# Patient Record
Sex: MALE | Race: WHITE | NOT HISPANIC OR LATINO | Employment: OTHER | URBAN - METROPOLITAN AREA
[De-identification: names, ages, dates, MRNs, and addresses within clinical notes are randomized per-mention and may not be internally consistent; named-entity substitution may affect disease eponyms.]

---

## 2023-12-23 ENCOUNTER — APPOINTMENT (OUTPATIENT)
Dept: GENERAL RADIOLOGY | Facility: HOSPITAL | Age: 83
End: 2023-12-23
Payer: MEDICARE

## 2023-12-23 ENCOUNTER — HOSPITAL ENCOUNTER (EMERGENCY)
Facility: HOSPITAL | Age: 83
Discharge: HOME | End: 2023-12-23
Attending: EMERGENCY MEDICINE
Payer: MEDICARE

## 2023-12-23 ENCOUNTER — APPOINTMENT (OUTPATIENT)
Dept: CT IMAGING | Facility: HOSPITAL | Age: 83
End: 2023-12-23
Payer: MEDICARE

## 2023-12-23 VITALS
TEMPERATURE: 97.6 F | BODY MASS INDEX: 28.78 KG/M2 | SYSTOLIC BLOOD PRESSURE: 157 MMHG | WEIGHT: 201.06 LBS | HEIGHT: 70 IN | DIASTOLIC BLOOD PRESSURE: 94 MMHG | RESPIRATION RATE: 15 BRPM | OXYGEN SATURATION: 96 % | HEART RATE: 105 BPM

## 2023-12-23 DIAGNOSIS — I49.8 VENTRICULAR BIGEMINY: ICD-10-CM

## 2023-12-23 DIAGNOSIS — S50.01XA CONTUSION OF RIGHT ELBOW, INITIAL ENCOUNTER: ICD-10-CM

## 2023-12-23 DIAGNOSIS — R55 SYNCOPE, UNSPECIFIED SYNCOPE TYPE: Primary | ICD-10-CM

## 2023-12-23 LAB
ALBUMIN SERPL-MCNC: 4.4 G/DL (ref 3.5–5.2)
ALBUMIN/GLOB SERPL: 1.7 G/DL
ALP SERPL-CCNC: 60 U/L (ref 39–117)
ALT SERPL W P-5'-P-CCNC: 8 U/L (ref 1–41)
ANION GAP SERPL CALCULATED.3IONS-SCNC: 12.3 MMOL/L (ref 5–15)
APAP SERPL-MCNC: <5 MCG/ML (ref 0–30)
AST SERPL-CCNC: 18 U/L (ref 1–40)
BASOPHILS # BLD AUTO: 0.06 10*3/MM3 (ref 0–0.2)
BASOPHILS NFR BLD AUTO: 0.8 % (ref 0–1.5)
BILIRUB SERPL-MCNC: 0.3 MG/DL (ref 0–1.2)
BUN SERPL-MCNC: 11 MG/DL (ref 8–23)
BUN/CREAT SERPL: 11.8 (ref 7–25)
CALCIUM SPEC-SCNC: 9.2 MG/DL (ref 8.6–10.5)
CHLORIDE SERPL-SCNC: 101 MMOL/L (ref 98–107)
CO2 SERPL-SCNC: 22.7 MMOL/L (ref 22–29)
CREAT SERPL-MCNC: 0.93 MG/DL (ref 0.76–1.27)
DEPRECATED RDW RBC AUTO: 55.2 FL (ref 37–54)
EGFRCR SERPLBLD CKD-EPI 2021: 81 ML/MIN/1.73
EOSINOPHIL # BLD AUTO: 0.17 10*3/MM3 (ref 0–0.4)
EOSINOPHIL NFR BLD AUTO: 2.3 % (ref 0.3–6.2)
ERYTHROCYTE [DISTWIDTH] IN BLOOD BY AUTOMATED COUNT: 16.4 % (ref 12.3–15.4)
ETHANOL BLD-MCNC: <10 MG/DL (ref 0–10)
ETHANOL UR QL: <0.01 %
GLOBULIN UR ELPH-MCNC: 2.6 GM/DL
GLUCOSE SERPL-MCNC: 95 MG/DL (ref 65–99)
HCT VFR BLD AUTO: 39.1 % (ref 37.5–51)
HGB BLD-MCNC: 12.9 G/DL (ref 13–17.7)
HOLD SPECIMEN: NORMAL
HOLD SPECIMEN: NORMAL
IMM GRANULOCYTES # BLD AUTO: 0.03 10*3/MM3 (ref 0–0.05)
IMM GRANULOCYTES NFR BLD AUTO: 0.4 % (ref 0–0.5)
LYMPHOCYTES # BLD AUTO: 2.43 10*3/MM3 (ref 0.7–3.1)
LYMPHOCYTES NFR BLD AUTO: 32.7 % (ref 19.6–45.3)
MAGNESIUM SERPL-MCNC: 2 MG/DL (ref 1.6–2.4)
MCH RBC QN AUTO: 30.4 PG (ref 26.6–33)
MCHC RBC AUTO-ENTMCNC: 33 G/DL (ref 31.5–35.7)
MCV RBC AUTO: 92 FL (ref 79–97)
MONOCYTES # BLD AUTO: 0.98 10*3/MM3 (ref 0.1–0.9)
MONOCYTES NFR BLD AUTO: 13.2 % (ref 5–12)
NEUTROPHILS NFR BLD AUTO: 3.76 10*3/MM3 (ref 1.7–7)
NEUTROPHILS NFR BLD AUTO: 50.6 % (ref 42.7–76)
NRBC BLD AUTO-RTO: 0 /100 WBC (ref 0–0.2)
PLATELET # BLD AUTO: 308 10*3/MM3 (ref 140–450)
PMV BLD AUTO: 10.2 FL (ref 6–12)
POTASSIUM SERPL-SCNC: 4.4 MMOL/L (ref 3.5–5.2)
PROT SERPL-MCNC: 7 G/DL (ref 6–8.5)
QT INTERVAL: 402 MS
QTC INTERVAL: 510 MS
RBC # BLD AUTO: 4.25 10*6/MM3 (ref 4.14–5.8)
SALICYLATES SERPL-MCNC: 0.4 MG/DL
SODIUM SERPL-SCNC: 136 MMOL/L (ref 136–145)
TROPONIN T SERPL HS-MCNC: 22 NG/L
TROPONIN T SERPL HS-MCNC: 23 NG/L
WBC NRBC COR # BLD AUTO: 7.43 10*3/MM3 (ref 3.4–10.8)
WHOLE BLOOD HOLD COAG: NORMAL
WHOLE BLOOD HOLD SPECIMEN: NORMAL

## 2023-12-23 PROCEDURE — 93005 ELECTROCARDIOGRAM TRACING: CPT | Performed by: EMERGENCY MEDICINE

## 2023-12-23 PROCEDURE — 84484 ASSAY OF TROPONIN QUANT: CPT | Performed by: EMERGENCY MEDICINE

## 2023-12-23 PROCEDURE — 36415 COLL VENOUS BLD VENIPUNCTURE: CPT

## 2023-12-23 PROCEDURE — 93005 ELECTROCARDIOGRAM TRACING: CPT

## 2023-12-23 PROCEDURE — 72125 CT NECK SPINE W/O DYE: CPT

## 2023-12-23 PROCEDURE — 73080 X-RAY EXAM OF ELBOW: CPT

## 2023-12-23 PROCEDURE — 80143 DRUG ASSAY ACETAMINOPHEN: CPT

## 2023-12-23 PROCEDURE — 73030 X-RAY EXAM OF SHOULDER: CPT

## 2023-12-23 PROCEDURE — 80053 COMPREHEN METABOLIC PANEL: CPT

## 2023-12-23 PROCEDURE — 70450 CT HEAD/BRAIN W/O DYE: CPT

## 2023-12-23 PROCEDURE — 99284 EMERGENCY DEPT VISIT MOD MDM: CPT

## 2023-12-23 PROCEDURE — 84484 ASSAY OF TROPONIN QUANT: CPT

## 2023-12-23 PROCEDURE — 82077 ASSAY SPEC XCP UR&BREATH IA: CPT

## 2023-12-23 PROCEDURE — 85025 COMPLETE CBC W/AUTO DIFF WBC: CPT

## 2023-12-23 PROCEDURE — 83735 ASSAY OF MAGNESIUM: CPT

## 2023-12-23 PROCEDURE — 80179 DRUG ASSAY SALICYLATE: CPT

## 2023-12-23 RX ORDER — ATORVASTATIN CALCIUM 20 MG/1
1 TABLET, FILM COATED ORAL DAILY
COMMUNITY
Start: 2023-12-06

## 2023-12-23 RX ORDER — DONEPEZIL HYDROCHLORIDE 5 MG/1
1 TABLET, FILM COATED ORAL DAILY
COMMUNITY
Start: 2023-12-06

## 2023-12-23 RX ORDER — SODIUM CHLORIDE 0.9 % (FLUSH) 0.9 %
10 SYRINGE (ML) INJECTION AS NEEDED
Status: DISCONTINUED | OUTPATIENT
Start: 2023-12-23 | End: 2023-12-23 | Stop reason: HOSPADM

## 2023-12-23 NOTE — DISCHARGE INSTRUCTIONS
Monitor blood pressure at home.  Keep a daily recording and bring that with you to your primary care follow-up.  Continue your current home medications as prescribed.  Drink plenty of fluids.  I placed a referral to cardiology for outpatient follow-up of todays episodes.  You should receive a phone call within the next week with a follow-up appointment scheduled.  Return to the ER for any further episodes of passing out, chest pain, shortness of breath, or any other concerns issues that may arise.

## 2023-12-23 NOTE — ED PROVIDER NOTES
Time: 7:16 AM EST  Date of encounter:  12/23/2023  Independent Historian/Clinical History and Information was obtained by:   Patient and daughter  Chief Complaint: Syncopal episode    History is limited by: Dementia    History of Present Illness:  Patient is a 83 y.o. year old male who presents to the emergency department for evaluation of a syncopal episode.  Patient reports that he got up this morning around 345 a.m. per his normal.  Patient is he wakes up to check on and take care of his wife.  Pay states that he was walking around the edge of her bed while once his vision went black and the passed out.  Patient states that he awoke prior to him hitting the door and the ground.  Patient states that he literally was out for a split-second.  Patient recalls falling against the door and in fact he states that he broke to hinges.  Patient struck it with his right upper extremity and shoulder.  Patient complains of pain involving his right elbow and right shoulder but states he has range of motion.  Patient states he had a hard time getting up and the staff at the assisted living facility recommend him coming to the ER to be evaluated.  Patient denies a headache.  Patient denies any chest pain.  Patient denies any shortness of breath.    HPI    Patient Care Team  Primary Care Provider: Provider, No Known    Past Medical History:     No Known Allergies  Past Medical History:   Diagnosis Date    Dementia     early    Hyperlipidemia      History reviewed. No pertinent surgical history.  History reviewed. No pertinent family history.    Home Medications:  Prior to Admission medications    Medication Sig Start Date End Date Taking? Authorizing Provider   atorvastatin (LIPITOR) 20 MG tablet Take 1 tablet by mouth Daily. 12/6/23  Yes Cliff Bailey MD   donepezil (ARICEPT) 5 MG tablet Take 1 tablet by mouth Daily. 12/6/23  Yes Cliff Bailey MD        Social History:   Social History     Tobacco Use    Smoking  "status: Never    Smokeless tobacco: Never   Vaping Use    Vaping Use: Never used   Substance Use Topics    Alcohol use: Yes     Comment: patient and family states occasional rum drinker. place of living and EMS states patient drinks daily.    Drug use: Never         Review of Systems:  Review of Systems   Constitutional:  Negative for chills and fever.   HENT:  Negative for congestion, ear pain and sore throat.    Eyes:  Negative for pain.   Respiratory:  Negative for cough, chest tightness and shortness of breath.    Cardiovascular:  Negative for chest pain.   Gastrointestinal:  Negative for abdominal pain, diarrhea, nausea and vomiting.   Genitourinary:  Negative for flank pain and hematuria.   Musculoskeletal:  Positive for arthralgias (Right elbow and right shoulder). Negative for joint swelling.   Skin:  Negative for pallor.   Neurological:  Positive for syncope. Negative for seizures and headaches.   All other systems reviewed and are negative.       Physical Exam:  /80   Pulse 104   Temp 97.6 °F (36.4 °C) (Oral)   Resp 15   Ht 177.8 cm (70\")   Wt 91.2 kg (201 lb 1 oz)   SpO2 98%   BMI 28.85 kg/m²     Physical Exam    Vital signs were reviewed under triage note.  General appearance - Patient appears well-developed and well-nourished.  Patient is in no acute distress.  Head - Normocephalic, atraumatic.  Pupils - Equal, round, reactive to light.  Extraocular muscles are intact.  Conjunctiva is clear.  Nasal - Normal inspection.  No evidence of trauma or epistaxis.  Tympanic membranes - Gray, intact without erythema or retractions.  Oral mucosa - Pink and moist without lesions or erythema.  Uvula is midline.  Chest wall - Atraumatic.  Chest wall is nontender.  There are no vesicular rashes noted.  Neck - Supple.  Trachea was midline.  There is no palpable lymphadenopathy or thyromegaly.  There are no meningeal signs  Lungs - Clear to auscultation and percussion bilaterally.  Heart - Regular rate and " rhythm with frequent ectopy.    Abdomen - Soft.  Bowel sounds are present.  There is no palpable tenderness.  There is no rebound, guarding, or rigidity.  There are no palpable masses.  There are no pulsatile masses.  Back - Spine is straight and midline.  There is no CVA tenderness.  Extremities - Intact x4 with full range of motion.  There is an abrasion and contusion involving the right posterior elbow.  There is no palpable edema.  Pulses are intact x4 and equal.  Neurologic - Patient is awake, alert, and oriented x3.  Cranial nerves II through XII are grossly intact.  Motor and sensory functions grossly intact.  Cerebellar function was normal.  Integument - There are no rashes.  There are no petechia or purpura lesions noted.  There are no vesicular lesions noted.           Procedures:  Procedures      Medical Decision Making:      Comorbidities that affect care:    Dementia, hyperlipidemia    External Notes reviewed:    EMS Run sheet: EMS run sheet was reviewed by me.  Vital signs are stable.  No treatment was administered in route.      The following orders were placed and all results were independently analyzed by me:  Orders Placed This Encounter   Procedures    CT Head Without Contrast    CT Cervical Spine Without Contrast    XR Shoulder 2+ View Right    XR Elbow 3+ View Right    Ethanol    Acetaminophen Level    Salicylate Level    Urine Drug Screen - Urine, Clean Catch    [place] Draw    Comprehensive Metabolic Panel    Magnesium    Single High Sensitivity Troponin T    CBC Auto Differential    Single High Sensitivity Troponin T    Ambulatory Referral to Cardiology    NPO Diet NPO Type: Strict NPO    Undress & Gown    Continuous Pulse Oximetry    Vital Signs    Orthostatic Blood Pressure    Oxygen Therapy- Nasal Cannula; Titrate 1-6 LPM Per SpO2; 90 - 95%    POC Glucose Once    ECG 12 Lead ED Triage Standing Order; Syncope    Insert Peripheral IV    CBC & Differential    Green Top (Gel)    Lavender Top     Gold Top - SST    Light Blue Top       Medications Given in the Emergency Department:  Medications   sodium chloride 0.9 % flush 10 mL (has no administration in time range)        ED Course:    ED Course as of 12/23/23 0852   Sat Dec 23, 2023   0605 EKG performed at 446 was interpreted by me to show any normal sinus rhythm with a ventricular rate of 96 bpm.  Patient has frequent PVCs at times appears almost to be bigeminy.  The CT interval is 154 ms.  P waves are normal.  QRS interval was normal.  Axis was at 13 degrees.  There are some nonspecific ST-T wave change identified.  QT corrected is prolonged at 510 ms. [TB]      ED Course User Index  [TB] Blayne Stacy DO     The patient was seen and evaluated in the ED by me.  The above history and physical examination was performed as documented.  Diagnostic data was obtained.  Results reviewed.  Patient's ED workup was for the most part unremarkable.  Patient was noted to have frequent PVCs and even at time it appeared to be more of a bigeminy rhythm.  I am not convinced that this is the cause of the syncope however I will recommend outpatient cardiac follow-up for possible cardiac monitoring to make sure that there is no concerns the patient had an actual run of V. tach.  I did discuss this with the patient and the daughter.  Daughter is agreeable to outpatient follow-up.    Labs:    Lab Results (last 24 hours)       Procedure Component Value Units Date/Time    Ethanol [[id number]] Collected: 12/23/23 0454    Specimen: Blood Updated: 12/23/23 0532     Ethanol <10 mg/dL      Ethanol % <0.010 %     Narrative:      Ethanol (Plasma)  <10 Essentially Negative    Toxic Concentrations           mg/dL    Flushing, slowing of reflexes    Impaired visual activity         Depression of CNS              >100  Possible Coma                  >300       Acetaminophen Level [[id number]]  (Normal) Collected: 12/23/23 0454    Specimen: Blood Updated: 12/23/23 0532      Acetaminophen <5.0 mcg/mL     Salicylate Level [[id number]]  (Normal) Collected: 12/23/23 0454    Specimen: Blood Updated: 12/23/23 0532     Salicylate 0.4 mg/dL     CBC & Differential [[id number]]  (Abnormal) Collected: 12/23/23 0454    Specimen: Blood Updated: 12/23/23 0508    Narrative:      The following orders were created for panel order CBC & Differential.  Procedure                               Abnormality         Status                     ---------                               -----------         ------                     CBC Auto Differential[[id number]]        Abnormal            Final result                 Please view results for these tests on the individual orders.    Comprehensive Metabolic Panel [[id number]] Collected: 12/23/23 0454    Specimen: Blood Updated: 12/23/23 0532     Glucose 95 mg/dL      BUN 11 mg/dL      Creatinine 0.93 mg/dL      Sodium 136 mmol/L      Potassium 4.4 mmol/L      Chloride 101 mmol/L      CO2 22.7 mmol/L      Calcium 9.2 mg/dL      Total Protein 7.0 g/dL      Albumin 4.4 g/dL      ALT (SGPT) 8 U/L      AST (SGOT) 18 U/L      Alkaline Phosphatase 60 U/L      Total Bilirubin 0.3 mg/dL      Globulin 2.6 gm/dL      A/G Ratio 1.7 g/dL      BUN/Creatinine Ratio 11.8     Anion Gap 12.3 mmol/L      eGFR 81.0 mL/min/1.73     Narrative:      GFR Normal >60  Chronic Kidney Disease <60  Kidney Failure <15    The GFR formula is only valid for adults with stable renal function between ages 18 and 70.    Magnesium [[id number]]  (Normal) Collected: 12/23/23 0454    Specimen: Blood Updated: 12/23/23 0532     Magnesium 2.0 mg/dL     Single High Sensitivity Troponin T [[id number]]  (Abnormal) Collected: 12/23/23 0454    Specimen: Blood Updated: 12/23/23 0532     HS Troponin T 22 ng/L     Narrative:      High Sensitive Troponin T Reference Range:  <14.0 ng/L- Negative Female for AMI  <22.0 ng/L- Negative Male for AMI  >=14 - Abnormal Female indicating possible myocardial injury.  >=22 -  Abnormal Male indicating possible myocardial injury.   Clinicians would have to utilize clinical acumen, EKG, Troponin, and serial changes to determine if it is an Acute Myocardial Infarction or myocardial injury due to an underlying chronic condition.         CBC Auto Differential [[id number]]  (Abnormal) Collected: 12/23/23 0454    Specimen: Blood Updated: 12/23/23 0508     WBC 7.43 10*3/mm3      RBC 4.25 10*6/mm3      Hemoglobin 12.9 g/dL      Hematocrit 39.1 %      MCV 92.0 fL      MCH 30.4 pg      MCHC 33.0 g/dL      RDW 16.4 %      RDW-SD 55.2 fl      MPV 10.2 fL      Platelets 308 10*3/mm3      Neutrophil % 50.6 %      Lymphocyte % 32.7 %      Monocyte % 13.2 %      Eosinophil % 2.3 %      Basophil % 0.8 %      Immature Grans % 0.4 %      Neutrophils, Absolute 3.76 10*3/mm3      Lymphocytes, Absolute 2.43 10*3/mm3      Monocytes, Absolute 0.98 10*3/mm3      Eosinophils, Absolute 0.17 10*3/mm3      Basophils, Absolute 0.06 10*3/mm3      Immature Grans, Absolute 0.03 10*3/mm3      nRBC 0.0 /100 WBC     Single High Sensitivity Troponin T [[id number]]  (Abnormal) Collected: 12/23/23 0719    Specimen: Blood Updated: 12/23/23 0743     HS Troponin T 23 ng/L     Narrative:      High Sensitive Troponin T Reference Range:  <14.0 ng/L- Negative Female for AMI  <22.0 ng/L- Negative Male for AMI  >=14 - Abnormal Female indicating possible myocardial injury.  >=22 - Abnormal Male indicating possible myocardial injury.   Clinicians would have to utilize clinical acumen, EKG, Troponin, and serial changes to determine if it is an Acute Myocardial Infarction or myocardial injury due to an underlying chronic condition.                  Imaging:    CT Cervical Spine Without Contrast    Result Date: 12/23/2023  PROCEDURE: CT CERVICAL SPINE WO CONTRAST  COMPARISON: None.  INDICATIONS: Fall; possible head/neck trauma.  PROTOCOL:   Standard CT imaging protocol performed.    RADIATION:   Total DLP: 1,510.5 mGy*cm.   MA and/or KV  were/was adjusted to minimize radiation dose.    TECHNIQUE:  After obtaining the patient's consent, multi-planar CT images (422 CT images) were created without contrast material.  Despite best efforts, poor image quality limits interpretation of the study, especially due to patient motion artifact.     EXAM FINDINGS: A routine nonenhanced cervical spine CT was performed. Sagittal and coronal two-dimensional reformations are provided for review. No acute cervical spine fracture or acute malalignment is identified. Small nonspecific bilateral cervical lymph nodes are seen.  Moderate-to-severe degenerative changes are seen multiple levels.  Streak artifact from dental amalgam obscures detail.  Arterial calcifications are seen.  Please see the head CT exam report from the same day for further detail regarding additional findings, which may also be partially included in the field of view on this exam.  There may be mild fibrotic and emphysematous changes in the partially imaged lung apices.  No pneumothorax is seen in the partially imaged upper thorax.  If symptoms or clinical concerns persist, consider imaging follow-up.         No acute cervical spine fracture is seen.  No acute subluxation abnormality is suggested.  The study is somewhat motion-limited.    Please note that portions of this note were completed with a voice recognition program.  KYARA SHAIKH JR, MD       Electronically Signed and Approved By: KYARA SHAIKH JR, MD on 12/23/2023 at 6:02              CT Head Without Contrast    Result Date: 12/23/2023  PROCEDURE: CT HEAD WO CONTRAST  COMPARISON: 12/23/2023, 5:08.  INDICATIONS: Fall; possible head/neck trauma.  PROTOCOL:   Standard CT imaging protocol performed.    RADIATION:   Total DLP: 1,510.5 mGy*cm.   MA and/or KV were/was adjusted to minimize radiation dose.    TECHNIQUE: After obtaining the patient's consent, 131 CT images were obtained without non-ionic intravenous contrast material.   DISCUSSION:  A routine nonenhanced head CT was performed. No acute brain abnormality is identified. No acute intracranial hemorrhage. No acute infarction. No acute skull fracture. No midline shift or acute intracranial mass effect is seen.  Moderate-to-severe chronic small vessel ischemia/infarction is suspected. There are arterial calcifications. The extra-axial spaces and the ventricular system are prominent, suggesting global atrophy.  Mild age-indeterminate mucosal thickening involves the imaged paranasal sinuses.  No air-fluid interfaces are seen within the imaged paranasal sinuses.  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  There may be a few scattered opacities in the left mastoid air cell complex.  No acute fracture is seen in this region.  These findings are age-indeterminate but probably chronic in nature (as with chronic congestive and/or inflammatory change).  Degenerative changes involve the partially imaged cervical spine.  Grossly, no acute orbital findings are seen.        No acute brain abnormality is seen.  No acute intracranial hemorrhage.  No acute skull fracture.  No definite acute infarct.  Please see above comments for further detail.    Please note that portions of this note were completed with a voice recognition program.  KYARA SHAIKH JR, MD       Electronically Signed and Approved By: KYARA SHAIKH JR, MD on 12/23/2023 at 5:57             XR Elbow 3+ View Right    Result Date: 12/23/2023  PROCEDURE: XR ELBOW 3+ VW RIGHT  COMPARISON: None.  INDICATIONS: FALL; RIGHT ELBOW PAIN.  FINDINGS: Three (3) views were obtained.  No acute fracture or acute malalignment is identified.  No right elbow joint effusion is suggested.  Soft tissue swelling is seen posteriorly and is  nonspecific.  It may represent an acute contusion.  Right-sided olecranon bursitis may give a similar radiographic appearance.  No subcutaneous emphysema.  No retained radiopaque foreign body.  Mild degenerative and enthesopathic changes involve the right elbow.  If symptoms or clinical concerns persist, consider imaging follow-up.       No acute fracture or acute malalignment is identified.     Please note that portions of this note were completed with a voice recognition program.  KYARA SHAIKH JR, MD       Electronically Signed and Approved By: KYARA SHAIKH JR, MD on 12/23/2023 at 5:14              XR Shoulder 2+ View Right    Result Date: 12/23/2023  PROCEDURE: XR SHOULDER 2+ VW RIGHT  (FOUR VIEWS)  COMPARISON: None.  INDICATIONS: FALL. RIGHT SHOULDER PAIN.  FINDINGS: Four (4) views were obtained.  No acute fracture or acute malalignment is identified.  Severe degenerative changes involve the right shoulder.  Enthesopathic changes are also suspected.  There is generalized osteopenia.  Arterial calcifications are seen.  No right-sided pneumothorax is appreciated.  External artifacts obscure detail.  There are degenerative changes of the partially imaged spine.  If symptoms or clinical concerns persist, consider imaging follow-up.       No acute fracture or acute malalignment is identified.     Please note that portions of this note were completed with a voice recognition program.  KYARA SHAIKH JR, MD       Electronically Signed and Approved By: KYARA SHAIKH JR, MD on 12/23/2023 at 5:11                 Differential Diagnosis and Discussion:    Syncope: Differential diagnosis includes but is not limited to TIA, hyperventilation, aortic stenosis, pulmonary emboli, myocardial disease, bradycardia arrhythmia, heart block, tachyarrhythmia, vasovagal, orthostatic hypotension, ruptured AAA, aortic dissection, subarachnoid hemorrhage, seizure, hypoglycemia.    All labs were reviewed and interpreted by me.  All  X-rays impressions were independently interpreted by me.  EKG was interpreted by me.  CT scan radiology impression was interpreted by me.    MDM     Amount and/or Complexity of Data Reviewed  Clinical lab tests: reviewed  Tests in the radiology section of CPT®: reviewed  Tests in the medicine section of CPT®: reviewed             Patient Care Considerations:    SEPSIS was considered but is NOT present in the emergency department as SIRS criteria is not present.      Consultants/Shared Management Plan:    None    Social Determinants of Health:    Patient is a nursing home/assisted living resident and has reliable access to care.      Disposition and Care Coordination:    Discharged: I considered escalation of care by admitting this patient for observation, however the patient has improved and is suitable and  stable for discharge.    I have explained the patient´s condition, diagnoses and treatment plan based on the information available to me at this time. I have answered questions and addressed any concerns. The patient has a good  understanding of the patient´s diagnosis, condition, and treatment plan as can be expected at this point. The vital signs have been stable. The patient´s condition is stable and appropriate for discharge from the emergency department.      The patient will pursue further outpatient evaluation with the primary care physician or other designated or consulting physician as outlined in the discharge instructions. They are agreeable to this plan of care and follow-up instructions have been explained in detail. The patient has received these instructions in written format and have expressed an understanding of the discharge instructions. The patient is aware that any significant change in condition or worsening of symptoms should prompt an immediate return to this or the closest emergency department or call to 911.    Final diagnoses:   Syncope, unspecified syncope type   Ventricular bigeminy    Contusion of right elbow, initial encounter        ED Disposition       ED Disposition   Discharge    Condition   Stable    Comment   --               This medical record created using voice recognition software.             Blayne Stacy DO  12/23/23 1937

## 2023-12-23 NOTE — ED NOTES
Patient to Ed from [facility]. Patient is not the memory care patient his wife is. Patient has hx of alcoholism. Patient fell and hit his right arm.

## 2023-12-28 ENCOUNTER — TRANSCRIBE ORDERS (OUTPATIENT)
Dept: ADMINISTRATIVE | Facility: HOSPITAL | Age: 83
End: 2023-12-28
Payer: MEDICARE

## 2023-12-28 DIAGNOSIS — I49.8 NODAL RHYTHM DISORDER: Primary | ICD-10-CM

## 2023-12-28 DIAGNOSIS — R55 SYNCOPE AND COLLAPSE: ICD-10-CM

## 2023-12-31 LAB
QT INTERVAL: 402 MS
QTC INTERVAL: 510 MS

## 2024-01-31 ENCOUNTER — OFFICE VISIT (OUTPATIENT)
Dept: CARDIOLOGY | Facility: CLINIC | Age: 84
End: 2024-01-31
Payer: MEDICARE

## 2024-01-31 VITALS
BODY MASS INDEX: 28.27 KG/M2 | WEIGHT: 197 LBS | SYSTOLIC BLOOD PRESSURE: 146 MMHG | HEART RATE: 78 BPM | DIASTOLIC BLOOD PRESSURE: 98 MMHG

## 2024-01-31 DIAGNOSIS — R55 SYNCOPE AND COLLAPSE: Primary | ICD-10-CM

## 2024-01-31 DIAGNOSIS — I49.8 VENTRICULAR BIGEMINY: ICD-10-CM

## 2024-01-31 PROCEDURE — 99203 OFFICE O/P NEW LOW 30 MIN: CPT | Performed by: INTERNAL MEDICINE

## 2024-01-31 PROCEDURE — 1159F MED LIST DOCD IN RCRD: CPT | Performed by: INTERNAL MEDICINE

## 2024-01-31 PROCEDURE — 1160F RVW MEDS BY RX/DR IN RCRD: CPT | Performed by: INTERNAL MEDICINE

## 2024-01-31 NOTE — PROGRESS NOTES
Chief Complaint  Loss of Consciousness and Ventricular bigeminy    Subjective            Arnaldo Benoit presents to [facility] CARDIOLOGY  History of Present Illness    83-year-old white male.  He has no previous cardiac problems.  He went to the emergency room recently with a syncopal episode.  It was the middle of the night around 3:45 AM the patient got up because he provides assistance to his wife who has dementia.  After being up for a bit walking across the room he felt like he was going to blackout and then apparently fell into a door and had syncope but was only transiently unconscious and then woke up with no specific symptoms afterward.  He was evaluated in the emergency room.  Laboratory studies were unremarkable.  His EKG showed sinus rhythm with ventricular bigeminy.  He has had no further syncopal episodes since then or previously.  He is not on any rate slowing medications.  He has an echocardiogram scheduled in early March.  He otherwise has been in his usual state of health.  No new medications.    PMH  Past Medical History:   Diagnosis Date    Dementia     early    Hyperlipidemia          SURGICALHX  History reviewed. No pertinent surgical history.     SOC  Social History     Socioeconomic History    Marital status:    Tobacco Use    Smoking status: Never    Smokeless tobacco: Never   Vaping Use    Vaping Use: Never used   Substance and Sexual Activity    Alcohol use: Yes     Comment: patient and family states occasional rum drinker. place of living and EMS states patient drinks daily.    Drug use: Never    Sexual activity: Defer         FAMHX  History reviewed. No pertinent family history.       ALLERGY  No Known Allergies     MEDSCURRENT    Current Outpatient Medications:     atorvastatin (LIPITOR) 20 MG tablet, Take 1 tablet by mouth Daily., Disp: , Rfl:     donepezil (ARICEPT) 5 MG tablet, Take 1 tablet by mouth Daily., Disp: , Rfl:       Review of Systems    Constitutional: Negative.   HENT: Negative.     Eyes: Negative.    Cardiovascular:  Positive for syncope. Negative for chest pain, dyspnea on exertion and irregular heartbeat.   Respiratory: Negative.  Negative for shortness of breath.    Endocrine: Negative.    Hematologic/Lymphatic: Negative.    Skin: Negative.    Musculoskeletal: Negative.    Gastrointestinal: Negative.    Genitourinary: Negative.    Psychiatric/Behavioral:  Positive for memory loss.         Objective     /98   Pulse 78   Wt 89.4 kg (197 lb)   BMI 28.27 kg/m²       General Appearance:   well developed  well nourished  HENT:   oropharynx moist  lips not cyanotic  Neck:  thyroid not enlarged  supple  Respiratory:  no respiratory distress  normal breath sounds  no rales  Cardiovascular:  no jugular venous distention  regular rhythm, bigeminy  apical impulse normal  S1 normal, S2 normal  no S3, no S4   no murmur  no rub, no thrill  carotid pulses normal; no bruit  pedal pulses normal  lower extremity edema: none    Musculoskeletal:  no clubbing of fingers.   normocephalic, head atraumatic  Skin:   warm, dry  Psychiatric:  judgement and insight appropriate  normal mood and affect      Result Review :     The following data was reviewed by: Dane Larios MD on 01/31/2024:    CMP          12/23/2023    04:54   CMP   Glucose 95    BUN 11    Creatinine 0.93    EGFR 81.0    Sodium 136    Potassium 4.4    Chloride 101    Calcium 9.2    Total Protein 7.0    Albumin 4.4    Globulin 2.6    Total Bilirubin 0.3    Alkaline Phosphatase 60    AST (SGOT) 18    ALT (SGPT) 8    Albumin/Globulin Ratio 1.7    BUN/Creatinine Ratio 11.8    Anion Gap 12.3      CBC          12/23/2023    04:54   CBC   WBC 7.43    RBC 4.25    Hemoglobin 12.9    Hematocrit 39.1    MCV 92.0    MCH 30.4    MCHC 33.0    RDW 16.4    Platelets 308            Data reviewed : Emergency room records reviewed, EKG personally reviewed by me from January that showed sinus  rhythm, PVCs and ventricular bigeminy.      Procedures                Assessment and Plan        ASSESSMENT:  Encounter Diagnoses   Name Primary?    Syncope and collapse Yes    Ventricular bigeminy          PLAN:    1.  Syncope and collapse-by history and circumstance it sounds like orthostasis.  His EKG showed ventricular bigeminy which is of unclear significance in this case.  The patient has no other cardiovascular symptoms reported at this time and has had no other previous episodes of syncope.  Will review his echocardiogram.  At this time I recommended he continue to keep himself well-hydrated.  There is no indication currently to treat the PVCs.  If he has any further episodes of presyncope or syncope an implanted loop recorder would be reasonable for arrhythmia detection.  2.  We will discuss the diagnostic results when available  3.  He is agreeable to this plan          Patient was given instructions and counseling regarding his condition or for health maintenance advice. Please see specific information pulled into the AVS if appropriate.             PARVIZ Larios MD  1/31/2024    12:36 EST

## 2024-02-28 DIAGNOSIS — R55 SYNCOPE AND COLLAPSE: Primary | ICD-10-CM

## 2024-07-16 ENCOUNTER — HOSPITAL ENCOUNTER (EMERGENCY)
Facility: HOSPITAL | Age: 84
Discharge: HOME | End: 2024-07-16
Attending: EMERGENCY MEDICINE
Payer: MEDICARE

## 2024-07-16 VITALS
DIASTOLIC BLOOD PRESSURE: 84 MMHG | SYSTOLIC BLOOD PRESSURE: 159 MMHG | OXYGEN SATURATION: 100 % | HEIGHT: 70 IN | BODY MASS INDEX: 28 KG/M2 | HEART RATE: 98 BPM | TEMPERATURE: 98.4 F | WEIGHT: 195.55 LBS | RESPIRATION RATE: 22 BRPM

## 2024-07-16 DIAGNOSIS — L98.9 SKIN LESION OF SCALP: Primary | ICD-10-CM

## 2024-07-16 PROCEDURE — 99282 EMERGENCY DEPT VISIT SF MDM: CPT

## 2024-07-16 NOTE — DISCHARGE INSTRUCTIONS
I have sent an urgent referral for you to follow-up with dermatology.  I provided you their office number and location as well so please call their office as soon as possible and inform the you are seen in the emergency department with concern for skin cancer on your head to schedule a follow-up visit.

## 2024-07-16 NOTE — ED PROVIDER NOTES
Time: 4:21 PM EDT  Date of encounter:  7/16/2024  Independent Historian/Clinical History and Information was obtained by:   Patient and Family    History is limited by: N/A    Chief Complaint: Skin lesion      History of Present Illness:  Patient is a 84 y.o. year old male who presents to the emergency department for evaluation of skin lesion.  Patient states he developed areas on his head about 1 month ago however the areas on the frontal portion of his head have become painful.  He is denying any drainage from them and has not been evaluated for this.  Patient's daughter is in room and does state that she believes the patient has had skin cancer removed from his head before but does not sure as he has not seen dermatology in many years.  There are no other complaints.    HPI    Patient Care Team  Primary Care Provider: Celina Salgado APRN    Past Medical History:     No Known Allergies  Past Medical History:   Diagnosis Date    Dementia     early    Hyperlipidemia      History reviewed. No pertinent surgical history.  History reviewed. No pertinent family history.    Home Medications:  Prior to Admission medications    Medication Sig Start Date End Date Taking? Authorizing Provider   atorvastatin (LIPITOR) 20 MG tablet Take 1 tablet by mouth Daily. 12/6/23   Cliff Bailey MD   donepezil (ARICEPT) 5 MG tablet Take 1 tablet by mouth Daily. 12/6/23   ProviderCliff MD        Social History:   Social History     Tobacco Use    Smoking status: Never    Smokeless tobacco: Never   Vaping Use    Vaping status: Never Used   Substance Use Topics    Alcohol use: Yes     Comment: patient and family states occasional rum drinker. place of living and EMS states patient drinks daily.    Drug use: Never         Review of Systems:  Review of Systems   Constitutional:  Negative for fever.   Skin:  Positive for rash.   All other systems reviewed and are negative.       Physical Exam:  /84 (BP Location: Right  "arm, Patient Position: Sitting)   Pulse 98   Temp 98.4 °F (36.9 °C) (Oral)   Resp 22   Ht 177.8 cm (70\")   Wt 88.7 kg (195 lb 8.8 oz)   SpO2 100%   BMI 28.06 kg/m²     Physical Exam  Vitals and nursing note reviewed.   Constitutional:       Appearance: Normal appearance. He is normal weight.   HENT:      Head: Normocephalic and atraumatic.      Nose: Nose normal.   Eyes:      Conjunctiva/sclera: Conjunctivae normal.      Pupils: Pupils are equal, round, and reactive to light.   Cardiovascular:      Rate and Rhythm: Normal rate and regular rhythm.      Heart sounds: Normal heart sounds.   Pulmonary:      Effort: Pulmonary effort is normal.      Breath sounds: Normal breath sounds.   Abdominal:      General: Abdomen is flat. There is no distension.   Musculoskeletal:         General: Normal range of motion.      Cervical back: Normal range of motion and neck supple.   Skin:     General: Skin is warm and dry.   Neurological:      General: No focal deficit present.      Mental Status: He is alert and oriented to person, place, and time.   Psychiatric:         Mood and Affect: Mood normal.         Behavior: Behavior normal.         Thought Content: Thought content normal.         Judgment: Judgment normal.                          Procedures:  Procedures      Medical Decision Making:    Comorbidities that affect care:    Dementia, hyperlipidemia    External Notes reviewed:    Previous Clinic Note: Cardiology office visit from 1-      The following orders were placed and all results were independently analyzed by me:  Orders Placed This Encounter   Procedures    Ambulatory Referral to Dermatology       Medications Given in the Emergency Department:  Medications - No data to display     ED Course:         Labs:    Lab Results (last 24 hours)       ** No results found for the last 24 hours. **             Imaging:    No Radiology Exams Resulted Within Past 24 Hours      Differential Diagnosis and " Discussion:    Rash: Differential diagnosis includes but is not limited to sepsis, cellulitis, [place] Spotted Fever, meningitis, meningococcemia, Varicella, Strep infection, dermatitis, allergic reaction, Lyme disease, and toxic shock syndrome.      MDM  Number of Diagnoses or Management Options  Diagnosis management comments: Patient presented to the emergency department today for skin lesions on his head.  There are 5 lesions noted concerning for skin cancer, there is no sign of infection.  Will send urgent referral to dermatology.    Risk of Complications, Morbidity, and/or Mortality  Presenting problems: moderate  Diagnostic procedures: low  Management options: low    Patient Progress  Patient progress: stable       Patient Care Considerations:    ANTIBIOTICS: I considered prescribing antibiotics as an outpatient however no bacterial focus of infection was found.      Consultants/Shared Management Plan:    None    Social Determinants of Health:    Patient has presented with family members who are responsible, reliable and will ensure follow up care.      Disposition and Care Coordination:    Discharged: The patient is suitable and stable for discharge with no need for consideration of admission.    I have explained the patient´s condition, diagnoses and treatment plan based on the information available to me at this time. I have answered questions and addressed any concerns. The patient has a good  understanding of the patient´s diagnosis, condition, and treatment plan as can be expected at this point. The vital signs have been stable. The patient´s condition is stable and appropriate for discharge from the emergency department.      The patient will pursue further outpatient evaluation with the primary care physician or other designated or consulting physician as outlined in the discharge instructions. They are agreeable to this plan of care and follow-up instructions have been explained in detail. The  patient has received these instructions in written format and has expressed an understanding of the discharge instructions. The patient is aware that any significant change in condition or worsening of symptoms should prompt an immediate return to this or the closest emergency department or call to 911.  I have explained discharge medications and the need for follow up with the patient/caretakers. This was also printed in the discharge instructions. Patient was discharged with the following medications and follow up:      Medication List      No changes were made to your prescriptions during this visit.      [facility] [address] [address]  [phone]  Schedule an appointment as soon as possible for a visit          Final diagnoses:   Skin lesion of scalp        ED Disposition       ED Disposition   Discharge    Condition   Stable    Comment   --               This medical record created using voice recognition software.             Jose Reid PA-C  07/16/24 [id number]

## 2024-08-07 ENCOUNTER — APPOINTMENT (OUTPATIENT)
Dept: GENERAL RADIOLOGY | Facility: HOSPITAL | Age: 84
End: 2024-08-07
Payer: MEDICARE

## 2024-08-07 ENCOUNTER — APPOINTMENT (OUTPATIENT)
Dept: CT IMAGING | Facility: HOSPITAL | Age: 84
End: 2024-08-07
Payer: MEDICARE

## 2024-08-07 ENCOUNTER — HOSPITAL ENCOUNTER (EMERGENCY)
Facility: HOSPITAL | Age: 84
Discharge: HOME | End: 2024-08-07
Attending: EMERGENCY MEDICINE
Payer: MEDICARE

## 2024-08-07 VITALS
DIASTOLIC BLOOD PRESSURE: 75 MMHG | RESPIRATION RATE: 16 BRPM | HEART RATE: 86 BPM | WEIGHT: 202.6 LBS | SYSTOLIC BLOOD PRESSURE: 131 MMHG | BODY MASS INDEX: 29.01 KG/M2 | TEMPERATURE: 97.9 F | OXYGEN SATURATION: 100 % | HEIGHT: 70 IN

## 2024-08-07 DIAGNOSIS — R42 LIGHTHEADEDNESS: Primary | ICD-10-CM

## 2024-08-07 LAB
ALBUMIN SERPL-MCNC: 4.3 G/DL (ref 3.5–5.2)
ALBUMIN/GLOB SERPL: 1.4 G/DL
ALP SERPL-CCNC: 64 U/L (ref 39–117)
ALT SERPL W P-5'-P-CCNC: 8 U/L (ref 1–41)
ANION GAP SERPL CALCULATED.3IONS-SCNC: 9.5 MMOL/L (ref 5–15)
AST SERPL-CCNC: 18 U/L (ref 1–40)
BASOPHILS # BLD AUTO: 0.04 10*3/MM3 (ref 0–0.2)
BASOPHILS NFR BLD AUTO: 0.4 % (ref 0–1.5)
BILIRUB SERPL-MCNC: 0.6 MG/DL (ref 0–1.2)
BILIRUB UR QL STRIP: NEGATIVE
BUN SERPL-MCNC: 10 MG/DL (ref 8–23)
BUN/CREAT SERPL: 10.9 (ref 7–25)
CALCIUM SPEC-SCNC: 9.3 MG/DL (ref 8.6–10.5)
CHLORIDE SERPL-SCNC: 95 MMOL/L (ref 98–107)
CLARITY UR: CLEAR
CO2 SERPL-SCNC: 25.5 MMOL/L (ref 22–29)
COLOR UR: YELLOW
CREAT SERPL-MCNC: 0.92 MG/DL (ref 0.76–1.27)
DEPRECATED RDW RBC AUTO: 54.3 FL (ref 37–54)
EGFRCR SERPLBLD CKD-EPI 2021: 81.5 ML/MIN/1.73
EOSINOPHIL # BLD AUTO: 0.06 10*3/MM3 (ref 0–0.4)
EOSINOPHIL NFR BLD AUTO: 0.6 % (ref 0.3–6.2)
ERYTHROCYTE [DISTWIDTH] IN BLOOD BY AUTOMATED COUNT: 16.6 % (ref 12.3–15.4)
FLUAV SUBTYP SPEC NAA+PROBE: NOT DETECTED
FLUBV RNA ISLT QL NAA+PROBE: NOT DETECTED
GLOBULIN UR ELPH-MCNC: 3.1 GM/DL
GLUCOSE SERPL-MCNC: 100 MG/DL (ref 65–99)
GLUCOSE UR STRIP-MCNC: NEGATIVE MG/DL
HCT VFR BLD AUTO: 39.1 % (ref 37.5–51)
HGB BLD-MCNC: 12.6 G/DL (ref 13–17.7)
HGB UR QL STRIP.AUTO: NEGATIVE
HOLD SPECIMEN: NORMAL
HOLD SPECIMEN: NORMAL
IMM GRANULOCYTES # BLD AUTO: 0.04 10*3/MM3 (ref 0–0.05)
IMM GRANULOCYTES NFR BLD AUTO: 0.4 % (ref 0–0.5)
KETONES UR QL STRIP: NEGATIVE
LEUKOCYTE ESTERASE UR QL STRIP.AUTO: NEGATIVE
LYMPHOCYTES # BLD AUTO: 1.53 10*3/MM3 (ref 0.7–3.1)
LYMPHOCYTES NFR BLD AUTO: 16.2 % (ref 19.6–45.3)
MAGNESIUM SERPL-MCNC: 2 MG/DL (ref 1.6–2.4)
MCH RBC QN AUTO: 29 PG (ref 26.6–33)
MCHC RBC AUTO-ENTMCNC: 32.2 G/DL (ref 31.5–35.7)
MCV RBC AUTO: 89.9 FL (ref 79–97)
MONOCYTES # BLD AUTO: 1.12 10*3/MM3 (ref 0.1–0.9)
MONOCYTES NFR BLD AUTO: 11.9 % (ref 5–12)
NEUTROPHILS NFR BLD AUTO: 6.63 10*3/MM3 (ref 1.7–7)
NEUTROPHILS NFR BLD AUTO: 70.5 % (ref 42.7–76)
NITRITE UR QL STRIP: NEGATIVE
NRBC BLD AUTO-RTO: 0 /100 WBC (ref 0–0.2)
PH UR STRIP.AUTO: 7 [PH] (ref 5–8)
PLATELET # BLD AUTO: 266 10*3/MM3 (ref 140–450)
PMV BLD AUTO: 9.4 FL (ref 6–12)
POTASSIUM SERPL-SCNC: 5 MMOL/L (ref 3.5–5.2)
PROT SERPL-MCNC: 7.4 G/DL (ref 6–8.5)
PROT UR QL STRIP: NEGATIVE
QT INTERVAL: 420 MS
QTC INTERVAL: 490 MS
RBC # BLD AUTO: 4.35 10*6/MM3 (ref 4.14–5.8)
RSV RNA NPH QL NAA+NON-PROBE: NOT DETECTED
SARS-COV-2 RNA RESP QL NAA+PROBE: NOT DETECTED
SODIUM SERPL-SCNC: 130 MMOL/L (ref 136–145)
SP GR UR STRIP: 1.01 (ref 1–1.03)
TROPONIN T SERPL HS-MCNC: 28 NG/L
UROBILINOGEN UR QL STRIP: NORMAL
WBC NRBC COR # BLD AUTO: 9.42 10*3/MM3 (ref 3.4–10.8)
WHOLE BLOOD HOLD COAG: NORMAL
WHOLE BLOOD HOLD SPECIMEN: NORMAL

## 2024-08-07 PROCEDURE — 99284 EMERGENCY DEPT VISIT MOD MDM: CPT

## 2024-08-07 PROCEDURE — 70450 CT HEAD/BRAIN W/O DYE: CPT

## 2024-08-07 PROCEDURE — 83735 ASSAY OF MAGNESIUM: CPT

## 2024-08-07 PROCEDURE — 93005 ELECTROCARDIOGRAM TRACING: CPT

## 2024-08-07 PROCEDURE — 87637 SARSCOV2&INF A&B&RSV AMP PRB: CPT | Performed by: EMERGENCY MEDICINE

## 2024-08-07 PROCEDURE — 81003 URINALYSIS AUTO W/O SCOPE: CPT | Performed by: EMERGENCY MEDICINE

## 2024-08-07 PROCEDURE — 71045 X-RAY EXAM CHEST 1 VIEW: CPT

## 2024-08-07 PROCEDURE — 93005 ELECTROCARDIOGRAM TRACING: CPT | Performed by: EMERGENCY MEDICINE

## 2024-08-07 PROCEDURE — 93010 ELECTROCARDIOGRAM REPORT: CPT | Performed by: SPECIALIST

## 2024-08-07 PROCEDURE — 84484 ASSAY OF TROPONIN QUANT: CPT

## 2024-08-07 PROCEDURE — 85025 COMPLETE CBC W/AUTO DIFF WBC: CPT

## 2024-08-07 PROCEDURE — 80053 COMPREHEN METABOLIC PANEL: CPT

## 2024-08-07 RX ORDER — SODIUM CHLORIDE 0.9 % (FLUSH) 0.9 %
10 SYRINGE (ML) INJECTION AS NEEDED
Status: DISCONTINUED | OUTPATIENT
Start: 2024-08-07 | End: 2024-08-07 | Stop reason: HOSPADM

## 2024-08-07 NOTE — ED PROVIDER NOTES
Time: 1:06 PM EDT  Date of encounter:  8/7/2024  Independent Historian/Clinical History and Information was obtained by:   Patient and Family    History is limited by: N/A    Chief Complaint: Lightheadedness      History of Present Illness:  Patient is a 84 y.o. year old male who presents to the emergency department for evaluation of an episode of lightheadedness today.  Patient reports he just did not feel right and had reports he feels better at this time.    HPI    Patient Care Team  Primary Care Provider: Celina Salgado APRN    Past Medical History:     No Known Allergies  Past Medical History:   Diagnosis Date    Dementia     early    Hyperlipidemia      History reviewed. No pertinent surgical history.  History reviewed. No pertinent family history.    Home Medications:  Prior to Admission medications    Medication Sig Start Date End Date Taking? Authorizing Provider   atorvastatin (LIPITOR) 20 MG tablet Take 1 tablet by mouth Daily. 12/6/23   Cliff Bailey MD   donepezil (ARICEPT) 5 MG tablet Take 1 tablet by mouth Daily. 12/6/23   ProviderCliff MD        Social History:   Social History     Tobacco Use    Smoking status: Never    Smokeless tobacco: Never   Vaping Use    Vaping status: Never Used   Substance Use Topics    Alcohol use: Yes     Comment: patient and family states occasional rum drinker. place of living and EMS states patient drinks daily.    Drug use: Never         Review of Systems:  Review of Systems   Constitutional:  Negative for chills and fever.   HENT:  Negative for congestion, rhinorrhea and sore throat.    Eyes:  Negative for pain and visual disturbance.   Respiratory:  Negative for apnea, cough, chest tightness and shortness of breath.    Cardiovascular:  Negative for chest pain and palpitations.   Gastrointestinal:  Negative for abdominal pain, diarrhea, nausea and vomiting.   Genitourinary:  Negative for difficulty urinating and dysuria.   Musculoskeletal:  Negative  "for joint swelling and myalgias.   Skin:  Negative for color change.   Neurological:  Negative for seizures and headaches.   Psychiatric/Behavioral: Negative.     All other systems reviewed and are negative.       Physical Exam:  /75   Pulse 86   Temp 97.9 °F (36.6 °C) (Oral)   Resp 16   Ht 177.8 cm (70\")   Wt 91.9 kg (202 lb 9.6 oz)   SpO2 100%   BMI 29.07 kg/m²     Physical Exam  Vitals and nursing note reviewed.   Constitutional:       General: He is not in acute distress.     Appearance: Normal appearance. He is not toxic-appearing.   HENT:      Head: Normocephalic and atraumatic.      Jaw: There is normal jaw occlusion.   Eyes:      General: Lids are normal.      Extraocular Movements: Extraocular movements intact.      Conjunctiva/sclera: Conjunctivae normal.      Pupils: Pupils are equal, round, and reactive to light.   Cardiovascular:      Rate and Rhythm: Normal rate and regular rhythm.      Pulses: Normal pulses.      Heart sounds: Normal heart sounds.   Pulmonary:      Effort: Pulmonary effort is normal. No respiratory distress.      Breath sounds: Normal breath sounds. No wheezing or rhonchi.   Abdominal:      General: Abdomen is flat.      Palpations: Abdomen is soft.      Tenderness: There is no abdominal tenderness. There is no guarding or rebound.   Musculoskeletal:         General: Normal range of motion.      Cervical back: Normal range of motion and neck supple.      Right lower leg: No edema.      Left lower leg: No edema.   Skin:     General: Skin is warm and dry.   Neurological:      Mental Status: He is alert and oriented to person, place, and time. Mental status is at baseline.   Psychiatric:         Mood and Affect: Mood normal.                  Procedures:  Procedures      Medical Decision Making:      Comorbidities that affect care:    Dementia, hyperlipidemia    External Notes reviewed:    None      The following orders were placed and all results were independently analyzed " by me:  Orders Placed This Encounter   Procedures    COVID PRE-OP / PRE-PROCEDURE SCREENING ORDER (NO ISOLATION) - Swab, Nasopharynx    COVID-19, FLU A/B, RSV PCR 1 HR TAT - Swab, Nasopharynx    XR Chest 1 View    CT Head Without Contrast    [place] Draw    Comprehensive Metabolic Panel    Single High Sensitivity Troponin T    Magnesium    Urinalysis With Microscopic If Indicated (No Culture) - Urine, Clean Catch    CBC Auto Differential    NPO Diet NPO Type: Strict NPO    Undress & Gown    Continuous Pulse Oximetry    Vital Signs    Orthostatic Blood Pressure    Oxygen Therapy- Nasal Cannula; Titrate 1-6 LPM Per SpO2; 90 - 95%    POC Glucose Once    ECG 12 Lead ED Triage Standing Order; Weak / Dizzy / AMS    Insert Peripheral IV    Fall Precautions    CBC & Differential    Green Top (Gel)    Lavender Top    Gold Top - SST    Light Blue Top       Medications Given in the Emergency Department:  Medications   sodium chloride 0.9 % flush 10 mL (has no administration in time range)        ED Course:         Labs:    Lab Results (last 24 hours)       Procedure Component Value Units Date/Time    CBC & Differential [[id number]]  (Abnormal) Collected: 08/07/24 1041    Specimen: Blood Updated: 08/07/24 1050    Narrative:      The following orders were created for panel order CBC & Differential.  Procedure                               Abnormality         Status                     ---------                               -----------         ------                     CBC Auto Differential[[id number]]        Abnormal            Final result                 Please view results for these tests on the individual orders.    Comprehensive Metabolic Panel [[id number]]  (Abnormal) Collected: 08/07/24 1041    Specimen: Blood Updated: 08/07/24 1111     Glucose 100 mg/dL      BUN 10 mg/dL      Creatinine 0.92 mg/dL      Sodium 130 mmol/L      Potassium 5.0 mmol/L      Chloride 95 mmol/L      CO2 25.5 mmol/L      Calcium 9.3 mg/dL       Total Protein 7.4 g/dL      Albumin 4.3 g/dL      ALT (SGPT) 8 U/L      AST (SGOT) 18 U/L      Alkaline Phosphatase 64 U/L      Total Bilirubin 0.6 mg/dL      Globulin 3.1 gm/dL      A/G Ratio 1.4 g/dL      BUN/Creatinine Ratio 10.9     Anion Gap 9.5 mmol/L      eGFR 81.5 mL/min/1.73     Narrative:      GFR Normal >60  Chronic Kidney Disease <60  Kidney Failure <15    The GFR formula is only valid for adults with stable renal function between ages 18 and 70.    Single High Sensitivity Troponin T [[id number]]  (Abnormal) Collected: 08/07/24 1041    Specimen: Blood Updated: 08/07/24 1111     HS Troponin T 28 ng/L     Narrative:      High Sensitive Troponin T Reference Range:  <14.0 ng/L- Negative Female for AMI  <22.0 ng/L- Negative Male for AMI  >=14 - Abnormal Female indicating possible myocardial injury.  >=22 - Abnormal Male indicating possible myocardial injury.   Clinicians would have to utilize clinical acumen, EKG, Troponin, and serial changes to determine if it is an Acute Myocardial Infarction or myocardial injury due to an underlying chronic condition.         Magnesium [[id number]]  (Normal) Collected: 08/07/24 1041    Specimen: Blood Updated: 08/07/24 1111     Magnesium 2.0 mg/dL     CBC Auto Differential [[id number]]  (Abnormal) Collected: 08/07/24 1041    Specimen: Blood Updated: 08/07/24 1050     WBC 9.42 10*3/mm3      RBC 4.35 10*6/mm3      Hemoglobin 12.6 g/dL      Hematocrit 39.1 %      MCV 89.9 fL      MCH 29.0 pg      MCHC 32.2 g/dL      RDW 16.6 %      RDW-SD 54.3 fl      MPV 9.4 fL      Platelets 266 10*3/mm3      Neutrophil % 70.5 %      Lymphocyte % 16.2 %      Monocyte % 11.9 %      Eosinophil % 0.6 %      Basophil % 0.4 %      Immature Grans % 0.4 %      Neutrophils, Absolute 6.63 10*3/mm3      Lymphocytes, Absolute 1.53 10*3/mm3      Monocytes, Absolute 1.12 10*3/mm3      Eosinophils, Absolute 0.06 10*3/mm3      Basophils, Absolute 0.04 10*3/mm3      Immature Grans, Absolute 0.04 10*3/mm3       nRBC 0.0 /100 WBC     COVID PRE-OP / PRE-PROCEDURE SCREENING ORDER (NO ISOLATION) - Swab, Nasopharynx [[id number]]  (Normal) Collected: 08/07/24 1043    Specimen: Swab from Nasopharynx Updated: 08/07/24 1131    Narrative:      The following orders were created for panel order COVID PRE-OP / PRE-PROCEDURE SCREENING ORDER (NO ISOLATION) - Swab, Nasopharynx.  Procedure                               Abnormality         Status                     ---------                               -----------         ------                     COVID-19, FLU A/B, RSV P...[[id number]]  Normal              Final result                 Please view results for these tests on the individual orders.    COVID-19, FLU A/B, RSV PCR 1 HR TAT - Swab, Nasopharynx [[id number]]  (Normal) Collected: 08/07/24 1043    Specimen: Swab from Nasopharynx Updated: 08/07/24 1131     COVID19 Not Detected     Influenza A PCR Not Detected     Influenza B PCR Not Detected     RSV, PCR Not Detected    Narrative:      Fact sheet for providers: https://www.fda.gov/media/381235/download    Fact sheet for patients: https://www.fda.gov/media/413408/download    Test performed by PCR.    Urinalysis With Microscopic If Indicated (No Culture) - Urine, Clean Catch [[id number]]  (Normal) Collected: 08/07/24 1157    Specimen: Urine, Clean Catch Updated: 08/07/24 1209     Color, UA Yellow     Appearance, UA Clear     pH, UA 7.0     Specific Gravity, UA 1.010     Glucose, UA Negative     Ketones, UA Negative     Bilirubin, UA Negative     Blood, UA Negative     Protein, UA Negative     Leuk Esterase, UA Negative     Nitrite, UA Negative     Urobilinogen, UA 0.2 E.U./dL    Narrative:      Urine microscopic not indicated.             Imaging:    CT Head Without Contrast    Result Date: 8/7/2024  CT HEAD WO CONTRAST Date of Exam: 8/7/2024 12:26 PM EDT Indication: confusion. Comparison: CT head without contrast 12/23/2023 Technique: Axial CT images were obtained of the head without  contrast administration.  Reconstructed coronal and sagittal images were also obtained. Automated exposure control and iterative construction methods were used. Findings: No intracranial hemorrhage. No mass or midline shift. Diffuse volume loss. Symmetric enlargement sulci and ventricles. Moderate amount of low-density periventricular subcortical white matter. No focal hypodensities to suggest acute or subacute infarct. Globes and extraocular muscles are normal in appearance. Mastoid air cells are well aerated. 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 This is unchanged.     Impression: No acute intracranial abnormality on head CT. MRI is more sensitive to evaluate for acute or subacute infarct. Diffuse volume loss with moderate note of low-density in the periventricular and subcortical white matter. This is most commonly seen with chronic small vessel ischemic change. Electronically Signed: Gabriela Dykes MD  8/7/2024 12:43 PM EDT  Workstation ID: [id number]    XR Chest 1 View    Result Date: 8/7/2024  XR CHEST 1 VW Date of Exam: 8/7/2024 10:58 AM EDT Indication: Weak/Dizzy/AMS triage protocol Comparison: None available. Findings: Scattered lung parenchymal scarring and calcifications are seen bilaterally. Trace bilateral pleural effusions or pleural scarring noted. No pneumothorax is seen. No acute infiltrate is definitely identified. Cardiac silhouette is unremarkable. No acute osseous lesion is seen. There are senescent changes of the aorta and skeletal structures.     Impression: 1.Scattered bilateral lung parenchymal scarring and calcifications. Trace bilateral pleural effusions versus pleural scarring. No acute infiltrate is definitely identified. Electronically Signed: Familia Hastings MD  8/7/2024 11:22 AM EDT  Workstation ID: [id number]       Differential Diagnosis and Discussion:    Altered Mental Status: Based on the  patient's signs and symptoms, differential diagnosis includes but is not limited to meningitis, stroke, sepsis, subarachnoid hemorrhage, intracranial bleeding, encephalitis, and metabolic encephalopathy.    All labs were reviewed and interpreted by me.  CT scan radiology impression was interpreted by me.    MDM  Number of Diagnoses or Management Options  Lightheadedness  Diagnosis management comments: In summary this is an 84-year-old male patient with a history of dementia who presents to the emerged department for evaluation of lightheadedness and confusion earlier today.  He does report feeling much better at this time.  CBC independently reviewed and interpreted by me and shows no critical abnormalities.  CMP independently reviewed and interpreted by me and shows no critical abnormalities.  Urinalysis independently reviewed interpreted by me is unremarkable for acute pathology.  CT brain performed and is unremarkable for acute pathology with stable chronic findings only.  Very strict return to ER and follow-up instructions have been provided to the patient.                   Patient Care Considerations:    MRI: I considered ordering an MRI however no focal neurologic deficit, CT scan brain unremarkable      Consultants/Shared Management Plan:    None    Social Determinants of Health:    Patient is a nursing home/assisted living resident and has reliable access to care.      Disposition and Care Coordination:    Discharged: The patient is suitable and stable for discharge with no need for consideration of admission.    I have explained the patient´s condition, diagnoses and treatment plan based on the information available to me at this time. I have answered questions and addressed any concerns. The patient has a good  understanding of the patient´s diagnosis, condition, and treatment plan as can be expected at this point. The vital signs have been stable. The patient´s condition is stable and appropriate for  discharge from the emergency department.      The patient will pursue further outpatient evaluation with the primary care physician or other designated or consulting physician as outlined in the discharge instructions. They are agreeable to this plan of care and follow-up instructions have been explained in detail. The patient has received these instructions in written format and has expressed an understanding of the discharge instructions. The patient is aware that any significant change in condition or worsening of symptoms should prompt an immediate return to this or the closest emergency department or call to 911.  I have explained discharge medications and the need for follow up with the patient/caretakers. This was also printed in the discharge instructions. Patient was discharged with the following medications and follow up:      Medication List      No changes were made to your prescriptions during this visit.      Celina Salgado, APRN  [address] [address]  [phone]    In 1 week         Final diagnoses:   Lightheadedness        ED Disposition       ED Disposition   Discharge    Condition   Stable    Comment   --               This medical record created using voice recognition software.             Alexander Westbrook MD  08/07/24 0740

## 2024-08-21 ENCOUNTER — TELEPHONE (OUTPATIENT)
Dept: INTERNAL MEDICINE | Age: 84
End: 2024-08-21

## 2024-08-21 ENCOUNTER — OFFICE VISIT (OUTPATIENT)
Dept: INTERNAL MEDICINE | Age: 84
End: 2024-08-21
Payer: MEDICARE

## 2024-08-21 VITALS
RESPIRATION RATE: 16 BRPM | OXYGEN SATURATION: 100 % | BODY MASS INDEX: 30.33 KG/M2 | DIASTOLIC BLOOD PRESSURE: 85 MMHG | SYSTOLIC BLOOD PRESSURE: 120 MMHG | HEIGHT: 69 IN | HEART RATE: 89 BPM | TEMPERATURE: 97.8 F | WEIGHT: 204.8 LBS

## 2024-08-21 DIAGNOSIS — E78.5 HYPERLIPIDEMIA, UNSPECIFIED HYPERLIPIDEMIA TYPE: Chronic | ICD-10-CM

## 2024-08-21 DIAGNOSIS — R42 LIGHT-HEADEDNESS: Primary | ICD-10-CM

## 2024-08-21 DIAGNOSIS — R41.3 POOR SHORT TERM MEMORY: Chronic | ICD-10-CM

## 2024-08-21 DIAGNOSIS — E55.9 VITAMIN D DEFICIENCY: ICD-10-CM

## 2024-08-21 DIAGNOSIS — Z13.29 SCREENING FOR THYROID DISORDER: ICD-10-CM

## 2024-08-21 DIAGNOSIS — R73.01 IFG (IMPAIRED FASTING GLUCOSE): ICD-10-CM

## 2024-08-21 DIAGNOSIS — D64.9 ANEMIA, UNSPECIFIED TYPE: ICD-10-CM

## 2024-08-21 PROBLEM — C44.40 SKIN CANCER OF SCALP: Status: ACTIVE | Noted: 2024-08-21

## 2024-08-21 PROCEDURE — 1160F RVW MEDS BY RX/DR IN RCRD: CPT | Performed by: NURSE PRACTITIONER

## 2024-08-21 PROCEDURE — 1159F MED LIST DOCD IN RCRD: CPT | Performed by: NURSE PRACTITIONER

## 2024-08-21 PROCEDURE — 99204 OFFICE O/P NEW MOD 45 MIN: CPT | Performed by: NURSE PRACTITIONER

## 2024-08-21 RX ORDER — DONEPEZIL HYDROCHLORIDE 10 MG/1
10 TABLET, FILM COATED ORAL NIGHTLY
Qty: 90 TABLET | Refills: 1 | Status: SHIPPED | OUTPATIENT
Start: 2024-08-21

## 2024-08-21 NOTE — PROGRESS NOTES
"Chief Complaint  Establish Care (Short term memory loss)  Subjective      History of Present Illness  Arnaldo Benoit is a 84 y.o. male who presents with his daughter to [facility] INTERNAL MEDICINE:    1.  To establish primary care. He has been seen by the medical provider at [facility].     2.  Follow-up of ED visit for light-headiness on 8/7/24.CT brain performed and is unremarkable for acute pathology with stable chronic findings only.     3.  Patient has dementia and hyperlipidemia and takes medication for these.     Patient Care Team:  Celina Salgado APRN as PCP - General (Nurse Practitioner)  Robe Lay MD as Consulting Physician (Dermatology)  PARVIZ Larios MD as Consulting Physician (Cardiology)    Past Medical History:   Diagnosis Date    Dementia     early    Hyperlipidemia         History reviewed. No pertinent surgical history.     No Known Allergies       Current Outpatient Medications:     atorvastatin (LIPITOR) 20 MG tablet, Take 1 tablet by mouth Daily., Disp: , Rfl:     donepezil (Aricept) 10 MG tablet, Take 1 tablet by mouth Every Night., Disp: 90 tablet, Rfl: 1    Objective   /85 (BP Location: Left arm, Patient Position: Sitting, Cuff Size: Large Adult)   Pulse 89   Temp 97.8 °F (36.6 °C) (Temporal)   Resp 16   Ht 175.3 cm (69\")   Wt 92.9 kg (204 lb 12.8 oz)   SpO2 100%   BMI 30.24 kg/m²    Estimated body mass index is 30.24 kg/m² as calculated from the following:    Height as of this encounter: 175.3 cm (69\").    Weight as of this encounter: 92.9 kg (204 lb 12.8 oz).   Physical Exam  Vitals reviewed.   Constitutional:       General: He is not in acute distress.  HENT:      Head: Normocephalic and atraumatic.   Neck:      Comments: No thyroid enlargement  Cardiovascular:      Rate and Rhythm: Normal rate and regular rhythm.   Pulmonary:      Effort: Pulmonary effort is normal.      Breath sounds: Normal breath sounds. No wheezing, " rhonchi or rales.   Musculoskeletal:      Right lower leg: No edema.      Left lower leg: No edema.   Lymphadenopathy:      Cervical: No cervical adenopathy.   Skin:     General: Skin is warm and dry.      Comments: Bandage in place to scalp post skin lesion removal.    Neurological:      General: No focal deficit present.      Mental Status: He is alert.   Psychiatric:         Thought Content: Thought content normal.        Result Review :  The following data was reviewed by: RADHA Grant on 08/21/2024:  CMP          12/23/2023    04:54 8/7/2024    10:41   CMP   Glucose 95  100    BUN 11  10    Creatinine 0.93  0.92    EGFR 81.0  81.5    Sodium 136  130    Potassium 4.4  5.0    Chloride 101  95    Calcium 9.2  9.3    Total Protein 7.0  7.4    Albumin 4.4  4.3    Globulin 2.6  3.1    Total Bilirubin 0.3  0.6    Alkaline Phosphatase 60  64    AST (SGOT) 18  18    ALT (SGPT) 8  8    Albumin/Globulin Ratio 1.7  1.4    BUN/Creatinine Ratio 11.8  10.9    Anion Gap 12.3  9.5      CBC w/diff          12/23/2023    04:54 8/7/2024    10:41   CBC w/Diff   WBC 7.43  9.42    RBC 4.25  4.35    Hemoglobin 12.9  12.6    Hematocrit 39.1  39.1    MCV 92.0  89.9    MCH 30.4  29.0    MCHC 33.0  32.2    RDW 16.4  16.6    Platelets 308  266    Neutrophil Rel % 50.6  70.5    Immature Granulocyte Rel % 0.4  0.4    Lymphocyte Rel % 32.7  16.2    Monocyte Rel % 13.2  11.9    Eosinophil Rel % 2.3  0.6    Basophil Rel % 0.8  0.4      UA          8/7/2024    11:57   Urinalysis   Specific [place], UA 1.010    Ketones, UA Negative    Blood, UA Negative    Leukocytes, UA Negative    Nitrite, UA Negative      Data reviewed : Office Visit with PARVIZ Larios MD (01/31/2024)  ED with Alexander Westbrook MD (08/07/2024)            Assessment and Plan   Diagnoses and all orders for this visit:    1. Light-headedness (Primary)  Comments:  Resolved.    2. Hyperlipidemia, unspecified hyperlipidemia type  Comments:  Stable on atorvastatin 20 mg  daily.  Orders:  -     Comprehensive Metabolic Panel; Future  -     Lipid Panel; Future    3. Poor short term memory  Comments:  Increase to Aricept 10 mg qhs.    4. Vitamin D deficiency  -     Vitamin D,25-Hydroxy; Future    5. Anemia, unspecified type  -     CBC & Differential; Future  -     Iron; Future  -     Vitamin B12; Future  -     Folate; Future    6. Screening for thyroid disorder  -     TSH Rfx On Abnormal To Free T4; Future    7. IFG (impaired fasting glucose)  -     Hemoglobin A1c; Future    Other orders  -     donepezil (Aricept) 10 MG tablet; Take 1 tablet by mouth Every Night.  Dispense: 90 tablet; Refill: 1      Patient was given instructions and counseling regarding his condition. Please see specific information pulled into the AVS if appropriate.     Follow Up   Return in about 6 months (around 2/21/2025) for Medicare Wellness.    Dictated Utilizing Dragon Dictation.  Please note that portions of this note were completed with a voice recognition program.  Part of this note may be an electronic transcription/translation of spoken language to printed text using the Dragon Dictation System.    RADHA Grant

## 2024-08-21 NOTE — TELEPHONE ENCOUNTER
Provider: [facility]    Caller: MANAN HAYDEN    Relationship to Patient: [facility]    Pharmacy:     Phone Number: [phone]     Reason for Call: CALLER WOULD LIKE TO KNOW IF PCP CAN SIGN ORDERS NOW THAT PATIENT IS A PATIENT OF [facility].    HUB UNABLE TO WARM TRANSFER    When was the patient last seen: 8/21/24

## 2024-08-22 NOTE — TELEPHONE ENCOUNTER
Hub staff attempted to follow warm transfer process and was unsuccessful     Caller: MANAN HAYDEN WITH [facility]    Relationship to patient: Home Health    Best call back number:     [phone]       Patient is needing: MANAN STATES THAT SHE NEEDS TO KNOW IF THE PATIENT SHOULD BE PUT ON ANTIBIOTICS BECAUSE THE PATIENT KEEPS TOUCHING HIS SURGICAL WOUND AND [facility] HAS NOT BEEN CHANGING HIS DRESSING.     MANAN WOULD LIKE A CALLBACK TO DISCUSS GETTING ORDERS FOR THE PATIENT.

## 2024-08-23 NOTE — TELEPHONE ENCOUNTER
Van from home health stated that the patient keeps removing his bandage from his head and they are afraid of infection. They contacted dermatology and they stated that the patient could get the antibiotic from PCP and said theres nothing they could do. I informed van that we would not send in the antibiotic, but we will sign home health orders. She will play it by ear and let me know about the wound if it gets infected. Informed her if it gets infected to take the patient to urgent care or ER if needed.

## 2024-09-09 ENCOUNTER — PATIENT ROUNDING (BHMG ONLY) (OUTPATIENT)
Dept: INTERNAL MEDICINE | Age: 84
End: 2024-09-09
Payer: COMMERCIAL

## 2024-09-16 ENCOUNTER — TELEPHONE (OUTPATIENT)
Dept: INTERNAL MEDICINE | Age: 84
End: 2024-09-16
Payer: COMMERCIAL

## 2024-10-17 ENCOUNTER — APPOINTMENT (OUTPATIENT)
Dept: CT IMAGING | Facility: HOSPITAL | Age: 84
End: 2024-10-17
Payer: MEDICARE

## 2024-10-17 ENCOUNTER — APPOINTMENT (OUTPATIENT)
Dept: GENERAL RADIOLOGY | Facility: HOSPITAL | Age: 84
End: 2024-10-17
Payer: MEDICARE

## 2024-10-17 ENCOUNTER — HOSPITAL ENCOUNTER (EMERGENCY)
Facility: HOSPITAL | Age: 84
Discharge: NURSING, REHAB OR LONG-TERM CARE | End: 2024-10-17
Attending: EMERGENCY MEDICINE | Admitting: EMERGENCY MEDICINE
Payer: MEDICARE

## 2024-10-17 VITALS
HEIGHT: 69 IN | BODY MASS INDEX: 30.07 KG/M2 | SYSTOLIC BLOOD PRESSURE: 147 MMHG | WEIGHT: 203.04 LBS | OXYGEN SATURATION: 96 % | TEMPERATURE: 97.7 F | RESPIRATION RATE: 18 BRPM | HEART RATE: 79 BPM | DIASTOLIC BLOOD PRESSURE: 89 MMHG

## 2024-10-17 DIAGNOSIS — L03.113 RIGHT ARM CELLULITIS: ICD-10-CM

## 2024-10-17 DIAGNOSIS — S22.31XA CLOSED FRACTURE OF ONE RIB OF RIGHT SIDE, INITIAL ENCOUNTER: Primary | ICD-10-CM

## 2024-10-17 LAB
ALBUMIN SERPL-MCNC: 4.1 G/DL (ref 3.5–5.2)
ALBUMIN/GLOB SERPL: 1.4 G/DL
ALP SERPL-CCNC: 86 U/L (ref 39–117)
ALT SERPL W P-5'-P-CCNC: 9 U/L (ref 1–41)
ANION GAP SERPL CALCULATED.3IONS-SCNC: 10.5 MMOL/L (ref 5–15)
AST SERPL-CCNC: 18 U/L (ref 1–40)
BASOPHILS # BLD AUTO: 0.05 10*3/MM3 (ref 0–0.2)
BASOPHILS NFR BLD AUTO: 0.5 % (ref 0–1.5)
BILIRUB SERPL-MCNC: 1.1 MG/DL (ref 0–1.2)
BUN SERPL-MCNC: 9 MG/DL (ref 8–23)
BUN/CREAT SERPL: 13.2 (ref 7–25)
CALCIUM SPEC-SCNC: 9 MG/DL (ref 8.6–10.5)
CHLORIDE SERPL-SCNC: 94 MMOL/L (ref 98–107)
CO2 SERPL-SCNC: 24.5 MMOL/L (ref 22–29)
CREAT SERPL-MCNC: 0.68 MG/DL (ref 0.76–1.27)
DEPRECATED RDW RBC AUTO: 50.4 FL (ref 37–54)
EGFRCR SERPLBLD CKD-EPI 2021: 91.1 ML/MIN/1.73
EOSINOPHIL # BLD AUTO: 0.17 10*3/MM3 (ref 0–0.4)
EOSINOPHIL NFR BLD AUTO: 1.5 % (ref 0.3–6.2)
ERYTHROCYTE [DISTWIDTH] IN BLOOD BY AUTOMATED COUNT: 15.8 % (ref 12.3–15.4)
GLOBULIN UR ELPH-MCNC: 3 GM/DL
GLUCOSE SERPL-MCNC: 112 MG/DL (ref 65–99)
HCT VFR BLD AUTO: 39.1 % (ref 37.5–51)
HGB BLD-MCNC: 12.7 G/DL (ref 13–17.7)
HOLD SPECIMEN: NORMAL
HOLD SPECIMEN: NORMAL
IMM GRANULOCYTES # BLD AUTO: 0.04 10*3/MM3 (ref 0–0.05)
IMM GRANULOCYTES NFR BLD AUTO: 0.4 % (ref 0–0.5)
LIPASE SERPL-CCNC: 23 U/L (ref 13–60)
LYMPHOCYTES # BLD AUTO: 1.35 10*3/MM3 (ref 0.7–3.1)
LYMPHOCYTES NFR BLD AUTO: 12.2 % (ref 19.6–45.3)
MAGNESIUM SERPL-MCNC: 2 MG/DL (ref 1.6–2.4)
MCH RBC QN AUTO: 28.6 PG (ref 26.6–33)
MCHC RBC AUTO-ENTMCNC: 32.5 G/DL (ref 31.5–35.7)
MCV RBC AUTO: 88.1 FL (ref 79–97)
MONOCYTES # BLD AUTO: 1.09 10*3/MM3 (ref 0.1–0.9)
MONOCYTES NFR BLD AUTO: 9.9 % (ref 5–12)
NEUTROPHILS NFR BLD AUTO: 75.5 % (ref 42.7–76)
NEUTROPHILS NFR BLD AUTO: 8.34 10*3/MM3 (ref 1.7–7)
NRBC BLD AUTO-RTO: 0 /100 WBC (ref 0–0.2)
NT-PROBNP SERPL-MCNC: 1545 PG/ML (ref 0–1800)
PLATELET # BLD AUTO: 308 10*3/MM3 (ref 140–450)
PMV BLD AUTO: 10 FL (ref 6–12)
POTASSIUM SERPL-SCNC: 4.3 MMOL/L (ref 3.5–5.2)
PROT SERPL-MCNC: 7.1 G/DL (ref 6–8.5)
QT INTERVAL: 428 MS
QT INTERVAL: 464 MS
QTC INTERVAL: 502 MS
QTC INTERVAL: 511 MS
RBC # BLD AUTO: 4.44 10*6/MM3 (ref 4.14–5.8)
SODIUM SERPL-SCNC: 129 MMOL/L (ref 136–145)
TROPONIN T SERPL HS-MCNC: 21 NG/L
WBC NRBC COR # BLD AUTO: 11.04 10*3/MM3 (ref 3.4–10.8)
WHOLE BLOOD HOLD COAG: NORMAL
WHOLE BLOOD HOLD SPECIMEN: NORMAL

## 2024-10-17 PROCEDURE — 83690 ASSAY OF LIPASE: CPT | Performed by: EMERGENCY MEDICINE

## 2024-10-17 PROCEDURE — 93010 ELECTROCARDIOGRAM REPORT: CPT | Performed by: INTERNAL MEDICINE

## 2024-10-17 PROCEDURE — 83735 ASSAY OF MAGNESIUM: CPT | Performed by: EMERGENCY MEDICINE

## 2024-10-17 PROCEDURE — 99284 EMERGENCY DEPT VISIT MOD MDM: CPT

## 2024-10-17 PROCEDURE — 85025 COMPLETE CBC W/AUTO DIFF WBC: CPT | Performed by: EMERGENCY MEDICINE

## 2024-10-17 PROCEDURE — 80053 COMPREHEN METABOLIC PANEL: CPT | Performed by: EMERGENCY MEDICINE

## 2024-10-17 PROCEDURE — 84484 ASSAY OF TROPONIN QUANT: CPT | Performed by: EMERGENCY MEDICINE

## 2024-10-17 PROCEDURE — 71045 X-RAY EXAM CHEST 1 VIEW: CPT

## 2024-10-17 PROCEDURE — 93005 ELECTROCARDIOGRAM TRACING: CPT | Performed by: EMERGENCY MEDICINE

## 2024-10-17 PROCEDURE — 71250 CT THORAX DX C-: CPT

## 2024-10-17 PROCEDURE — 83880 ASSAY OF NATRIURETIC PEPTIDE: CPT | Performed by: EMERGENCY MEDICINE

## 2024-10-17 RX ORDER — SODIUM CHLORIDE 0.9 % (FLUSH) 0.9 %
10 SYRINGE (ML) INJECTION AS NEEDED
Status: DISCONTINUED | OUTPATIENT
Start: 2024-10-17 | End: 2024-10-17 | Stop reason: HOSPADM

## 2024-10-17 RX ORDER — MUPIROCIN 20 MG/G
1 OINTMENT TOPICAL ONCE
Status: COMPLETED | OUTPATIENT
Start: 2024-10-17 | End: 2024-10-17

## 2024-10-17 RX ORDER — ASPIRIN 81 MG/1
324 TABLET, CHEWABLE ORAL ONCE
Status: DISCONTINUED | OUTPATIENT
Start: 2024-10-17 | End: 2024-10-17

## 2024-10-17 RX ORDER — TRAMADOL HYDROCHLORIDE 50 MG/1
50 TABLET ORAL EVERY 12 HOURS PRN
Qty: 8 TABLET | Refills: 0 | Status: SHIPPED | OUTPATIENT
Start: 2024-10-17

## 2024-10-17 RX ORDER — TRAMADOL HYDROCHLORIDE 50 MG/1
50 TABLET ORAL ONCE
Status: COMPLETED | OUTPATIENT
Start: 2024-10-17 | End: 2024-10-17

## 2024-10-17 RX ORDER — CEPHALEXIN 500 MG/1
500 CAPSULE ORAL 3 TIMES DAILY
Qty: 21 CAPSULE | Refills: 0 | Status: SHIPPED | OUTPATIENT
Start: 2024-10-17 | End: 2024-10-17

## 2024-10-17 RX ORDER — CEPHALEXIN 500 MG/1
500 CAPSULE ORAL 3 TIMES DAILY
Qty: 21 CAPSULE | Refills: 0 | Status: SHIPPED | OUTPATIENT
Start: 2024-10-17 | End: 2024-10-24

## 2024-10-17 RX ADMIN — MUPIROCIN 1 APPLICATION: 20 OINTMENT TOPICAL at 12:27

## 2024-10-17 RX ADMIN — TRAMADOL HYDROCHLORIDE 50 MG: 50 TABLET ORAL at 14:11

## 2024-10-17 RX ADMIN — CEPHALEXIN 500 MG: 250 CAPSULE ORAL at 12:26

## 2024-10-17 NOTE — DISCHARGE INSTRUCTIONS
You have 1 and possibly multiple ribs that are fractured to the right chest.  None of them are displaced and there is no need for any intervention aside from pain control.  As we discussed, your back shows multiple areas of what are called vertebral compression fractures.  All of these look to be old, however wanted your upper back could be newer than the others.  Since you have no new back pain as a complaint today, there will be no intervention for this either.  Return to the emergency department for fever, cough, difficulty breathing.

## 2024-10-17 NOTE — ED PROVIDER NOTES
Time: 10:51 AM EDT  Date of encounter:  10/17/2024  Independent Historian/Clinical History and Information was obtained by:   Patient    History is limited by: N/A    Chief Complaint: Fall, right arm wound and right chest pain      History of Present Illness:  Patient is a 84 y.o. year old male who presents to the emergency department for evaluation of fall 4 days ago.  Patient was in a nursing facility and states he was trying to help someone get up when he himself fell to the floor, striking his right arm on an object on the way down.  He denies hitting his head.  No LOC.  No headache.  Patient denies neck pain.  His only complaint is persistent right chest pain that is worsening today.  This pain is worse to touch of his chest or if he breathes deep or clears his throat.  He denies any recent cough, fevers chills, body aches.  He states specifically does not feel like he is fighting a respiratory infection.  He was sent today by staff because the right arm wound from his fall seems to be getting some redness/infection.  Afebrile.      Patient Care Team  Primary Care Provider: Celina Salgado APRN    Past Medical History:     No Known Allergies  Past Medical History:   Diagnosis Date    Dementia     early    Hyperlipidemia      History reviewed. No pertinent surgical history.  History reviewed. No pertinent family history.    Home Medications:  Prior to Admission medications    Medication Sig Start Date End Date Taking? Authorizing Provider   atorvastatin (LIPITOR) 20 MG tablet Take 1 tablet by mouth Daily. 12/6/23   Provider, MD Cliff   donepezil (Aricept) 10 MG tablet Take 1 tablet by mouth Every Night. 8/21/24   Celina Salgado APRN        Social History:   Social History     Tobacco Use    Smoking status: Former     Types: Cigarettes     Start date: 1975    Smokeless tobacco: Never   Vaping Use    Vaping status: Never Used   Substance Use Topics    Alcohol use: Yes     Comment: patient and family  "states occasional rum drinker. place of living and EMS states patient drinks daily.    Drug use: Never         Review of Systems:  Review of Systems   Constitutional:  Negative for chills and fever.   HENT:  Negative for congestion, rhinorrhea and sore throat.    Eyes:  Negative for photophobia.   Respiratory:  Negative for apnea, cough, chest tightness and shortness of breath.    Cardiovascular:  Positive for chest pain. Negative for palpitations.   Gastrointestinal:  Negative for abdominal pain, diarrhea, nausea and vomiting.   Endocrine: Negative.    Genitourinary:  Negative for difficulty urinating and dysuria.   Musculoskeletal:  Negative for back pain, joint swelling and myalgias.   Skin:  Positive for wound. Negative for color change.   Allergic/Immunologic: Negative.    Neurological:  Negative for seizures and headaches.   Psychiatric/Behavioral: Negative.     All other systems reviewed and are negative.       Physical Exam:  /98 (BP Location: Left arm, Patient Position: Sitting)   Pulse 72   Temp 97.7 °F (36.5 °C) (Oral)   Resp 22   Ht 175.3 cm (69\")   Wt 92.1 kg (203 lb 0.7 oz)   SpO2 97%   BMI 29.98 kg/m²     Physical Exam  Vitals and nursing note reviewed.   Constitutional:       General: He is awake.      Appearance: Normal appearance.   HENT:      Head: Normocephalic and atraumatic.      Nose: Nose normal.      Mouth/Throat:      Mouth: Mucous membranes are moist.   Eyes:      Extraocular Movements: Extraocular movements intact.      Pupils: Pupils are equal, round, and reactive to light.   Cardiovascular:      Rate and Rhythm: Normal rate and regular rhythm.      Heart sounds: Normal heart sounds.   Pulmonary:      Effort: Pulmonary effort is normal. No respiratory distress.      Breath sounds: Normal breath sounds. No wheezing, rhonchi or rales.   Abdominal:      General: Bowel sounds are normal.      Palpations: Abdomen is soft.      Tenderness: There is no abdominal tenderness. There " is no guarding or rebound.      Comments: No rigidity   Musculoskeletal:         General: No tenderness. Normal range of motion.      Cervical back: Normal range of motion and neck supple.   Skin:     General: Skin is warm and dry.      Coloration: Skin is not jaundiced.      Comments: Right upper extremity anterior lateral mid arm moderate size avulsion injury/skin tear.  There is moderate ecchymoses surrounding it with some erythema as well.  No drainage.  No abscess.   Neurological:      General: No focal deficit present.      Mental Status: He is alert. Mental status is at baseline.   Psychiatric:         Mood and Affect: Mood normal.                  Procedures:  Procedures      Medical Decision Making:      Comorbidities that affect care:    Dementia, hyperlipidemia    External Notes reviewed:    Previous Clinic Note: Internal medicine office visit 8/21/2024.  Description: Lightheadedness, hyperlipidemia      The following orders were placed and all results were independently analyzed by me:  Orders Placed This Encounter   Procedures    XR Chest 1 View    CT Chest Without Contrast Diagnostic    [place] Draw    High Sensitivity Troponin T    Comprehensive Metabolic Panel    Lipase    BNP    Magnesium    CBC Auto Differential    NPO Diet NPO Type: Strict NPO    Undress & Gown    Continuous Pulse Oximetry    Please apply right arm nonadherent dressing after Bactroban application  Nursing Communication    Oxygen Therapy- Nasal Cannula; Titrate 1-6 LPM Per SpO2; 90 - 95%    ECG 12 Lead ED Triage Standing Order; Chest Pain    ECG 12 Lead ED Triage Standing Order; Chest Pain    Insert Peripheral IV    CBC & Differential    Green Top (Gel)    Lavender Top    Gold Top - SST    Light Blue Top       Medications Given in the Emergency Department:  Medications   sodium chloride 0.9 % flush 10 mL (has no administration in time range)   traMADol (ULTRAM) tablet 50 mg (has no administration in time range)   cephalexin  (KEFLEX) capsule 500 mg (500 mg Oral Given 10/17/24 1226)   mupirocin (BACTROBAN) 2 % ointment 1 Application (1 Application Topical Given 10/17/24 1227)        ED Course:    ED Course as of 10/17/24 1312   Thu Oct 17, 2024   1043 I have personally interpreted the EKG today and it shows no evidence of any acute ischemia or heart arrhythmia. [RP]   1308 Patient denies any new back pain when I am discussing with him and his family member at bedside the CT results. [RP]      ED Course User Index  [RP] Alejandro Arriaza MD       Labs:    Lab Results (last 24 hours)       Procedure Component Value Units Date/Time    High Sensitivity Troponin T [[id number]]  (Normal) Collected: 10/17/24 0954    Specimen: Blood from Arm, Left Updated: 10/17/24 1037     HS Troponin T 21 ng/L     Narrative:      High Sensitive Troponin T Reference Range:  <14.0 ng/L- Negative Female for AMI  <22.0 ng/L- Negative Male for AMI  >=14 - Abnormal Female indicating possible myocardial injury.  >=22 - Abnormal Male indicating possible myocardial injury.   Clinicians would have to utilize clinical acumen, EKG, Troponin, and serial changes to determine if it is an Acute Myocardial Infarction or myocardial injury due to an underlying chronic condition.         CBC & Differential [[id number]]  (Abnormal) Collected: 10/17/24 0954    Specimen: Blood from Arm, Left Updated: 10/17/24 1009    Narrative:      The following orders were created for panel order CBC & Differential.  Procedure                               Abnormality         Status                     ---------                               -----------         ------                     CBC Auto Differential[[id number]]        Abnormal            Final result                 Please view results for these tests on the individual orders.    Comprehensive Metabolic Panel [[id number]]  (Abnormal) Collected: 10/17/24 0954    Specimen: Blood from Arm, Left Updated: 10/17/24 1037     Glucose 112 mg/dL       BUN 9 mg/dL      Creatinine 0.68 mg/dL      Sodium 129 mmol/L      Potassium 4.3 mmol/L      Chloride 94 mmol/L      CO2 24.5 mmol/L      Calcium 9.0 mg/dL      Total Protein 7.1 g/dL      Albumin 4.1 g/dL      ALT (SGPT) 9 U/L      AST (SGOT) 18 U/L      Alkaline Phosphatase 86 U/L      Total Bilirubin 1.1 mg/dL      Globulin 3.0 gm/dL      A/G Ratio 1.4 g/dL      BUN/Creatinine Ratio 13.2     Anion Gap 10.5 mmol/L      eGFR 91.1 mL/min/1.73     Narrative:      GFR Normal >60  Chronic Kidney Disease <60  Kidney Failure <15    The GFR formula is only valid for adults with stable renal function between ages 18 and 70.    Lipase [[id number]]  (Normal) Collected: 10/17/24 0954    Specimen: Blood from Arm, Left Updated: 10/17/24 1037     Lipase 23 U/L     BNP [[id number]]  (Normal) Collected: 10/17/24 0954    Specimen: Blood from Arm, Left Updated: 10/17/24 1032     proBNP 1,545.0 pg/mL     Narrative:      This assay is used as an aid in the diagnosis of individuals suspected of having heart failure. It can be used as an aid in the diagnosis of acute decompensated heart failure (ADHF) in patients presenting with signs and symptoms of ADHF to the emergency department (ED). In addition, NT-proBNP of <300 pg/mL indicates ADHF is not likely.    Age Range Result Interpretation  NT-proBNP Concentration (pg/mL:      <50             Positive            >450                   Gray                 300-450                    Negative             <300    50-75           Positive            >900                  Gray                300-900                  Negative            <300      >75             Positive            >1800                  Gray                300-1800                  Negative            <300    Magnesium [[id number]]  (Normal) Collected: 10/17/24 0954    Specimen: Blood from Arm, Left Updated: 10/17/24 1037     Magnesium 2.0 mg/dL     CBC Auto Differential [[id number]]  (Abnormal) Collected: 10/17/24 0954     Specimen: Blood from Arm, Left Updated: 10/17/24 1009     WBC 11.04 10*3/mm3      RBC 4.44 10*6/mm3      Hemoglobin 12.7 g/dL      Hematocrit 39.1 %      MCV 88.1 fL      MCH 28.6 pg      MCHC 32.5 g/dL      RDW 15.8 %      RDW-SD 50.4 fl      MPV 10.0 fL      Platelets 308 10*3/mm3      Neutrophil % 75.5 %      Lymphocyte % 12.2 %      Monocyte % 9.9 %      Eosinophil % 1.5 %      Basophil % 0.5 %      Immature Grans % 0.4 %      Neutrophils, Absolute 8.34 10*3/mm3      Lymphocytes, Absolute 1.35 10*3/mm3      Monocytes, Absolute 1.09 10*3/mm3      Eosinophils, Absolute 0.17 10*3/mm3      Basophils, Absolute 0.05 10*3/mm3      Immature Grans, Absolute 0.04 10*3/mm3      nRBC 0.0 /100 WBC              Imaging:    CT Chest Without Contrast Diagnostic    Result Date: 10/17/2024  CT CHEST WO CONTRAST DIAGNOSTIC Date of Exam: 10/17/2024 12:06 PM EDT Indication: Right anterior rib pain status post fall 4 days ago. Comparison: None available. Technique: Axial CT images were obtained of the chest without contrast administration.  Reconstructed coronal and sagittal images were also obtained. Automated exposure control and iterative construction methods were used. Findings: Samia/mediastinum: No abnormal fluid collection. Coronary artery calcification. No pericardial effusion. Lungs/pleura: No pneumothorax. Bilateral calcified pleural plaques. Moderate right and small left pleural effusions with secondary atelectasis in the lower lobes. Mild subpleural reticular interstitial change Upper Abdomen: Unremarkable Bones/soft tissues: Acute nondisplaced fracture of the anterior right sixth rib, and possibly of the anterior right fifth and seventh ribs. Subacute appearing T3 wedge compression fracture. Likely old T11, T12, and L2 endplate compression fractures.     1. Uncomplicated nondisplaced right sixth and possibly fifth and seventh ribs 2. Subacute appearing T3 wedge compression fracture, and likely chronic T11, T12, and L2  endplate compressions 3. Right larger than left pleural effusions 4. Bilateral calcified pleural plaques, correlate with any history of asbestos exposure 5. Mild interstitial lung disease 6. Coronary artery atherosclerosis Electronically Signed: Andrews Durant  10/17/2024 12:31 PM EDT  Workstation ID: [id number]    XR Chest 1 View    Result Date: 10/17/2024  XR CHEST 1 VW Date of Exam: 10/17/2024 9:54 AM EDT Indication: Chest Pain Triage Protocol Comparison: 8/7/2024 Findings: There are scattered areas of parenchymal scarring and calcifications bilaterally. There is increasing opacification of the right lung base. There is chronic blunting of the right costophrenic angle. The heart and pulmonary vessels appear within normal limits.     Impression: Some increased opacification of the right lung base may relate to infiltrate and/or effusion. Otherwise chronic findings. Electronically Signed: Skylar Bains MD  10/17/2024 10:11 AM EDT  Workstation ID: [id number]       Differential Diagnosis and Discussion:    Trauma:  Differential diagnosis considered but not limited to were subarachnoid hemorrhage, intracranial bleeding, pneumothorax, cardiac contusion, lung contusion, intra-abdominal bleeding, and compartment syndrome of any extremity or other significant traumatic pathology    All labs were reviewed and interpreted by me.  All X-rays impressions were independently interpreted by me.  EKG was interpreted by me.  CT scan radiology impression was interpreted by me.    MDM                     Patient Care Considerations:    ANTIBIOTICS: I considered prescribing antibiotics as an outpatient however no bacterial focus of infection was found.      Consultants/Shared Management Plan:    None    Social Determinants of Health:    Patient is a nursing home/assisted living resident and has reliable access to care.      Disposition and Care Coordination:    Discharged: I considered escalation of care by admitting this patient to the  hospital, however patient is well-appearing and hemodynamically stable.    I have explained the patient´s condition, diagnoses and treatment plan based on the information available to me at this time. I have answered questions and addressed any concerns. The patient has a good  understanding of the patient´s diagnosis, condition, and treatment plan as can be expected at this point. The vital signs have been stable. The patient´s condition is stable and appropriate for discharge from the emergency department.      The patient will pursue further outpatient evaluation with the primary care physician or other designated or consulting physician as outlined in the discharge instructions. They are agreeable to this plan of care and follow-up instructions have been explained in detail. The patient has received these instructions in written format and has expressed an understanding of the discharge instructions. The patient is aware that any significant change in condition or worsening of symptoms should prompt an immediate return to this or the closest emergency department or call to 911.    Final diagnoses:   Closed fracture of one rib of right side, initial encounter   Right arm cellulitis        ED Disposition       ED Disposition   Discharge    Condition   Stable    Comment   --               This medical record created using voice recognition software.             Alejandro Arriaza MD  10/17/24 [id number]

## 2024-11-08 RX ORDER — ATORVASTATIN CALCIUM 20 MG/1
20 TABLET, FILM COATED ORAL DAILY
Qty: 90 TABLET | Refills: 3 | Status: SHIPPED | OUTPATIENT
Start: 2024-11-08

## 2024-11-08 RX ORDER — DONEPEZIL HYDROCHLORIDE 10 MG/1
10 TABLET, FILM COATED ORAL NIGHTLY
Qty: 90 TABLET | Refills: 1 | Status: SHIPPED | OUTPATIENT
Start: 2024-11-08

## 2024-11-08 NOTE — TELEPHONE ENCOUNTER
Caller: AJITH    Relationship: [facility]    Best call back number: [phone]   FAX: [phone]     Requested Prescriptions:   Requested Prescriptions     Pending Prescriptions Disp Refills    atorvastatin (LIPITOR) 20 MG tablet 90 tablet      Sig: Take 1 tablet by mouth Daily.    donepezil (Aricept) 10 MG tablet 90 tablet 1     Sig: Take 1 tablet by mouth Every Night.        Pharmacy where request should be sent: [facility] [place] [id number] [place] [phone] [facility]     Last office visit with prescribing clinician: 8/21/2024   Last telemedicine visit with prescribing clinician: Visit date not found   Next office visit with prescribing clinician: 2/21/2025     Does the patient have less than a 3 day supply:  [x] Yes  [] No    Would you like a call back once the refill request has been completed: [x] Yes [] No    If the office needs to give you a call back, can they leave a voicemail: [x] Yes [] No    Tanner Jiang Rep   11/08/24 13:11 EST

## 2024-11-15 ENCOUNTER — HOSPITAL ENCOUNTER (EMERGENCY)
Facility: HOSPITAL | Age: 84
Discharge: HOME | DRG: 184 | End: 2024-11-15
Attending: EMERGENCY MEDICINE
Payer: MEDICARE

## 2024-11-15 ENCOUNTER — APPOINTMENT (OUTPATIENT)
Dept: GENERAL RADIOLOGY | Facility: HOSPITAL | Age: 84
DRG: 184 | End: 2024-11-15
Payer: MEDICARE

## 2024-11-15 ENCOUNTER — APPOINTMENT (OUTPATIENT)
Dept: CT IMAGING | Facility: HOSPITAL | Age: 84
DRG: 184 | End: 2024-11-15
Payer: MEDICARE

## 2024-11-15 VITALS
HEART RATE: 71 BPM | RESPIRATION RATE: 18 BRPM | WEIGHT: 210.1 LBS | HEIGHT: 70 IN | BODY MASS INDEX: 30.08 KG/M2 | TEMPERATURE: 97.7 F | SYSTOLIC BLOOD PRESSURE: 121 MMHG | DIASTOLIC BLOOD PRESSURE: 108 MMHG | OXYGEN SATURATION: 95 %

## 2024-11-15 DIAGNOSIS — S22.42XA CLOSED FRACTURE OF MULTIPLE RIBS OF LEFT SIDE, INITIAL ENCOUNTER: Primary | ICD-10-CM
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
## 2024-11-15 DIAGNOSIS — E87.1 HYPONATREMIA: ICD-10-CM
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
## 2024-11-15 LAB
ALBUMIN SERPL-MCNC: 4.2 G/DL (ref 3.5–5.2)
ALBUMIN/GLOB SERPL: 1.4 G/DL
ALP SERPL-CCNC: 81 U/L (ref 39–117)
ALT SERPL W P-5'-P-CCNC: 11 U/L (ref 1–41)
ANION GAP SERPL CALCULATED.3IONS-SCNC: 8.8 MMOL/L (ref 5–15)
AST SERPL-CCNC: 20 U/L (ref 1–40)
BASOPHILS # BLD AUTO: 0.05 10*3/MM3 (ref 0–0.2)
BASOPHILS NFR BLD AUTO: 0.6 % (ref 0–1.5)
BILIRUB SERPL-MCNC: 0.5 MG/DL (ref 0–1.2)
BUN SERPL-MCNC: 13 MG/DL (ref 8–23)
BUN/CREAT SERPL: 15.1 (ref 7–25)
CALCIUM SPEC-SCNC: 9 MG/DL (ref 8.6–10.5)
CHLORIDE SERPL-SCNC: 95 MMOL/L (ref 98–107)
CO2 SERPL-SCNC: 24.2 MMOL/L (ref 22–29)
CREAT SERPL-MCNC: 0.86 MG/DL (ref 0.76–1.27)
DEPRECATED RDW RBC AUTO: 51.6 FL (ref 37–54)
EGFRCR SERPLBLD CKD-EPI 2021: 84.9 ML/MIN/1.73
EOSINOPHIL # BLD AUTO: 0.12 10*3/MM3 (ref 0–0.4)
EOSINOPHIL NFR BLD AUTO: 1.3 % (ref 0.3–6.2)
ERYTHROCYTE [DISTWIDTH] IN BLOOD BY AUTOMATED COUNT: 16 % (ref 12.3–15.4)
GLOBULIN UR ELPH-MCNC: 3 GM/DL
GLUCOSE SERPL-MCNC: 99 MG/DL (ref 65–99)
HCT VFR BLD AUTO: 37.2 % (ref 37.5–51)
HGB BLD-MCNC: 12.2 G/DL (ref 13–17.7)
IMM GRANULOCYTES # BLD AUTO: 0.05 10*3/MM3 (ref 0–0.05)
IMM GRANULOCYTES NFR BLD AUTO: 0.6 % (ref 0–0.5)
LYMPHOCYTES # BLD AUTO: 1.22 10*3/MM3 (ref 0.7–3.1)
LYMPHOCYTES NFR BLD AUTO: 13.6 % (ref 19.6–45.3)
MCH RBC QN AUTO: 28.8 PG (ref 26.6–33)
MCHC RBC AUTO-ENTMCNC: 32.8 G/DL (ref 31.5–35.7)
MCV RBC AUTO: 87.9 FL (ref 79–97)
MONOCYTES # BLD AUTO: 0.97 10*3/MM3 (ref 0.1–0.9)
MONOCYTES NFR BLD AUTO: 10.8 % (ref 5–12)
NEUTROPHILS NFR BLD AUTO: 6.58 10*3/MM3 (ref 1.7–7)
NEUTROPHILS NFR BLD AUTO: 73.1 % (ref 42.7–76)
NRBC BLD AUTO-RTO: 0 /100 WBC (ref 0–0.2)
PLATELET # BLD AUTO: 279 10*3/MM3 (ref 140–450)
PMV BLD AUTO: 9.5 FL (ref 6–12)
POTASSIUM SERPL-SCNC: 4.4 MMOL/L (ref 3.5–5.2)
PROT SERPL-MCNC: 7.2 G/DL (ref 6–8.5)
RBC # BLD AUTO: 4.23 10*6/MM3 (ref 4.14–5.8)
SODIUM SERPL-SCNC: 128 MMOL/L (ref 136–145)
WBC NRBC COR # BLD AUTO: 8.99 10*3/MM3 (ref 3.4–10.8)

## 2024-11-15 PROCEDURE — 85025 COMPLETE CBC W/AUTO DIFF WBC: CPT

## 2024-11-15 PROCEDURE — 80053 COMPREHEN METABOLIC PANEL: CPT

## 2024-11-15 PROCEDURE — 25810000003 SODIUM CHLORIDE 0.9 % SOLUTION

## 2024-11-15 PROCEDURE — 25010000002 HYDROMORPHONE 1 MG/ML SOLUTION: Performed by: EMERGENCY MEDICINE

## 2024-11-15 PROCEDURE — 96374 THER/PROPH/DIAG INJ IV PUSH: CPT

## 2024-11-15 PROCEDURE — 99285 EMERGENCY DEPT VISIT HI MDM: CPT

## 2024-11-15 PROCEDURE — 36415 COLL VENOUS BLD VENIPUNCTURE: CPT

## 2024-11-15 PROCEDURE — 74177 CT ABD & PELVIS W/CONTRAST: CPT

## 2024-11-15 PROCEDURE — 25510000001 IOPAMIDOL PER 1 ML: Performed by: EMERGENCY MEDICINE

## 2024-11-15 PROCEDURE — 71250 CT THORAX DX C-: CPT

## 2024-11-15 PROCEDURE — 96376 TX/PRO/DX INJ SAME DRUG ADON: CPT

## 2024-11-15 RX ORDER — IOPAMIDOL 755 MG/ML
100 INJECTION, SOLUTION INTRAVASCULAR
Status: COMPLETED | OUTPATIENT
Start: 2024-11-15 | End: 2024-11-15

## 2024-11-15 RX ORDER — LIDOCAINE 50 MG/G
1 PATCH TOPICAL EVERY 24 HOURS
Qty: 10 EACH | Refills: 0 | Status: ON HOLD | OUTPATIENT
Start: 2024-11-15 | End: 2024-11-18

## 2024-11-15 RX ORDER — OXYCODONE AND ACETAMINOPHEN 5; 325 MG/1; MG/1
1 TABLET ORAL EVERY 6 HOURS PRN
Qty: 12 TABLET | Refills: 0 | Status: SHIPPED | OUTPATIENT
Start: 2024-11-15 | End: 2024-11-25 | Stop reason: HOSPADM

## 2024-11-15 RX ORDER — LIDOCAINE 4 G/G
1 PATCH TOPICAL ONCE
Status: DISCONTINUED | OUTPATIENT
Start: 2024-11-15 | End: 2024-11-16 | Stop reason: HOSPADM

## 2024-11-15 RX ORDER — HYDROCODONE BITARTRATE AND ACETAMINOPHEN 5; 325 MG/1; MG/1
1 TABLET ORAL EVERY 6 HOURS PRN
Status: DISCONTINUED | OUTPATIENT
Start: 2024-11-15 | End: 2024-11-16 | Stop reason: HOSPADM

## 2024-11-15 RX ADMIN — IOPAMIDOL 94 ML: 755 INJECTION, SOLUTION INTRAVENOUS at 15:55

## 2024-11-15 RX ADMIN — SODIUM CHLORIDE 500 ML: 9 INJECTION, SOLUTION INTRAVENOUS at 16:52

## 2024-11-15 RX ADMIN — HYDROMORPHONE HYDROCHLORIDE 1 MG: 1 INJECTION, SOLUTION INTRAMUSCULAR; INTRAVENOUS; SUBCUTANEOUS at 19:41

## 2024-11-15 RX ADMIN — HYDROCODONE BITARTRATE AND ACETAMINOPHEN 1 TABLET: 5; 325 TABLET ORAL at 14:27

## 2024-11-15 RX ADMIN — HYDROMORPHONE HYDROCHLORIDE 1 MG: 1 INJECTION, SOLUTION INTRAMUSCULAR; INTRAVENOUS; SUBCUTANEOUS at 16:50

## 2024-11-15 RX ADMIN — LIDOCAINE 1 PATCH: 4 PATCH TOPICAL at 16:54

## 2024-11-15 NOTE — ED PROVIDER NOTES
Time: 1:59 PM EST  Date of encounter:  11/15/2024  Independent Historian/Clinical History and Information was obtained by:   Patient    History is limited by: N/A    Chief Complaint   Patient presents with    Fall         History of Present Illness:  Patient is a 84 y.o. year old male who presents to the emergency department for evaluation of fall.  Patient states that he lost his footing and fell.  He is coming from [facility], assisted living.  He hit the left anterior side of his chest and abdomen on a pole.  He denies hitting his head. Family Member states he fell onto the right side and has right sided fractures from a fall last week.  He denies hip pain.    Patient Care Team  Primary Care Provider: Celina Salgado APRN    Past Medical History:     No Known Allergies  Past Medical History:   Diagnosis Date    Dementia     early    Hyperlipidemia      History reviewed. No pertinent surgical history.  History reviewed. No pertinent family history.    Home Medications:  Prior to Admission medications    Medication Sig Start Date End Date Taking? Authorizing Provider   atorvastatin (LIPITOR) 20 MG tablet Take 1 tablet by mouth Daily. 11/8/24   Celina Salgado APRN   donepezil (Aricept) 10 MG tablet Take 1 tablet by mouth Every Night. 11/8/24   Celina Salgado APRN   traMADol (ULTRAM) 50 MG tablet Take 1 tablet by mouth Every 12 (Twelve) Hours As Needed for Severe Pain. 10/17/24   Alejandro Arriaza MD        Social History:   Social History     Tobacco Use    Smoking status: Former     Types: Cigarettes     Start date: 1975    Smokeless tobacco: Never   Vaping Use    Vaping status: Never Used   Substance Use Topics    Alcohol use: Yes     Comment: patient and family states occasional rum drinker. place of living and EMS states patient drinks daily.    Drug use: Never         Review of Systems:  Review of Systems   Constitutional: Negative.    HENT: Negative.     Eyes: Negative.    Respiratory: Negative.    "  Cardiovascular: Negative.  Positive for chest pain.   Gastrointestinal: Negative.  Positive for abdominal pain.   Endocrine: Negative.    Genitourinary: Negative.    Musculoskeletal: Negative.  Positive for back pain.   Skin: Negative.    Allergic/Immunologic: Negative.    Neurological: Negative.    Hematological: Negative.    Psychiatric/Behavioral: Negative.          Physical Exam:  /84 (BP Location: Right arm)   Pulse 90   Temp 97.7 °F (36.5 °C) (Oral)   Resp 18   Ht 177.8 cm (70\")   Wt 95.3 kg (210 lb 1.6 oz)   SpO2 99%   BMI 30.15 kg/m²         Physical Exam  Vitals and nursing note reviewed.   Constitutional:       Appearance: Normal appearance.   HENT:      Head: Normocephalic and atraumatic.      Nose: Nose normal.      Mouth/Throat:      Mouth: Mucous membranes are moist.   Eyes:      Extraocular Movements: Extraocular movements intact.      Conjunctiva/sclera: Conjunctivae normal.      Pupils: Pupils are equal, round, and reactive to light.   Cardiovascular:      Rate and Rhythm: Normal rate and regular rhythm.      Heart sounds: Normal heart sounds.   Pulmonary:      Effort: Pulmonary effort is normal.      Breath sounds: Normal breath sounds.   Chest:      Chest wall: Tenderness present.       Abdominal:      General: Abdomen is flat. Bowel sounds are normal.      Palpations: Abdomen is soft.      Tenderness: There is abdominal tenderness in the left upper quadrant.       Musculoskeletal:         General: Normal range of motion.        Arms:       Cervical back: Normal range of motion and neck supple.   Skin:     General: Skin is warm and dry.      Findings: No bruising.   Neurological:      General: No focal deficit present.      Mental Status: He is alert and oriented to person, place, and time.   Psychiatric:         Mood and Affect: Mood normal.         Behavior: Behavior normal.                  Procedures:  Procedures      Medical Decision Making:      Comorbidities that affect " care:    Dementia    External Notes reviewed:    Previous ED Note: [facility] ED visit 10/17/2024 and Previous Radiological Studies: CT chest from 10/17/2024 showing nondisplaced right sixth and possibly fifth and seventh rib fractures      The following orders were placed and all results were independently analyzed by me:  Orders Placed This Encounter   Procedures    [facility] 02.  Shoulder Immobilizer/Sling    CT Abdomen Pelvis With Contrast    CT Chest Without Contrast Diagnostic    Comprehensive Metabolic Panel    CBC Auto Differential    Shoulder sling to left arm  Misc Nursing Order (Specify)    Incentive Spirometry    CBC & Differential       Medications Given in the Emergency Department:  Medications   HYDROcodone-acetaminophen (NORCO) 5-325 MG per tablet 1 tablet (1 tablet Oral Given 11/15/24 1427)   sodium chloride 0.9 % bolus 500 mL (has no administration in time range)   Lidocaine 4 % 1 patch (has no administration in time range)   HYDROmorphone (DILAUDID) injection 1 mg (has no administration in time range)   iopamidol (ISOVUE-370) 76 % injection 100 mL (94 mL Intravenous Given 11/15/24 1555)        ED Course:    The patient was initially evaluated in the triage area where orders were placed. The patient was later dispositioned by Baljinder Orta PA-C.      The patient was advised to stay for completion of workup which includes but is not limited to communication of labs and radiological results, reassessment and plan. The patient was advised that leaving prior to disposition by a provider could result in critical findings that are not communicated to the patient.     ED Course as of 11/15/24 1648   Fri Nov 15, 2024   1626  There is an acute fracture along the lateral eighth, ninth and 10th left ribs. [AJ]   1635 Patient denies blood in his stools.  States only time he gets blood in his stools is when he gets constipated and has to strain. [AJ]      ED Course User Index  [AJ] Baljinder Orta  MARIA ELENA KLEIN       Labs:    Lab Results (last 24 hours)       Procedure Component Value Units Date/Time    Comprehensive Metabolic Panel [[id number]]  (Abnormal) Collected: 11/15/24 1437    Specimen: Blood Updated: 11/15/24 1505     Glucose 99 mg/dL      BUN 13 mg/dL      Creatinine 0.86 mg/dL      Sodium 128 mmol/L      Potassium 4.4 mmol/L      Chloride 95 mmol/L      CO2 24.2 mmol/L      Calcium 9.0 mg/dL      Total Protein 7.2 g/dL      Albumin 4.2 g/dL      ALT (SGPT) 11 U/L      AST (SGOT) 20 U/L      Alkaline Phosphatase 81 U/L      Total Bilirubin 0.5 mg/dL      Globulin 3.0 gm/dL      A/G Ratio 1.4 g/dL      BUN/Creatinine Ratio 15.1     Anion Gap 8.8 mmol/L      eGFR 84.9 mL/min/1.73     Narrative:      GFR Normal >60  Chronic Kidney Disease <60  Kidney Failure <15    The GFR formula is only valid for adults with stable renal function between ages 18 and 70.    CBC & Differential [[id number]]  (Abnormal) Collected: 11/15/24 1437    Specimen: Blood Updated: 11/15/24 1447    Narrative:      The following orders were created for panel order CBC & Differential.  Procedure                               Abnormality         Status                     ---------                               -----------         ------                     CBC Auto Differential[[id number]]        Abnormal            Final result                 Please view results for these tests on the individual orders.    CBC Auto Differential [[id number]]  (Abnormal) Collected: 11/15/24 1437    Specimen: Blood Updated: 11/15/24 1447     WBC 8.99 10*3/mm3      RBC 4.23 10*6/mm3      Hemoglobin 12.2 g/dL      Hematocrit 37.2 %      MCV 87.9 fL      MCH 28.8 pg      MCHC 32.8 g/dL      RDW 16.0 %      RDW-SD 51.6 fl      MPV 9.5 fL      Platelets 279 10*3/mm3      Neutrophil % 73.1 %      Lymphocyte % 13.6 %      Monocyte % 10.8 %      Eosinophil % 1.3 %      Basophil % 0.6 %      Immature Grans % 0.6 %      Neutrophils, Absolute 6.58 10*3/mm3       Lymphocytes, Absolute 1.22 10*3/mm3      Monocytes, Absolute 0.97 10*3/mm3      Eosinophils, Absolute 0.12 10*3/mm3      Basophils, Absolute 0.05 10*3/mm3      Immature Grans, Absolute 0.05 10*3/mm3      nRBC 0.0 /100 WBC              Imaging:    CT Abdomen Pelvis With Contrast    Result Date: 11/15/2024  CT ABDOMEN PELVIS W CONTRAST, CT CHEST WO CONTRAST DIAGNOSTIC Date of Exam: 11/15/2024 3:42 PM EST Indication: left sided abd pain/fall. Comparison: CT chest 10/17/2024 Technique: Axial CT images were obtained of the abdomen and pelvis after the uneventful intravenous administration of iodinated contrast. Reconstructed coronal and sagittal images were also obtained. Automated exposure control and iterative construction methods were used. Findings: CT CHEST: MEDIASTINUM: The ascending aorta is prominent in size but unchanged measuring 4 cm. There is calcified atherosclerotic disease of the thoracic aorta. The heart is normal in size. No mass nor pericardial effusion. There is stable appearance to subcentimeter mediastinal lymph nodes. CORONARY ARTERIES: Heavily calcified atherosclerotic disease. LUNGS: There is some right lower lung atelectasis and probable scarring not significantly changed from prior examination. PLEURAL SPACE: There is stable appearance of subpleural nodularity and dystrophic pleural calcifications. There is a mild to moderate right pleural effusion. Left pleural effusion is essentially resolved from prior study. Lower lobe pleural and diaphragmatic calcifications are noted. No evidence for pneumothorax. There are fractures involving the costo chondral junctions of the anterior right second, third fourth, fifth and sixth ribs. There is some irregularity along the lateral right seventh rib and along the posterior right 11th rib which was noted on prior study and appears chronic. There is an acute fracture along the lateral eighth, ninth and 10th left ribs. Compression irregularity along the  inferior margin of the T3 vertebral body does not appear significantly changed. There is mild compression irregularities at T11 and T12 also unchanged. CT ABDOMEN AND PELVIS:  LIVER:  Unremarkable parenchyma without focal lesion. BILIARY/GALLBLADDER:  Unremarkable SPLEEN:  Unremarkable PANCREAS:  Unremarkable ADRENAL:  Unremarkable KIDNEYS:  Unremarkable parenchyma with no solid mass identified. No obstruction.  No calculus identified. GASTROINTESTINAL/MESENTERY: Evaluation of the gastrointestinal tract demonstrates enhancement along the rectal wall. This may be mucosal however correlate if patient has any rectal bleeding. There are few scattered diverticula noted without inflammatory changes. The appendix is normal in caliber. MESENTERIC VESSELS:  Patent. AORTA/IVC:  Normal caliber. RETROPERITONEUM/LYMPH NODES:  Unremarkable REPRODUCTIVE:  Unremarkable BLADDER:  Unremarkable OSSEUS STRUCTURES: 1. There are compression irregularities at L2 and L3 which do not appear acute. There is been surgical fixation of a left hip intertrochanteric fracture.     Impression: 1. Bilateral rib fractures primarily involving the right ribs along the anterior costochondral junction and involving the lateral left ribs. 2. Mild to moderate right pleural effusion. Pleural and diaphragmatic calcifications are noted suggestive of prior asbestosis exposure. There is some suspected scarring and atelectasis at the right lung base. 3. Abnormal enhancement along the rectal wall concerning for active rectal bleeding. Correlate clinically. Electronically Signed: Keshia Awan MD  11/15/2024 4:20 PM EST  Workstation ID: [id number]    CT Chest Without Contrast Diagnostic    Result Date: 11/15/2024  CT ABDOMEN PELVIS W CONTRAST, CT CHEST WO CONTRAST DIAGNOSTIC Date of Exam: 11/15/2024 3:42 PM EST Indication: left sided abd pain/fall. Comparison: CT chest 10/17/2024 Technique: Axial CT images were obtained of the abdomen and pelvis after the  uneventful intravenous administration of iodinated contrast. Reconstructed coronal and sagittal images were also obtained. Automated exposure control and iterative construction methods were used. Findings: CT CHEST: MEDIASTINUM: The ascending aorta is prominent in size but unchanged measuring 4 cm. There is calcified atherosclerotic disease of the thoracic aorta. The heart is normal in size. No mass nor pericardial effusion. There is stable appearance to subcentimeter mediastinal lymph nodes. CORONARY ARTERIES: Heavily calcified atherosclerotic disease. LUNGS: There is some right lower lung atelectasis and probable scarring not significantly changed from prior examination. PLEURAL SPACE: There is stable appearance of subpleural nodularity and dystrophic pleural calcifications. There is a mild to moderate right pleural effusion. Left pleural effusion is essentially resolved from prior study. Lower lobe pleural and diaphragmatic calcifications are noted. No evidence for pneumothorax. There are fractures involving the costo chondral junctions of the anterior right second, third fourth, fifth and sixth ribs. There is some irregularity along the lateral right seventh rib and along the posterior right 11th rib which was noted on prior study and appears chronic. There is an acute fracture along the lateral eighth, ninth and 10th left ribs. Compression irregularity along the inferior margin of the T3 vertebral body does not appear significantly changed. There is mild compression irregularities at T11 and T12 also unchanged. CT ABDOMEN AND PELVIS:  LIVER:  Unremarkable parenchyma without focal lesion. BILIARY/GALLBLADDER:  Unremarkable SPLEEN:  Unremarkable PANCREAS:  Unremarkable ADRENAL:  Unremarkable KIDNEYS:  Unremarkable parenchyma with no solid mass identified. No obstruction.  No calculus identified. GASTROINTESTINAL/MESENTERY: Evaluation of the gastrointestinal tract demonstrates enhancement along the rectal wall.  This may be mucosal however correlate if patient has any rectal bleeding. There are few scattered diverticula noted without inflammatory changes. The appendix is normal in caliber. MESENTERIC VESSELS:  Patent. AORTA/IVC:  Normal caliber. RETROPERITONEUM/LYMPH NODES:  Unremarkable REPRODUCTIVE:  Unremarkable BLADDER:  Unremarkable OSSEUS STRUCTURES: 1. There are compression irregularities at L2 and L3 which do not appear acute. There is been surgical fixation of a left hip intertrochanteric fracture.     Impression: 1. Bilateral rib fractures primarily involving the right ribs along the anterior costochondral junction and involving the lateral left ribs. 2. Mild to moderate right pleural effusion. Pleural and diaphragmatic calcifications are noted suggestive of prior asbestosis exposure. There is some suspected scarring and atelectasis at the right lung base. 3. Abnormal enhancement along the rectal wall concerning for active rectal bleeding. Correlate clinically. Electronically Signed: Keshia Awan MD  11/15/2024 4:20 PM EST  Workstation ID: [id number]       Differential Diagnosis and Discussion:      Trauma:  Differential diagnosis considered but not limited to were subarachnoid hemorrhage, intracranial bleeding, pneumothorax, cardiac contusion, lung contusion, intra-abdominal bleeding, and compartment syndrome of any extremity or other significant traumatic pathology    All labs were reviewed and interpreted by me.  CT scan radiology impression was interpreted by me.    MDM                   Patient Care Considerations:    SEPSIS was considered but is NOT present in the emergency department as SIRS criteria is not present.      Consultants/Shared Management Plan:    None    Social Determinants of Health:    Patient is a nursing home/assisted living resident and has reliable access to care.      Disposition and Care Coordination:    Discharged: The patient is suitable and stable for discharge with no need for  consideration of admission.    I have explained the patient´s condition, diagnoses and treatment plan based on the information available to me at this time. I have answered questions and addressed any concerns. The patient has a good  understanding of the patient´s diagnosis, condition, and treatment plan as can be expected at this point. The vital signs have been stable. The patient´s condition is stable and appropriate for discharge from the emergency department.      The patient will pursue further outpatient evaluation with the primary care physician or other designated or consulting physician as outlined in the discharge instructions. They are agreeable to this plan of care and follow-up instructions have been explained in detail. The patient has received these instructions in written format and has expressed an understanding of the discharge instructions. The patient is aware that any significant change in condition or worsening of symptoms should prompt an immediate return to this or the closest emergency department or call to 911.  I have explained discharge medications and the need for follow up with the patient/caretakers. This was also printed in the discharge instructions. Patient was discharged with the following medications and follow up:      Medication List        New Prescriptions      lidocaine 5 %  Commonly known as: LIDODERM  Place 1 patch on the skin as directed by provider Daily. Remove & Discard patch within 12 hours or as directed by MD               Where to Get Your Medications        These medications [place] [facility] [place] [address] - [phone]  - [phone] FX  [address], [facility] [address]      Phone: [phone]   lidocaine 5 %      Celina Salgado APRN  [address]  [address]  [place] [facility] [address]  [phone]             Final diagnoses:   Closed fracture of multiple ribs of left  side, initial encounter   Hyponatremia   Fall at nursing home, initial encounter        ED Disposition       ED Disposition   Discharge    Condition   Stable    Comment   --               This medical record created using voice recognition software.             Baljinder Orta PA-C  11/15/24 [id number]

## 2024-11-15 NOTE — DISCHARGE INSTRUCTIONS
CT of your abdomen negative   CT your chest shows new left-sided rib fractures of your eighth, ninth and 10th ribs.    Please use incentive spirometer every hour while awake.  Wear your shoulder sling on the left side.  I have sent pain medicine to the pharmacy.  Have also sent lidocaine patches.  Follow-up with your PCP

## 2024-11-17 ENCOUNTER — HOSPITAL ENCOUNTER (INPATIENT)
Facility: HOSPITAL | Age: 84
LOS: 7 days | Discharge: NURSING, REHAB OR LONG-TERM CARE | DRG: 184 | End: 2024-11-25
Attending: EMERGENCY MEDICINE | Admitting: STUDENT IN AN ORGANIZED HEALTH CARE EDUCATION/TRAINING PROGRAM
Payer: MEDICARE

## 2024-11-17 DIAGNOSIS — I34.0 NONRHEUMATIC MITRAL VALVE REGURGITATION: ICD-10-CM

## 2024-11-17 DIAGNOSIS — J90 PLEURAL EFFUSION: ICD-10-CM

## 2024-11-17 DIAGNOSIS — I51.9 LV DYSFUNCTION: ICD-10-CM

## 2024-11-17 DIAGNOSIS — F03.B4 MODERATE DEMENTIA WITH ANXIETY, UNSPECIFIED DEMENTIA TYPE: ICD-10-CM

## 2024-11-17 DIAGNOSIS — S22.49XA MULTIPLE RIB FRACTURES INVOLVING FOUR OR MORE RIBS: Primary | ICD-10-CM

## 2024-11-17 DIAGNOSIS — R26.2 DIFFICULTY WALKING: ICD-10-CM

## 2024-11-17 PROCEDURE — 99285 EMERGENCY DEPT VISIT HI MDM: CPT

## 2024-11-18 ENCOUNTER — APPOINTMENT (OUTPATIENT)
Dept: CARDIOLOGY | Facility: HOSPITAL | Age: 84
DRG: 184 | End: 2024-11-18
Payer: MEDICARE

## 2024-11-18 ENCOUNTER — APPOINTMENT (OUTPATIENT)
Dept: CT IMAGING | Facility: HOSPITAL | Age: 84
DRG: 184 | End: 2024-11-18
Payer: MEDICARE

## 2024-11-18 PROBLEM — R07.81 RIB PAIN ON LEFT SIDE: Status: ACTIVE | Noted: 2024-11-18

## 2024-11-18 LAB
ALBUMIN SERPL-MCNC: 3.7 G/DL (ref 3.5–5.2)
ALBUMIN/GLOB SERPL: 1.4 G/DL
ALP SERPL-CCNC: 75 U/L (ref 39–117)
ALT SERPL W P-5'-P-CCNC: 7 U/L (ref 1–41)
ANION GAP SERPL CALCULATED.3IONS-SCNC: 10.7 MMOL/L (ref 5–15)
AST SERPL-CCNC: 19 U/L (ref 1–40)
BASOPHILS # BLD AUTO: 0.03 10*3/MM3 (ref 0–0.2)
BASOPHILS NFR BLD AUTO: 0.4 % (ref 0–1.5)
BH CV ECHO MEAS - AO MAX PG: 7 MMHG
BH CV ECHO MEAS - AO MEAN PG: 3 MMHG
BH CV ECHO MEAS - AO ROOT DIAM: 3.5 CM
BH CV ECHO MEAS - AO V2 MAX: 132 CM/SEC
BH CV ECHO MEAS - AO V2 VTI: 22.9 CM
BH CV ECHO MEAS - AVA(I,D): 2.37 CM2
BH CV ECHO MEAS - EDV(CUBED): 195.1 ML
BH CV ECHO MEAS - EDV(MOD-SP2): 139 ML
BH CV ECHO MEAS - EDV(MOD-SP4): 174 ML
BH CV ECHO MEAS - EF(MOD-BP): 33.7 %
BH CV ECHO MEAS - EF(MOD-SP2): 35.5 %
BH CV ECHO MEAS - EF(MOD-SP4): 32.2 %
BH CV ECHO MEAS - ESV(CUBED): 103.8 ML
BH CV ECHO MEAS - ESV(MOD-SP2): 89.7 ML
BH CV ECHO MEAS - ESV(MOD-SP4): 118 ML
BH CV ECHO MEAS - FS: 19 %
BH CV ECHO MEAS - IVS/LVPW: 1 CM
BH CV ECHO MEAS - IVSD: 0.9 CM
BH CV ECHO MEAS - LA DIMENSION: 4.2 CM
BH CV ECHO MEAS - LAT PEAK E' VEL: 9.4 CM/SEC
BH CV ECHO MEAS - LV MASS(C)D: 203.5 GRAMS
BH CV ECHO MEAS - LV MAX PG: 3.4 MMHG
BH CV ECHO MEAS - LV MEAN PG: 2 MMHG
BH CV ECHO MEAS - LV V1 MAX: 92.6 CM/SEC
BH CV ECHO MEAS - LV V1 VTI: 17.3 CM
BH CV ECHO MEAS - LVIDD: 5.8 CM
BH CV ECHO MEAS - LVIDS: 4.7 CM
BH CV ECHO MEAS - LVOT AREA: 3.1 CM2
BH CV ECHO MEAS - LVOT DIAM: 2 CM
BH CV ECHO MEAS - LVPWD: 0.9 CM
BH CV ECHO MEAS - MED PEAK E' VEL: 7.6 CM/SEC
BH CV ECHO MEAS - MV A MAX VEL: 88.6 CM/SEC
BH CV ECHO MEAS - MV DEC SLOPE: 507 CM/SEC2
BH CV ECHO MEAS - MV DEC TIME: 0.19 SEC
BH CV ECHO MEAS - MV E MAX VEL: 95.9 CM/SEC
BH CV ECHO MEAS - MV E/A: 1.08
BH CV ECHO MEAS - MV MEAN PG: 2 MMHG
BH CV ECHO MEAS - MV V2 VTI: 18.9 CM
BH CV ECHO MEAS - MVA(VTI): 2.9 CM2
BH CV ECHO MEAS - RVDD: 2.7 CM
BH CV ECHO MEAS - RVSP: 44 MMHG
BH CV ECHO MEAS - SV(LVOT): 54.3 ML
BH CV ECHO MEAS - SV(MOD-SP2): 49.3 ML
BH CV ECHO MEAS - SV(MOD-SP4): 56 ML
BH CV ECHO MEAS - TAPSE (>1.6): 2.2 CM
BH CV ECHO MEAS - TR MAX PG: 41.2 MMHG
BH CV ECHO MEAS - TR MAX VEL: 321 CM/SEC
BH CV ECHO MEASUREMENTS AVERAGE E/E' RATIO: 11.28
BILIRUB SERPL-MCNC: 0.5 MG/DL (ref 0–1.2)
BUN SERPL-MCNC: 11 MG/DL (ref 8–23)
BUN/CREAT SERPL: 14.9 (ref 7–25)
CALCIUM SPEC-SCNC: 9.1 MG/DL (ref 8.6–10.5)
CHLORIDE SERPL-SCNC: 95 MMOL/L (ref 98–107)
CO2 SERPL-SCNC: 24.3 MMOL/L (ref 22–29)
CREAT SERPL-MCNC: 0.74 MG/DL (ref 0.76–1.27)
DEPRECATED RDW RBC AUTO: 51 FL (ref 37–54)
EGFRCR SERPLBLD CKD-EPI 2021: 88.8 ML/MIN/1.73
EOSINOPHIL # BLD AUTO: 0.17 10*3/MM3 (ref 0–0.4)
EOSINOPHIL NFR BLD AUTO: 2.4 % (ref 0.3–6.2)
ERYTHROCYTE [DISTWIDTH] IN BLOOD BY AUTOMATED COUNT: 16.2 % (ref 12.3–15.4)
GEN 5 1HR TROPONIN T REFLEX: 28 NG/L
GLOBULIN UR ELPH-MCNC: 2.7 GM/DL
GLUCOSE SERPL-MCNC: 94 MG/DL (ref 65–99)
HCT VFR BLD AUTO: 34.2 % (ref 37.5–51)
HGB BLD-MCNC: 11.4 G/DL (ref 13–17.7)
IMM GRANULOCYTES # BLD AUTO: 0.01 10*3/MM3 (ref 0–0.05)
IMM GRANULOCYTES NFR BLD AUTO: 0.1 % (ref 0–0.5)
LEFT ATRIUM VOLUME INDEX: 25.7 ML/M2
LYMPHOCYTES # BLD AUTO: 1.19 10*3/MM3 (ref 0.7–3.1)
LYMPHOCYTES NFR BLD AUTO: 16.8 % (ref 19.6–45.3)
MCH RBC QN AUTO: 28.9 PG (ref 26.6–33)
MCHC RBC AUTO-ENTMCNC: 33.3 G/DL (ref 31.5–35.7)
MCV RBC AUTO: 86.6 FL (ref 79–97)
MONOCYTES # BLD AUTO: 0.96 10*3/MM3 (ref 0.1–0.9)
MONOCYTES NFR BLD AUTO: 13.5 % (ref 5–12)
NEUTROPHILS NFR BLD AUTO: 4.73 10*3/MM3 (ref 1.7–7)
NEUTROPHILS NFR BLD AUTO: 66.8 % (ref 42.7–76)
NRBC BLD AUTO-RTO: 0 /100 WBC (ref 0–0.2)
NT-PROBNP SERPL-MCNC: 1538 PG/ML (ref 0–1800)
PLATELET # BLD AUTO: 244 10*3/MM3 (ref 140–450)
PMV BLD AUTO: 10 FL (ref 6–12)
POTASSIUM SERPL-SCNC: 4.4 MMOL/L (ref 3.5–5.2)
PROT SERPL-MCNC: 6.4 G/DL (ref 6–8.5)
RBC # BLD AUTO: 3.95 10*6/MM3 (ref 4.14–5.8)
SODIUM SERPL-SCNC: 130 MMOL/L (ref 136–145)
TROPONIN T NUMERIC DELTA: 1 NG/L
TROPONIN T SERPL HS-MCNC: 27 NG/L
WBC NRBC COR # BLD AUTO: 7.09 10*3/MM3 (ref 3.4–10.8)
WHOLE BLOOD HOLD SPECIMEN: NORMAL

## 2024-11-18 PROCEDURE — 93010 ELECTROCARDIOGRAM REPORT: CPT | Performed by: STUDENT IN AN ORGANIZED HEALTH CARE EDUCATION/TRAINING PROGRAM

## 2024-11-18 PROCEDURE — 71250 CT THORAX DX C-: CPT

## 2024-11-18 PROCEDURE — 25010000002 MORPHINE PER 10 MG: Performed by: STUDENT IN AN ORGANIZED HEALTH CARE EDUCATION/TRAINING PROGRAM

## 2024-11-18 PROCEDURE — 25010000002 KETOROLAC TROMETHAMINE PER 15 MG: Performed by: STUDENT IN AN ORGANIZED HEALTH CARE EDUCATION/TRAINING PROGRAM

## 2024-11-18 PROCEDURE — 25010000002 ONDANSETRON PER 1 MG: Performed by: FAMILY MEDICINE

## 2024-11-18 PROCEDURE — 80053 COMPREHEN METABOLIC PANEL: CPT | Performed by: EMERGENCY MEDICINE

## 2024-11-18 PROCEDURE — 83880 ASSAY OF NATRIURETIC PEPTIDE: CPT | Performed by: EMERGENCY MEDICINE

## 2024-11-18 PROCEDURE — 99222 1ST HOSP IP/OBS MODERATE 55: CPT | Performed by: STUDENT IN AN ORGANIZED HEALTH CARE EDUCATION/TRAINING PROGRAM

## 2024-11-18 PROCEDURE — 25010000002 SULFUR HEXAFLUORIDE MICROSPH 60.7-25 MG RECONSTITUTED SUSPENSION: Performed by: FAMILY MEDICINE

## 2024-11-18 PROCEDURE — 93005 ELECTROCARDIOGRAM TRACING: CPT | Performed by: EMERGENCY MEDICINE

## 2024-11-18 PROCEDURE — 93306 TTE W/DOPPLER COMPLETE: CPT

## 2024-11-18 PROCEDURE — 85025 COMPLETE CBC W/AUTO DIFF WBC: CPT | Performed by: EMERGENCY MEDICINE

## 2024-11-18 PROCEDURE — 36415 COLL VENOUS BLD VENIPUNCTURE: CPT | Performed by: STUDENT IN AN ORGANIZED HEALTH CARE EDUCATION/TRAINING PROGRAM

## 2024-11-18 PROCEDURE — 97161 PT EVAL LOW COMPLEX 20 MIN: CPT

## 2024-11-18 PROCEDURE — 84484 ASSAY OF TROPONIN QUANT: CPT | Performed by: EMERGENCY MEDICINE

## 2024-11-18 PROCEDURE — 84484 ASSAY OF TROPONIN QUANT: CPT | Performed by: STUDENT IN AN ORGANIZED HEALTH CARE EDUCATION/TRAINING PROGRAM

## 2024-11-18 RX ORDER — ONDANSETRON 2 MG/ML
4 INJECTION INTRAMUSCULAR; INTRAVENOUS EVERY 6 HOURS PRN
Status: DISCONTINUED | OUTPATIENT
Start: 2024-11-18 | End: 2024-11-19

## 2024-11-18 RX ORDER — KETOROLAC TROMETHAMINE 15 MG/ML
15 INJECTION, SOLUTION INTRAMUSCULAR; INTRAVENOUS EVERY 6 HOURS PRN
Status: DISPENSED | OUTPATIENT
Start: 2024-11-18 | End: 2024-11-19

## 2024-11-18 RX ORDER — BISACODYL 10 MG
10 SUPPOSITORY, RECTAL RECTAL DAILY PRN
Status: DISCONTINUED | OUTPATIENT
Start: 2024-11-18 | End: 2024-11-25 | Stop reason: HOSPADM

## 2024-11-18 RX ORDER — AMOXICILLIN 250 MG
2 CAPSULE ORAL 2 TIMES DAILY PRN
Status: DISCONTINUED | OUTPATIENT
Start: 2024-11-18 | End: 2024-11-25 | Stop reason: HOSPADM

## 2024-11-18 RX ORDER — SODIUM CHLORIDE 9 MG/ML
40 INJECTION, SOLUTION INTRAVENOUS AS NEEDED
Status: DISCONTINUED | OUTPATIENT
Start: 2024-11-18 | End: 2024-11-25 | Stop reason: HOSPADM

## 2024-11-18 RX ORDER — SODIUM CHLORIDE 0.9 % (FLUSH) 0.9 %
10 SYRINGE (ML) INJECTION EVERY 12 HOURS SCHEDULED
Status: DISCONTINUED | OUTPATIENT
Start: 2024-11-18 | End: 2024-11-25 | Stop reason: HOSPADM

## 2024-11-18 RX ORDER — MORPHINE SULFATE 2 MG/ML
2 INJECTION, SOLUTION INTRAMUSCULAR; INTRAVENOUS EVERY 6 HOURS PRN
Status: DISPENSED | OUTPATIENT
Start: 2024-11-18 | End: 2024-11-25

## 2024-11-18 RX ORDER — HYDROCODONE BITARTRATE AND ACETAMINOPHEN 10; 325 MG/1; MG/1
1 TABLET ORAL EVERY 6 HOURS PRN
Status: DISPENSED | OUTPATIENT
Start: 2024-11-18 | End: 2024-11-21

## 2024-11-18 RX ORDER — SODIUM CHLORIDE 0.9 % (FLUSH) 0.9 %
10 SYRINGE (ML) INJECTION AS NEEDED
Status: DISCONTINUED | OUTPATIENT
Start: 2024-11-18 | End: 2024-11-25 | Stop reason: HOSPADM

## 2024-11-18 RX ORDER — POLYETHYLENE GLYCOL 3350 17 G/17G
17 POWDER, FOR SOLUTION ORAL DAILY PRN
Status: DISCONTINUED | OUTPATIENT
Start: 2024-11-18 | End: 2024-11-25 | Stop reason: HOSPADM

## 2024-11-18 RX ORDER — ATORVASTATIN CALCIUM 20 MG/1
20 TABLET, FILM COATED ORAL DAILY
Status: DISCONTINUED | OUTPATIENT
Start: 2024-11-18 | End: 2024-11-18

## 2024-11-18 RX ORDER — ATORVASTATIN CALCIUM 20 MG/1
20 TABLET, FILM COATED ORAL NIGHTLY
Status: DISCONTINUED | OUTPATIENT
Start: 2024-11-18 | End: 2024-11-25 | Stop reason: HOSPADM

## 2024-11-18 RX ORDER — BISACODYL 5 MG/1
5 TABLET, DELAYED RELEASE ORAL DAILY PRN
Status: DISCONTINUED | OUTPATIENT
Start: 2024-11-18 | End: 2024-11-25 | Stop reason: HOSPADM

## 2024-11-18 RX ORDER — HYDROCODONE BITARTRATE AND ACETAMINOPHEN 5; 325 MG/1; MG/1
1 TABLET ORAL EVERY 6 HOURS PRN
Status: DISCONTINUED | OUTPATIENT
Start: 2024-11-18 | End: 2024-11-19

## 2024-11-18 RX ORDER — LIDOCAINE 4 G/G
2 PATCH TOPICAL
Status: DISCONTINUED | OUTPATIENT
Start: 2024-11-18 | End: 2024-11-19 | Stop reason: SDUPTHER

## 2024-11-18 RX ORDER — MORPHINE SULFATE 2 MG/ML
1 INJECTION, SOLUTION INTRAMUSCULAR; INTRAVENOUS ONCE
Status: COMPLETED | OUTPATIENT
Start: 2024-11-18 | End: 2024-11-18

## 2024-11-18 RX ORDER — DONEPEZIL HYDROCHLORIDE 10 MG/1
10 TABLET, FILM COATED ORAL NIGHTLY
Status: DISCONTINUED | OUTPATIENT
Start: 2024-11-18 | End: 2024-11-25 | Stop reason: HOSPADM

## 2024-11-18 RX ADMIN — MORPHINE SULFATE 1 MG: 2 INJECTION, SOLUTION INTRAMUSCULAR; INTRAVENOUS at 04:14

## 2024-11-18 RX ADMIN — Medication 10 ML: at 20:00

## 2024-11-18 RX ADMIN — ONDANSETRON 4 MG: 2 INJECTION INTRAMUSCULAR; INTRAVENOUS at 09:34

## 2024-11-18 RX ADMIN — ATORVASTATIN CALCIUM 20 MG: 20 TABLET, FILM COATED ORAL at 20:00

## 2024-11-18 RX ADMIN — SULFUR HEXAFLUORIDE 5 ML: KIT at 16:37

## 2024-11-18 RX ADMIN — HYDROCODONE BITARTRATE AND ACETAMINOPHEN 1 TABLET: 5; 325 TABLET ORAL at 18:13

## 2024-11-18 RX ADMIN — HYDROCODONE BITARTRATE AND ACETAMINOPHEN 1 TABLET: 10; 325 TABLET ORAL at 09:34

## 2024-11-18 RX ADMIN — Medication 10 ML: at 09:25

## 2024-11-18 RX ADMIN — KETOROLAC TROMETHAMINE 15 MG: 15 INJECTION, SOLUTION INTRAMUSCULAR; INTRAVENOUS at 05:54

## 2024-11-18 RX ADMIN — DONEPEZIL HYDROCHLORIDE 10 MG: 10 TABLET, FILM COATED ORAL at 20:00

## 2024-11-18 RX ADMIN — LIDOCAINE 2 PATCH: 4 PATCH TOPICAL at 05:42

## 2024-11-18 RX ADMIN — KETOROLAC TROMETHAMINE 15 MG: 15 INJECTION, SOLUTION INTRAMUSCULAR; INTRAVENOUS at 22:26

## 2024-11-18 RX ADMIN — SENNOSIDES AND DOCUSATE SODIUM 2 TABLET: 50; 8.6 TABLET ORAL at 10:41

## 2024-11-18 NOTE — PLAN OF CARE
Goal Outcome Evaluation:  Plan of Care Reviewed With: patient        Progress: no change  Outcome Evaluation: Pt presents with limitations that impede their ability to safely and independently transfer and ambulate. The skills of a therapist will be required to safely and effectively implement the following treatment plan to restore maximal level of function.    Anticipated Discharge Disposition (PT): home with 24/7 care, home with home health

## 2024-11-18 NOTE — THERAPY EVALUATION
Acute Care - Physical Therapy Initial Evaluation   [facility]     Patient Name: Arnaldo Benoit  : 1940  MRN: [id number]  Today's Date: 2024      Visit Dx:     ICD-10-CM ICD-9-CM   1. Multiple rib fractures involving four or more ribs  S22.49XA 807.09   2. Pleural effusion  J90 511.9   3. Difficulty walking  R26.2 719.7     Patient Active Problem List   Diagnosis    Skin cancer of scalp    Rib pain on left side     Past Medical History:   Diagnosis Date    Dementia     early    Hyperlipidemia      History reviewed. No pertinent surgical history.  PT Assessment (Last 12 Hours)       PT Evaluation and Treatment       Row Name 24 1300          Physical Therapy Time and Intention    Subjective Information no complaints  -CS     Document Type evaluation  -CS     Mode of Treatment individual therapy;physical therapy  -CS     Patient Effort good  -CS     Symptoms Noted During/After Treatment fatigue  -CS       Row Name 24 1300          General Information    Patient Profile Reviewed yes  -CS     Patient Observations alert;cooperative;agree to therapy  -CS     Prior Level of Function independent:;all household mobility;gait;transfer;bed mobility;ADL's  Pt reports he was independent with all mobility and ADLs. Difficulty gaining accurate information and specifics on PLOF due to confusion  -CS     Equipment Currently Used at Home none;shower chair  Pt reports not using an assistive device for mobility. He has a walk-in shower with seat and grab bar. No home O2  -CS     Existing Precautions/Restrictions fall  multiple falls recently with rib fractures on right and left.  -CS     Limitations/Impairments safety/cognitive  -CS     Barriers to Rehab none identified  -CS       Row Name 24 1300          Living Environment    Current Living Arrangements assisted living facility  -CS     People in Home alone  -CS     Primary Care Provided by self  -CS       Row Name 24 1300          Pain     Pretreatment Pain Rating 0/10 - no pain  -CS     Posttreatment Pain Rating 0/10 - no pain  -CS       Row Name 11/18/24 1300          Cognition    Orientation Status (Cognition) oriented to;person  -CS       Row Name 11/18/24 1300          Range of Motion Comprehensive    General Range of Motion bilateral lower extremity ROM WFL  -       Row Name 11/18/24 1300          Strength Comprehensive (MMT)    General Manual Muscle Testing (MMT) Assessment lower extremity strength deficits identified  -CS     Comment, General Manual Muscle Testing (MMT) Assessment BLEs assessed at 4-/5  -       Row Name 11/18/24 1300          Bed Mobility    Bed Mobility bed mobility (all) activities  -CS     All Activities, [place] (Bed Mobility) verbal cues;supervision  -CS     Bed Mobility, Safety Issues decreased use of legs for bridging/pushing;decreased use of arms for pushing/pulling  -CS     Assistive Device (Bed Mobility) bed rails  -       Row Name 11/18/24 1300          Transfers    Transfers sit-stand transfer;stand-sit transfer  -       Row Name 11/18/24 1300          Sit-Stand Transfer    Sit-Stand [place] (Transfers) verbal cues;contact guard  -CS     Assistive Device (Sit-Stand Transfers) other (see comments)  no AD  -CS       Row Name 11/18/24 1300          Stand-Sit Transfer    Stand-Sit [place] (Transfers) verbal cues;contact guard  -CS     Assistive Device (Stand-Sit Transfers) --  no AD  -CS       Row Name 11/18/24 1300          Gait/Stairs (Locomotion)    Gait/Stairs Locomotion gait/ambulation assistive device  -CS     [place] Level (Gait) verbal cues;contact guard  -CS     Assistive Device (Gait) --  no AD  -CS     Patient was able to Ambulate yes  -CS     Distance in Feet (Gait) 25  -CS     Pattern (Gait) step-through  -CS     Deviations/Abnormal Patterns (Gait) stride length decreased;gait speed decreased  -       Row Name 11/18/24 1300          Safety Issues/Impairments Affecting  Functional Mobility    Safety Issues Affecting Function (Mobility) insight into deficits/self-awareness;impulsivity;judgment;problem-solving  -CS     Impairments Affecting Function (Mobility) cognition;balance;endurance/activity tolerance;pain;strength  -CS       Row Name 11/18/24 1300          Balance    Balance Assessment standing dynamic balance  -CS     Dynamic Standing Balance verbal cues;contact guard;minimal assist  -CS     Position/Device Used, Standing Balance unsupported  -CS       Row Name             Wound 11/18/24 0435 Right upper arm    Wound - Properties Group Placement Date: 11/18/24  -JS Placement Time: 0435 -JS Side: Right  -JS Orientation: upper  -JS Location: arm  -JS    Retired Wound - Properties Group Placement Date: 11/18/24  -JS Placement Time: 0435  -JS Side: Right  -JS Orientation: upper  -JS Location: arm  -JS    Retired Wound - Properties Group Placement Date: 11/18/24  -JS Placement Time: 0435  -JS Side: Right  -JS Orientation: upper  -JS Location: arm  -JS    Retired Wound - Properties Group Date first assessed: 11/18/24  -JS Time first assessed: 0435  -JS Side: Right  -JS Location: arm  -JS      Row Name 11/18/24 1300          Plan of Care Review    Plan of Care Reviewed With patient  -CS     Progress no change  -CS     Outcome Evaluation Pt presents with limitations that impede their ability to safely and independently transfer and ambulate. The skills of a therapist will be required to safely and effectively implement the following treatment plan to restore maximal level of function.  -CS       Row Name 11/18/24 1300          Positioning and Restraints    Pre-Treatment Position sitting in chair/recliner  -CS     Post Treatment Position chair  -CS     In Chair sitting;call light within reach;encouraged to call for assist;exit alarm on;with nsg  -CS       Row Name 11/18/24 1300          Therapy Assessment/Plan (PT)    Rehab Potential (PT) good  -CS     Criteria for Skilled  Interventions Met (PT) yes;skilled treatment is necessary  -CS     Therapy Frequency (PT) daily  -CS     Predicted Duration of Therapy Intervention (PT) 10 days  -CS     Problem List (PT) problems related to;mobility;balance;cognition;strength;pain  -CS     Activity Limitations Related to Problem List (PT) unable to transfer safely;unable to ambulate safely  -CS       Row Name 11/18/24 1300          PT Evaluation Complexity    History, PT Evaluation Complexity 1-2 personal factors and/or comorbidities  -CS     Examination of Body Systems (PT Eval Complexity) total of 4 or more elements  -CS     Clinical Presentation (PT Evaluation Complexity) stable  -CS     Clinical Decision Making (PT Evaluation Complexity) low complexity  -CS     Overall Complexity (PT Evaluation Complexity) low complexity  -CS       Row Name 11/18/24 1300          Therapy Plan Review/Discharge Plan (PT)    Therapy Plan Review (PT) evaluation/treatment results reviewed;patient  -CS       Row Name 11/18/24 1300          Physical Therapy Goals    Bed Mobility Goal Selection (PT) bed mobility, PT goal 1  -CS     Transfer Goal Selection (PT) transfer, PT goal 1  -CS     Gait Training Goal Selection (PT) gait training, PT goal 1  -       Row Name 11/18/24 1300          Bed Mobility Goal 1 (PT)    Activity/Assistive Device (Bed Mobility Goal 1, PT) bed mobility activities, all  -CS     [place] Level/Cues Needed (Bed Mobility Goal 1, PT) independent  -CS     Time Frame (Bed Mobility Goal 1, PT) long term goal (LTG);10 days  -       Row Name 11/18/24 1300          Transfer Goal 1 (PT)    Activity/Assistive Device (Transfer Goal 1, PT) sit-to-stand/stand-to-sit;bed-to-chair/chair-to-bed  no AD or rolling walker  -CS     [place] Level/Cues Needed (Transfer Goal 1, PT) supervision required  -CS     Time Frame (Transfer Goal 1, PT) long term goal (LTG);10 days  -CS       Row Name 11/18/24 1300          Gait Training Goal 1 (PT)     Activity/Assistive Device (Gait Training Goal 1, PT) gait (walking locomotion);assistive device use;other (see comments)  no AD or with rolling walker  -CS     [place] Level (Gait Training Goal 1, PT) standby assist  -CS     Distance (Gait Training Goal 1, PT) 200  -CS     Time Frame (Gait Training Goal 1, PT) long term goal (LTG);10 days  -CS               User Key  (r) = Recorded By, (t) = Taken By, (c) = Cosigned By      Initials Name Provider Type    CS Sri Castellanos, PT Physical Therapist    Cesilia Mckeon, RN Registered Nurse                    Physical Therapy Education        No education to display                  PT Recommendation and Plan  Anticipated Discharge Disposition (PT): home with 24/7 care, home with home health  Planned Therapy Interventions (PT): balance training, bed mobility training, gait training, transfer training, strengthening  Therapy Frequency (PT): daily  Plan of Care Reviewed With: patient  Progress: no change  Outcome Evaluation: Pt presents with limitations that impede their ability to safely and independently transfer and ambulate. The skills of a therapist will be required to safely and effectively implement the following treatment plan to restore maximal level of function.   Outcome Measures       Row Name 11/18/24 1300             How much help from another person do you currently need...    Turning from your back to your side while in flat bed without using bedrails? 4  -CS      Moving from lying on back to sitting on the side of a flat bed without bedrails? 4  -CS      Moving to and from a bed to a chair (including a wheelchair)? 3  -CS      Standing up from a chair using your arms (e.g., wheelchair, bedside chair)? 3  -CS      Climbing 3-5 steps with a railing? 2  -CS      To walk in hospital room? 3  -CS      AM-PAC 6 Clicks Score (PT) 19  -CS         Functional Assessment    Outcome Measure Options AM-PAC 6 Clicks Basic Mobility (PT)  -CS                User Key   (r) = Recorded By, (t) = Taken By, (c) = Cosigned By      Initials Name Provider Type    CS Sri Castellanos, PT Physical Therapist                     Time Calculation:    PT Charges       Row Name 11/18/24 1335             Time Calculation    PT Received On 11/18/24  -CS      PT Goal Re-Cert Due Date 11/27/24  -CS         Untimed Charges    PT Eval/Re-eval Minutes 46  -CS         Total Minutes    Untimed Charges Total Minutes 46  -CS       Total Minutes 46  -CS                User Key  (r) = Recorded By, (t) = Taken By, (c) = Cosigned By      Initials Name Provider Type    Sri Swann, PT Physical Therapist                  Therapy Charges for Today       Code Description Service Date Service Provider Modifiers Qty    [id number] HC PT EVAL LOW COMPLEXITY 4 11/18/2024 Sri Castellanos, PT GP 1            PT G-Codes  Outcome Measure Options: AM-PAC 6 Clicks Basic Mobility (PT)  AM-PAC 6 Clicks Score (PT): 19    Sri Castellanos PT  11/18/2024

## 2024-11-18 NOTE — CONSULTS
Pulmonary / Critical Care Consult Note      Patient Name: Arnaldo Benoit  : 1940  MRN: [id number]  Primary Care Physician:  Celina Salgado APRN  Referring Physician: Darlene العلي MD  Date of admission: 2024    Subjective   Subjective     Reason for Consult/ Chief Complaint:   Pleural effusion    HPI:  Arnaldo Benoit is a 84 y.o. male with history of hypertension, hyperlipidemia, dementia, came in yesterday after a fall found to have several rib fractures.  Chest imaging also shows small bilateral effusions as well as atelectasis.  In his room this morning patient is doing well  He is on room air. He is able to work with physical therapy.  He denies shortness of breath, cough, fevers, chills, hemoptysis.  He denied all complaints on my exam today.    Review of Systems  Constitutional symptoms:  Denied complaints   Cardiovascular:  Denied complaints  Respiratory: Denied complaints  Gastrointestinal: Denied complaints  Neurologic: Denied complaints    Personal History     Past Medical History:   Diagnosis Date    Dementia     early    Hyperlipidemia        History reviewed. No pertinent surgical history.    Family History: family history is not on file. Otherwise pertinent FHx was reviewed and not pertinent to current issue.    Social History:  reports that he has quit smoking. His smoking use included cigarettes. He started smoking about 49 years ago. He has been exposed to tobacco smoke. He has never used smokeless tobacco. He reports current alcohol use. He reports that he does not use drugs.    Home Medications:  atorvastatin, donepezil, and oxyCODONE-acetaminophen    Allergies:  Allergies   Allergen Reactions    Beef-Derived Products Unknown - Low Severity       Objective    Objective     Vitals:   Temp:  [97.5 °F (36.4 °C)-97.9 °F (36.6 °C)] 97.5 °F (36.4 °C)  Heart Rate:  [85-97] 88  Resp:  [16-20] 16  BP: (115-175)/() 115/70    Physical Exam:  Vital Signs Reviewed   General:  Alert,  NAD. Lying in bed.    HEENT:  PERRL, EOMI.  OP  Neck:  Supple, no JVD, no thyromegaly  Chest:  good aeration, no work of breathing noted on room air  CV: RRR, no MGR, pulses 2+, equal.  Abd:  Soft, NT, ND, + BS  EXT:  no clubbing, no cyanosis, no edema, no joint tenderness  Neuro:  A&Ox3, CN grossly intact, no focal deficits.  Skin: No rashes or lesions noted    Result Review    Result Review:  I have personally reviewed the results from the time of this admission to 11/18/2024 14:20 EST and agree with these findings:  []  Laboratory  []  Microbiology  []  Radiology  []  EKG/Telemetry   []  Cardiology/Vascular   []  Pathology  []  Old records  []  Other:    Most notable findings include:   -     Assessment & Plan   Assessment / Plan     Active Hospital Problems:  Active Hospital Problems    Diagnosis     **Rib pain on left side      Impression:  Bilateral effusions, small  Multiple rib fractures status post fall  History of hypertension  History of dementia    Plan:  Patient is currently on room air, he denies shortness of breath  Chest CT personally reviewed.  There are small bilateral effusions noted with atelectasis  No thoracentesis at this time.  Encourage I-S and flutter valve use.  PT/OT as tolerated  Will order 2D echo to assess for heart failure.    VTE Prophylaxis:  Mechanical VTE prophylaxis orders are present.         Code Status and Medical Interventions: No CPR (Do Not Attempt to Resuscitate); Limited Support; No intubation (DNI)   Ordered at: 11/18/24 0342     Medical Intervention Limits:    No intubation (DNI)     Level Of Support Discussed With:    Health Care Surrogate     Code Status (Patient has no pulse and is not breathing):    No CPR (Do Not Attempt to Resuscitate)     Medical Interventions (Patient has pulse or is breathing):    Limited Support        Labs, imaging, notes, and medications personally reviewed.  Thank you for involving me in the care of this patient.    Electronically signed by  Gavin Friedman MD, 11/18/24, 2:20 PM EST.

## 2024-11-18 NOTE — H&P
[facility] HISTORY AND PHYSICAL  Date: 2024   Patient Name: Arnaldo Benoit  : 1940  MRN: [id number]  Primary Care Physician:  Celina Salgado APRN  Date of admission: 2024    Subjective   Subjective     Chief Complaint: Left rib cage pain    HPI:    Arnaldo Benoit is a 84 y.o. male with a past medical history of hyperlipidemia, dementia presented to the ED for evaluation of left-sided chest pain and rib pains.  [facility] is [facility] staying at [facility] and had a fall on Friday, landed on the left side.  Previously 2 weeks ago patient had a fall, landing on his right side.  Patient was here in the ED for evaluation on Friday and noted to have multiple rib fractures bilaterally.  Since then patient continues to have worsening pain in the left rib cage and associated shortness of breath tonight and has been sent to the ED from the living facility for further evaluation.  Patient is pleasantly confused due to dementia.  Other than pain on the left rib cage offers no other complaints at this time.    Upon arrival, vital signs temperature 97.9, pulse 90, respiratory 20, blood pressure 159/83 on room air saturating around 94%.  Troponin 27, proBNP 1538, sodium 130, rest of the CMP with no significant findings, normal WBC, hemoglobin 1.4, platelets 244.  CT chest without contrast showed interval slight increase in bilateral pleural effusions.  No pneumothorax is seen.  Multiple bilateral rib fractures are present bilaterally.  Patient has been admitted for management of pain and evaluation by physical therapy      Personal History     Past Medical History:   Diagnosis Date    Dementia     early    Hyperlipidemia          History reviewed. No pertinent surgical history.      History reviewed. No pertinent family history.      Social History     Socioeconomic History    Marital status:    Tobacco Use    Smoking status: Former     Types: Cigarettes      Start date: 1975    Smokeless tobacco: Never   Vaping Use    Vaping status: Never Used   Substance and Sexual Activity    Alcohol use: Yes     Comment: patient and family states occasional rum drinker. place of living and EMS states patient drinks daily.    Drug use: Never    Sexual activity: Defer         Home Medications:  atorvastatin, donepezil, lidocaine, oxyCODONE-acetaminophen, and traMADol    Allergies:  No Known Allergies    Objective   Objective     Vitals:   Temp:  [97.9 °F (36.6 °C)] 97.9 °F (36.6 °C)  Heart Rate:  [85-90] 85  Resp:  [20] 20  BP: (133-159)/(77-85) 148/85    Physical Exam    Constitutional: Awake, alert   HENT: NCAT, mucous membranes moist   Respiratory: Clear to auscultation bilaterally, nonlabored respirations    Cardiovascular: RRR, no murmurs   Gastrointestinal: Positive bowel sounds, soft, nontender, nondistended   Musculoskeletal: No bilateral ankle edema, no clubbing or cyanosis to extremities   Neurologic: Mild dementia, speech clear   Skin: No rashes     Result Review    Result Review:  I have personally reviewed the results from the time of this admission to 11/18/2024 03:42 EST and agree with these findings:  [x]  Laboratory  []  Microbiology  [x]  Radiology  []  EKG/Telemetry   []  Cardiology/Vascular   []  Pathology  []  Old records  []  Other:        Imaging Results (Last 24 Hours)       Procedure Component Value Units Date/Time    CT Chest Without Contrast Diagnostic [[id number]] Collected: 11/18/24 0059     Updated: 11/18/24 0117    Narrative:      CT CHEST WO CONTRAST DIAGNOSTIC-     Date of exam: 11/18/2024, 12:48 A.M.     Indications: left rib pain after fall     Comparison: 11/15/2024.     TECHNIQUE:   Axial CT images were obtained of the chest without contrast  administration. Reconstructed 2D coronal and sagittal images were also  obtained. Automated exposure control and iterative construction methods  were used.     FINDINGS:  Again multiple bilateral anterior,  anterolateral acute-to-subacute rib  fractures (right: 2nd through 11th; left: 3rd through 12th) are seen as  described in the prior exam report. Please note that the bilateral 11th  and the left 12th rib fractures are posterior in location. All of the  rib fractures are extra-articular and closed. They are noncomminuted and  nonsegmental. They are nondisplaced or minimally displaced. No definite  new rib fractures are appreciated. Interval increase in bilateral  pleural effusions is noted, small-to-moderate in size, and larger on the  right than the left. There may be atelectasis, infiltrate(s), and/or  pulmonary contusion, especially in the lung bases, greater on the right  than the left. No pneumothorax is seen. Calcified and noncalcified  pleural plaques are present, as before. Minimal filling defects are seen  within the trachea and mainstem bronchi and probably represent mucus.  The ascending aortic maximum diameter is about 4 cm, which is at the  upper limits of normal. No significant change in the T3, T11, T12, and  L2 vertebral compression fractures since 11/15/2024. These findings are  age-indeterminate but are probably subacute in nature.          Impression:      Interval slight increase in the bilateral pleural effusions. No  pneumothorax is seen. Multiple bilateral rib fractures are present, as  described above and previously. Otherwise, no significant interval  change is seen since 11/15/2024. Please see above comments for further  detail.        Portions of this note were completed with a voice recognition program.     Electronically Signed By-Alexis Hernandez MD On:11/18/2024 1:15 AM                Lidocaine, 2 patch, Transdermal, Q24H  Lidocaine, 2 patch, Transdermal, Q24H  Morphine, 1 mg, Intravenous, Once         Assessment & Plan   Assessment / Plan     Assessment/Plan:   Left rib cage pain  Recent fall on 11/15/2024  Multiple rib fractures bilaterally (bilateral anterior, anterolateral  acute-to-subacute rib   fractures (right: 2nd through 11th; left: 3rd through 12th)   Small to moderate bilateral pleural effusions, right greater than left  Mild hyponatremia  Hyperlipidemia  Dementia    Plan  Admit to observation, remote telemetry  Continuous pulse ox  Incentive spirometry  Pain control with analgesics as needed and lidocaine patch  Fall precautions  PT OT consult  Regular diet  Resume other appropriate home medications once reconciled    VTE Prophylaxis:  Mechanical VTE prophylaxis orders are signed & held.      CODE STATUS:    Medical Intervention Limits: No intubation (DNI)  Level Of Support Discussed With: Health Care Surrogate  Code Status (Patient has no pulse and is not breathing): No CPR (Do Not Attempt to Resuscitate)  Medical Interventions (Patient has pulse or is breathing): Limited Support      Admission Status:  I believe this patient meets observation status.    Part of this note may be an electronic transcription/translation of spoken language to printed text using the Dragon Dictation System    Veronica Richards MD

## 2024-11-18 NOTE — PLAN OF CARE
Goal Outcome Evaluation:   Alert and oriented x2, disoriented to situation and place. Frequently reoriented and educated on call light use. Up in chair. Bed/chair alarm on. x1 complaint of pain, x1 complaint of nausea, medicated per mar.

## 2024-11-18 NOTE — PLAN OF CARE
Goal Outcome Evaluation:  Plan of Care Reviewed With: patient, family (Daughter)        Progress: no change  Outcome Evaluation: Patient arrived to floor from ED. Alert & oriented x 4, with confusion to place and situation due to dementia history. Reorientation to place and situation. Patient from assisted living facility, recent fall with broken ribs. Fall risk in place to prevent patient from falling and frequent reorientation to use of call light and bed alarm in place. Pain managed per order. Appears to be resting in bed sleeping.

## 2024-11-18 NOTE — PROGRESS NOTES
Reviewed hospitalization plan provided by Dr. Richards, agree with assessment and plan, 84-year-old male with dementia, hospitalized after falling, left rib cage pain, found to have multiple rib fractures, with moderate bilateral pleural effusions, pulmonary consulted for further evaluation, pain control, incentive spirometer, clinical course will dictate further management, discussed with nurse at the bedside.    Electronically signed by Darlene العلي MD, 11/18/2024, 17:01 EST.    Portions of this documentation were transcribed electronically from a voice recognition software.  I confirm all data accurately represents the service(s) I performed at today's visit.

## 2024-11-18 NOTE — ED PROVIDER NOTES
Time: 12:15 AM EST  Date of encounter:  11/17/2024  Independent Historian/Clinical History and Information was obtained by:   Patient and Family  Chief Complaint: Left-sided chest pain and rib pain    History is limited by: Dementia    History of Present Illness:  Patient is a 84 y.o. year old male who presents to the emergency department for evaluation of left-sided chest pain and rib pain.  According to the family at bedside, the patient had fallen on Friday at [facility].  The patient was brought to the emergency room and it was discovered the patient had multiple ribs fractures.  The patient was placed on pain medicine and Lidoderm patch.  The patient has had continued left-sided pain.  Tonight, the patient is complaining of more pain and states that he did not feel good and was having some difficulty breathing so they sent him here for evaluation.  The patient is pleasantly confused she is in no distress and does complain of some left-sided pain mostly when he takes a deep breath but is also tender to touch.  He denies shortness of breath.  There is been no vomiting.  Patient denies pain in other location.  The patient is a very limited historian.    HPI    Patient Care Team  Primary Care Provider: Celina Salgado APRN    Past Medical History:     Allergies   Allergen Reactions    Beef-Derived Products Unknown - Low Severity     Past Medical History:   Diagnosis Date    Dementia     early    Hyperlipidemia      History reviewed. No pertinent surgical history.  History reviewed. No pertinent family history.    Home Medications:  Prior to Admission medications    Medication Sig Start Date End Date Taking? Authorizing Provider   atorvastatin (LIPITOR) 20 MG tablet Take 1 tablet by mouth Daily. 11/8/24   Celina Salgado APRN   donepezil (Aricept) 10 MG tablet Take 1 tablet by mouth Every Night. 11/8/24   Celina Salgado APRN   lidocaine (LIDODERM) 5 % Place 1 patch on the skin as directed by  "provider Daily. Remove & Discard patch within 12 hours or as directed by MD 11/15/24   Baljinder Orta PA-C   oxyCODONE-acetaminophen (PERCOCET) 5-325 MG per tablet Take 1 tablet by mouth Every 6 (Six) Hours As Needed for Severe Pain. 11/15/24   Alexander Westbrook MD   traMADol (ULTRAM) 50 MG tablet Take 1 tablet by mouth Every 12 (Twelve) Hours As Needed for Severe Pain. 10/17/24   Alejandro Arriaza MD        Social History:   Social History     Tobacco Use    Smoking status: Former     Types: Cigarettes     Start date: 1975     Passive exposure: Past    Smokeless tobacco: Never   Vaping Use    Vaping status: Never Used   Substance Use Topics    Alcohol use: Yes     Comment: patient and family states occasional rum drinker. place of living and EMS states patient drinks daily.    Drug use: Never         Review of Systems:  Review of Systems   Constitutional:  Negative for chills, diaphoresis and fever.   HENT:  Negative for congestion, postnasal drip, rhinorrhea and sore throat.    Eyes:  Negative for photophobia.   Respiratory:  Positive for shortness of breath. Negative for cough and chest tightness.    Cardiovascular:  Positive for chest pain. Negative for palpitations and leg swelling.   Gastrointestinal:  Negative for abdominal pain, diarrhea, nausea and vomiting.   Genitourinary:  Negative for difficulty urinating, dysuria, flank pain, frequency, hematuria and urgency.   Musculoskeletal:  Negative for neck pain and neck stiffness.   Skin:  Negative for pallor and rash.   Neurological:  Negative for dizziness, syncope, weakness, numbness and headaches.   Hematological:  Negative for adenopathy. Does not bruise/bleed easily.   Psychiatric/Behavioral: Negative.          Physical Exam:  /74 (BP Location: Right arm, Patient Position: Lying)   Pulse 89   Temp 97.8 °F (36.6 °C) (Oral)   Resp 20   Ht 177.8 cm (70\")   Wt 88.8 kg (195 lb 12.3 oz)   SpO2 93%   BMI 28.09 kg/m²     Physical Exam  Vitals and " nursing note reviewed.   Constitutional:       General: He is not in acute distress.     Appearance: Normal appearance. He is not ill-appearing, toxic-appearing or diaphoretic.   HENT:      Head: Normocephalic and atraumatic.      Mouth/Throat:      Mouth: Mucous membranes are moist.   Eyes:      Pupils: Pupils are equal, round, and reactive to light.   Cardiovascular:      Rate and Rhythm: Normal rate and regular rhythm.      Pulses: Normal pulses.           Carotid pulses are 2+ on the right side and 2+ on the left side.       Radial pulses are 2+ on the right side and 2+ on the left side.        Femoral pulses are 2+ on the right side and 2+ on the left side.       Popliteal pulses are 2+ on the right side and 2+ on the left side.        Dorsalis pedis pulses are 2+ on the right side and 2+ on the left side.        Posterior tibial pulses are 2+ on the right side and 2+ on the left side.      Heart sounds: Normal heart sounds. No murmur heard.  Pulmonary:      Effort: Pulmonary effort is normal. No accessory muscle usage, respiratory distress or retractions.      Breath sounds: Examination of the right-upper field reveals decreased breath sounds. Examination of the left-upper field reveals decreased breath sounds. Examination of the right-middle field reveals decreased breath sounds. Examination of the left-middle field reveals decreased breath sounds. Examination of the right-lower field reveals decreased breath sounds. Examination of the left-lower field reveals decreased breath sounds. Decreased breath sounds present. No wheezing, rhonchi or rales.   Chest:      Chest wall: Swelling and tenderness present. No mass, deformity or crepitus.          Comments: The patient appears to have swelling of the left anterior and lateral chest wall.  I do not feel any palpable crepitance.  Patient does have some slight area of ecchymosis in the anterior most inferior chest.  Abdominal:      General: Abdomen is flat. There  is no distension.      Palpations: Abdomen is soft. There is no mass or pulsatile mass.      Tenderness: There is no abdominal tenderness. There is no right CVA tenderness, left CVA tenderness, guarding or rebound.      Comments: No rigidity   Musculoskeletal:         General: No swelling, tenderness or deformity.      Cervical back: Neck supple. No tenderness.      Right lower leg: No edema.      Left lower leg: No edema.   Skin:     General: Skin is warm and dry.      Capillary Refill: Capillary refill takes less than 2 seconds.      Coloration: Skin is not jaundiced or pale.      Findings: No erythema.   Neurological:      General: No focal deficit present.      Mental Status: He is alert. Mental status is at baseline. He is disoriented.      Cranial Nerves: Cranial nerves 2-12 are intact. No cranial nerve deficit.      Sensory: Sensation is intact. No sensory deficit.      Motor: Motor function is intact. No weakness.   Psychiatric:         Mood and Affect: Mood normal.         Behavior: Behavior normal.         Cognition and Memory: Cognition is impaired. Memory is impaired.                  Procedures:  Procedures      Medical Decision Making:      Comorbidities that affect care:    Dementia, hyperlipidemia    External Notes reviewed:    None      The following orders were placed and all results were independently analyzed by me:  Orders Placed This Encounter   Procedures    CT Chest Without Contrast Diagnostic    XR Wrist 2 View Left    Comprehensive Metabolic Panel    High Sensitivity Troponin T    CBC Auto Differential    BNP    High Sensitivity Troponin T 2Hr    Basic Metabolic Panel    Magnesium    Phosphorus    Hepatic Function Panel    CBC Auto Differential    TSH Rfx On Abnormal To Free T4    Basic Metabolic Panel    CBC Auto Differential    Diet: Cardiac; Low Sodium (2g); Fluid Consistency: Thin (IDDSI 0)    Continuous Pulse Oximetry    Vital Signs    Intake & Output    Weigh Patient    Oral Care     Place Sequential Compression Device    Maintain Sequential Compression Device    Remote Cardiac Monitor    Maintain IV Access    Strict Intake & Output    Daily Weights    Code Status and Medical Interventions: No CPR (Do Not Attempt to Resuscitate); Limited Support; No intubation (DNI)    Inpatient Hospitalist Consult    Inpatient Pulmonology Consult    Inpatient Cardiology Consult    PT Consult: Eval & Treat Discharge Placement Assessment, Functional Mobility Below Baseline    PT Plan of Care Cert / Re-Cert    PT Plan of Care Cert / Re-Cert    Incentive Spirometry    ECG 12 Lead Chest Pain    Adult Transthoracic Echo Complete W/ Cont if Necessary Per Protocol    Insert peripheral IV    Insert Peripheral IV    Initiate Observation Status    Inpatient Admission    Fall Precautions    CBC & Differential    Extra Tubes    Lavender Top    CBC & Differential       Medications Given in the Emergency Department:  Medications   sodium chloride 0.9 % flush 10 mL (has no administration in time range)   ketorolac (TORADOL) injection 15 mg (15 mg Intravenous Given 11/18/24 2226)   sodium chloride 0.9 % flush 10 mL (10 mL Intravenous Given 11/20/24 2018)   sodium chloride 0.9 % flush 10 mL (has no administration in time range)   sodium chloride 0.9 % infusion 40 mL (has no administration in time range)   sennosides-docusate (PERICOLACE) 8.6-50 MG per tablet 2 tablet (2 tablets Oral Given 11/18/24 1041)     And   polyethylene glycol (MIRALAX) packet 17 g (has no administration in time range)     And   bisacodyl (DULCOLAX) EC tablet 5 mg (has no administration in time range)     And   bisacodyl (DULCOLAX) suppository 10 mg (has no administration in time range)   HYDROcodone-acetaminophen (NORCO)  MG per tablet 1 tablet (1 tablet Oral Not Given 11/20/24 1058)   morphine injection 2 mg (2 mg Intravenous Given 11/19/24 0402)   donepezil (ARICEPT) tablet 10 mg (10 mg Oral Given 11/20/24 2017)   atorvastatin (LIPITOR) tablet 20  mg (20 mg Oral Given 11/20/24 2018)   melatonin tablet 5 mg (5 mg Oral Given 11/20/24 2018)   aluminum-magnesium hydroxide-simethicone (MAALOX MAX) 400-400-40 MG/5ML suspension 15 mL (has no administration in time range)   nicotine (NICODERM CQ) 21 MG/24HR patch 1 patch (has no administration in time range)   hydrOXYzine (ATARAX) tablet 25 mg (25 mg Oral Given 11/20/24 2018)   ondansetron (ZOFRAN) injection 4 mg (has no administration in time range)   acetaminophen (TYLENOL) tablet 650 mg (has no administration in time range)   Diclofenac Sodium (VOLTAREN) 1 % gel 2 g (2 g Topical Given 11/20/24 1018)   acetaminophen (TYLENOL) tablet 1,000 mg (1,000 mg Oral Given 11/20/24 2017)   HYDROcodone-acetaminophen (NORCO) 5-325 MG per tablet 1 tablet (1 tablet Oral Given 11/21/24 0008)   Lidocaine 4 % 2 patch (2 patches Transdermal Medication Removed 11/20/24 2024)   carvedilol (COREG) tablet 3.125 mg (3.125 mg Oral Given 11/20/24 1754)   sacubitril-valsartan (ENTRESTO) 24-26 MG tablet 1 tablet (1 tablet Oral Given 11/20/24 2017)   morphine injection 1 mg (1 mg Intravenous Given 11/18/24 0414)   Sulfur Hexafluoride Microsph (LUMASON) 60.7-25 MG IV reconstituted suspension reconstituted suspension 5 mL (5 mL Injection Given 11/18/24 1637)   furosemide (LASIX) injection 40 mg (40 mg Intravenous Given 11/19/24 1311)   potassium chloride (MICRO-K/KLOR-CON) CR capsule (40 mEq Oral Given 11/19/24 1311)        ED Course:    ED Course as of 11/21/24 0408   Mon Nov 18, 2024   0147 EKG:    Rhythm: Normal sinus rhythm  Rate: 90  Intervals: Normal [place] and QT interval  T-wave: No pathological T waves  ST Segment: No pathological ST elevation or reciprocal ST depression to suggest STEMI    EKG Comparison: No change in the QRS and ST morphology from the EKG performed October 17, 2024    Interpreted by me   [SD]      ED Course User Index  [SD] Tc Khan DO       Labs:    Lab Results (last 24 hours)       Procedure Component Value Units  Date/Time    Basic Metabolic Panel [[id number]]  (Abnormal) Collected: 11/20/24 0518    Specimen: Blood from Arm, Right Updated: 11/20/24 0601     Glucose 103 mg/dL      BUN 19 mg/dL      Creatinine 0.78 mg/dL      Sodium 128 mmol/L      Potassium 4.6 mmol/L      Chloride 94 mmol/L      CO2 27.2 mmol/L      Calcium 8.7 mg/dL      BUN/Creatinine Ratio 24.4     Anion Gap 6.8 mmol/L      eGFR 87.4 mL/min/1.73     Narrative:      GFR Normal >60  Chronic Kidney Disease <60  Kidney Failure <15    The GFR formula is only valid for adults with stable renal function between ages 18 and 70.    CBC & Differential [[id number]]  (Abnormal) Collected: 11/20/24 0518    Specimen: Blood from Arm, Right Updated: 11/20/24 0549    Narrative:      The following orders were created for panel order CBC & Differential.  Procedure                               Abnormality         Status                     ---------                               -----------         ------                     CBC Auto Differential[[id number]]        Abnormal            Final result                 Please view results for these tests on the individual orders.    Magnesium [[id number]]  (Normal) Collected: 11/20/24 0518    Specimen: Blood from Arm, Right Updated: 11/20/24 0601     Magnesium 2.0 mg/dL     Phosphorus [[id number]]  (Normal) Collected: 11/20/24 0518    Specimen: Blood from Arm, Right Updated: 11/20/24 0601     Phosphorus 3.1 mg/dL     CBC Auto Differential [[id number]]  (Abnormal) Collected: 11/20/24 0518    Specimen: Blood from Arm, Right Updated: 11/20/24 0549     WBC 8.53 10*3/mm3      RBC 3.90 10*6/mm3      Hemoglobin 11.1 g/dL      Hematocrit 34.1 %      MCV 87.4 fL      MCH 28.5 pg      MCHC 32.6 g/dL      RDW 16.3 %      RDW-SD 52.0 fl      MPV 10.3 fL      Platelets 273 10*3/mm3      Neutrophil % 71.9 %      Lymphocyte % 14.7 %      Monocyte % 11.0 %      Eosinophil % 1.8 %      Basophil % 0.2 %      Immature Grans % 0.4 %      Neutrophils,  Absolute 6.14 10*3/mm3      Lymphocytes, Absolute 1.25 10*3/mm3      Monocytes, Absolute 0.94 10*3/mm3      Eosinophils, Absolute 0.15 10*3/mm3      Basophils, Absolute 0.02 10*3/mm3      Immature Grans, Absolute 0.03 10*3/mm3      nRBC 0.0 /100 WBC              Imaging:    XR Wrist 2 View Left    Result Date: 11/20/2024  XR WRIST 2 VW LEFT Date of Exam: 11/20/2024 10:29 AM EST Indication: Fall with left wrist pain mild swelling. Numbness in fingers. Comparison: None available. Findings: No fracture or malalignment is identified. The bones appear diffusely osteopenic. 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 Atherosclerotic calcification is noted.     Impression: No acute fracture or malalignment Electronically Signed: Emerson Day MD  11/20/2024 12:00 PM EST  Workstation ID: [id number]       Differential Diagnosis and Discussion:    Chest Pain:  Based on the patient's signs and symptoms, I considered aortic dissection, myocardial infaction, pulmonary embolism, cardiac tamponade, pericarditis, pneumothorax, musculoskeletal chest pain and other differential diagnosis as an etiology of the patient's chest pain.     All labs were reviewed and interpreted by me.  EKG was interpreted by me.    MDM  Number of Diagnoses or Management Options  Multiple rib fractures involving four or more ribs  Pleural effusion  Diagnosis management comments: The patient's CBC was reviewed and shows no abnormalities of critical concern.  Of note, there is no anemia requiring a blood transfusion and the platelet count is acceptable    The patient's CMP was reviewed and shows no abnormalities of critical concern.  Of note, the patient's sodium and potassium are acceptable.  The patient's liver enzymes are unremarkable.  The patient's renal function including creatinine is preserved.  The patient has a normal anion gap.    The patient's EKG demonstrated a normal sinus rhythm.  The EKG demonstrated no evidence of  dysrhythmia.  The patient had no acute ST abnormalities or acute T wave abnormalities to indicate STEMI or other acute cardiac pathology, injury or ischemia    Patient's first high sensitive troponin was 27      CT scan of the chest demonstrates acute to subacute anterolateral rib fractures on the right second through 11 and rib fractures 3rd through 12th.  The patient has increasing bilateral effusions             Amount and/or Complexity of Data Reviewed  Clinical lab tests: reviewed  Tests in the radiology section of CPT®: reviewed  Tests in the medicine section of CPT®: reviewed  Discuss the patient with other providers: yes (02:46 EST  I discussed the case with the hospitalist.  We have discussed the patient's presenting symptoms, laboratory values, imaging and condition at the time of admission.  They will evaluate the patient in the emergency room and admit the patient to the hospital)             Social Determinants of Health:    Patient is a nursing home/assisted living resident and has reliable access to care.      Disposition and Care Coordination:    Admit:   Through independent evaluation of the patient's history, physical, and imperical data, the patient meets criteria for inpatient admission to the hospital.        Final diagnoses:   Multiple rib fractures involving four or more ribs   Pleural effusion        ED Disposition       ED Disposition   Decision to Admit    Condition   --    Comment   Level of Care: Remote Telemetry [26]   Diagnosis: Rib pain on left side [[id number]]                 This medical record created using voice recognition software.             Tc Khan DO  11/21/24 0404

## 2024-11-19 LAB
ALBUMIN SERPL-MCNC: 4 G/DL (ref 3.5–5.2)
ALP SERPL-CCNC: 79 U/L (ref 39–117)
ALT SERPL W P-5'-P-CCNC: 7 U/L (ref 1–41)
ANION GAP SERPL CALCULATED.3IONS-SCNC: 8.7 MMOL/L (ref 5–15)
AST SERPL-CCNC: 18 U/L (ref 1–40)
BASOPHILS # BLD AUTO: 0.05 10*3/MM3 (ref 0–0.2)
BASOPHILS NFR BLD AUTO: 0.6 % (ref 0–1.5)
BILIRUB CONJ SERPL-MCNC: 0.3 MG/DL (ref 0–0.3)
BILIRUB INDIRECT SERPL-MCNC: 0.5 MG/DL
BILIRUB SERPL-MCNC: 0.8 MG/DL (ref 0–1.2)
BUN SERPL-MCNC: 16 MG/DL (ref 8–23)
BUN/CREAT SERPL: 20 (ref 7–25)
CALCIUM SPEC-SCNC: 8.8 MG/DL (ref 8.6–10.5)
CHLORIDE SERPL-SCNC: 92 MMOL/L (ref 98–107)
CO2 SERPL-SCNC: 27.3 MMOL/L (ref 22–29)
CREAT SERPL-MCNC: 0.8 MG/DL (ref 0.76–1.27)
DEPRECATED RDW RBC AUTO: 50.9 FL (ref 37–54)
EGFRCR SERPLBLD CKD-EPI 2021: 86.7 ML/MIN/1.73
EOSINOPHIL # BLD AUTO: 0.16 10*3/MM3 (ref 0–0.4)
EOSINOPHIL NFR BLD AUTO: 1.9 % (ref 0.3–6.2)
ERYTHROCYTE [DISTWIDTH] IN BLOOD BY AUTOMATED COUNT: 15.9 % (ref 12.3–15.4)
GLUCOSE SERPL-MCNC: 93 MG/DL (ref 65–99)
HCT VFR BLD AUTO: 37.9 % (ref 37.5–51)
HGB BLD-MCNC: 12.4 G/DL (ref 13–17.7)
IMM GRANULOCYTES # BLD AUTO: 0.04 10*3/MM3 (ref 0–0.05)
IMM GRANULOCYTES NFR BLD AUTO: 0.5 % (ref 0–0.5)
LYMPHOCYTES # BLD AUTO: 1.44 10*3/MM3 (ref 0.7–3.1)
LYMPHOCYTES NFR BLD AUTO: 17.5 % (ref 19.6–45.3)
MAGNESIUM SERPL-MCNC: 2.1 MG/DL (ref 1.6–2.4)
MCH RBC QN AUTO: 28.9 PG (ref 26.6–33)
MCHC RBC AUTO-ENTMCNC: 32.7 G/DL (ref 31.5–35.7)
MCV RBC AUTO: 88.3 FL (ref 79–97)
MONOCYTES # BLD AUTO: 0.96 10*3/MM3 (ref 0.1–0.9)
MONOCYTES NFR BLD AUTO: 11.6 % (ref 5–12)
NEUTROPHILS NFR BLD AUTO: 5.6 10*3/MM3 (ref 1.7–7)
NEUTROPHILS NFR BLD AUTO: 67.9 % (ref 42.7–76)
NRBC BLD AUTO-RTO: 0 /100 WBC (ref 0–0.2)
PHOSPHATE SERPL-MCNC: 3.7 MG/DL (ref 2.5–4.5)
PLATELET # BLD AUTO: 280 10*3/MM3 (ref 140–450)
PMV BLD AUTO: 10 FL (ref 6–12)
POTASSIUM SERPL-SCNC: 4.2 MMOL/L (ref 3.5–5.2)
PROT SERPL-MCNC: 7 G/DL (ref 6–8.5)
RBC # BLD AUTO: 4.29 10*6/MM3 (ref 4.14–5.8)
SODIUM SERPL-SCNC: 128 MMOL/L (ref 136–145)
TSH SERPL DL<=0.05 MIU/L-ACNC: 3.14 UIU/ML (ref 0.27–4.2)
WBC NRBC COR # BLD AUTO: 8.25 10*3/MM3 (ref 3.4–10.8)

## 2024-11-19 PROCEDURE — 80076 HEPATIC FUNCTION PANEL: CPT | Performed by: FAMILY MEDICINE

## 2024-11-19 PROCEDURE — 80048 BASIC METABOLIC PNL TOTAL CA: CPT | Performed by: FAMILY MEDICINE

## 2024-11-19 PROCEDURE — 83735 ASSAY OF MAGNESIUM: CPT | Performed by: FAMILY MEDICINE

## 2024-11-19 PROCEDURE — 84100 ASSAY OF PHOSPHORUS: CPT | Performed by: FAMILY MEDICINE

## 2024-11-19 PROCEDURE — 99233 SBSQ HOSP IP/OBS HIGH 50: CPT | Performed by: INTERNAL MEDICINE

## 2024-11-19 PROCEDURE — 85025 COMPLETE CBC W/AUTO DIFF WBC: CPT | Performed by: FAMILY MEDICINE

## 2024-11-19 PROCEDURE — 25010000002 FUROSEMIDE PER 20 MG: Performed by: INTERNAL MEDICINE

## 2024-11-19 PROCEDURE — 84443 ASSAY THYROID STIM HORMONE: CPT | Performed by: INTERNAL MEDICINE

## 2024-11-19 PROCEDURE — 25010000002 MORPHINE PER 10 MG: Performed by: FAMILY MEDICINE

## 2024-11-19 RX ORDER — LIDOCAINE 4 G/G
1 PATCH TOPICAL DAILY PRN
Status: DISCONTINUED | OUTPATIENT
Start: 2024-11-19 | End: 2024-11-19

## 2024-11-19 RX ORDER — ONDANSETRON 2 MG/ML
4 INJECTION INTRAMUSCULAR; INTRAVENOUS EVERY 4 HOURS PRN
Status: DISCONTINUED | OUTPATIENT
Start: 2024-11-19 | End: 2024-11-25 | Stop reason: HOSPADM

## 2024-11-19 RX ORDER — LIDOCAINE 4 G/G
2 PATCH TOPICAL DAILY
Status: DISCONTINUED | OUTPATIENT
Start: 2024-11-20 | End: 2024-11-25 | Stop reason: HOSPADM

## 2024-11-19 RX ORDER — ACETAMINOPHEN 500 MG
1000 TABLET ORAL 3 TIMES DAILY
Status: DISPENSED | OUTPATIENT
Start: 2024-11-19 | End: 2024-11-24

## 2024-11-19 RX ORDER — NICOTINE 21 MG/24HR
1 PATCH, TRANSDERMAL 24 HOURS TRANSDERMAL DAILY PRN
Status: DISCONTINUED | OUTPATIENT
Start: 2024-11-19 | End: 2024-11-25 | Stop reason: HOSPADM

## 2024-11-19 RX ORDER — ALUMINA, MAGNESIA, AND SIMETHICONE 2400; 2400; 240 MG/30ML; MG/30ML; MG/30ML
15 SUSPENSION ORAL EVERY 6 HOURS PRN
Status: DISCONTINUED | OUTPATIENT
Start: 2024-11-19 | End: 2024-11-25 | Stop reason: HOSPADM

## 2024-11-19 RX ORDER — HYDROXYZINE HYDROCHLORIDE 25 MG/1
25 TABLET, FILM COATED ORAL 3 TIMES DAILY PRN
Status: DISCONTINUED | OUTPATIENT
Start: 2024-11-19 | End: 2024-11-25 | Stop reason: HOSPADM

## 2024-11-19 RX ORDER — POTASSIUM CHLORIDE 750 MG/1
40 CAPSULE, EXTENDED RELEASE ORAL ONCE
Status: COMPLETED | OUTPATIENT
Start: 2024-11-19 | End: 2024-11-19

## 2024-11-19 RX ORDER — FUROSEMIDE 10 MG/ML
40 INJECTION INTRAMUSCULAR; INTRAVENOUS ONCE
Status: COMPLETED | OUTPATIENT
Start: 2024-11-19 | End: 2024-11-19

## 2024-11-19 RX ORDER — HYDROCODONE BITARTRATE AND ACETAMINOPHEN 5; 325 MG/1; MG/1
1 TABLET ORAL EVERY 4 HOURS PRN
Status: DISCONTINUED | OUTPATIENT
Start: 2024-11-19 | End: 2024-11-24

## 2024-11-19 RX ORDER — ACETAMINOPHEN 325 MG/1
650 TABLET ORAL EVERY 6 HOURS PRN
Status: DISCONTINUED | OUTPATIENT
Start: 2024-11-19 | End: 2024-11-25 | Stop reason: HOSPADM

## 2024-11-19 RX ADMIN — Medication 10 ML: at 20:01

## 2024-11-19 RX ADMIN — DONEPEZIL HYDROCHLORIDE 10 MG: 10 TABLET, FILM COATED ORAL at 20:01

## 2024-11-19 RX ADMIN — Medication 5 MG: at 20:01

## 2024-11-19 RX ADMIN — MORPHINE SULFATE 2 MG: 2 INJECTION, SOLUTION INTRAMUSCULAR; INTRAVENOUS at 04:02

## 2024-11-19 RX ADMIN — ATORVASTATIN CALCIUM 20 MG: 20 TABLET, FILM COATED ORAL at 20:01

## 2024-11-19 RX ADMIN — POTASSIUM CHLORIDE 40 MEQ: 750 CAPSULE, EXTENDED RELEASE ORAL at 13:11

## 2024-11-19 RX ADMIN — HYDROCODONE BITARTRATE AND ACETAMINOPHEN 1 TABLET: 10; 325 TABLET ORAL at 08:26

## 2024-11-19 RX ADMIN — HYDROXYZINE HYDROCHLORIDE 25 MG: 25 TABLET, FILM COATED ORAL at 20:01

## 2024-11-19 RX ADMIN — ACETAMINOPHEN 1000 MG: 500 TABLET ORAL at 20:01

## 2024-11-19 RX ADMIN — FUROSEMIDE 40 MG: 10 INJECTION, SOLUTION INTRAMUSCULAR; INTRAVENOUS at 13:11

## 2024-11-19 NOTE — PLAN OF CARE
Goal Outcome Evaluation:  Plan of Care Reviewed With: patient, family (Daughter)        Progress: no change   Alert to person, confusion to time, place, and situation due to dementia history, reorientation to place, time and situation. Consistent schedule with minimal awakenings to allow sleep and rest. Pain controlled with medication per order, tolerating well. Appears to be sleeping without discomfort.

## 2024-11-19 NOTE — PROGRESS NOTES
[facility]   Hospitalist Progress Note    Date of admission: 11/17/2024  Patient Name: Arnaldo Benoit  1940  Date: 11/19/2024      Subjective     Chief Complaint   Patient presents with    Rib Pain       Interval Followup: Has some pain in his ribs still with breathing, like his breathing is doing okay no acute shortness of air today.  Has confusion related to his dementia.  Denies any chest pain or pressure      Objective     Vitals:   Temp:  [97.2 °F (36.2 °C)-97.9 °F (36.6 °C)] 97.2 °F (36.2 °C)  Heart Rate:  [63-91] 81  Resp:  [16-20] 18  BP: ()/(61-95) 111/61    Physical Exam  Awake conversant, no acute distress, can answer basic questions okay does have some short-term memory issues and problems with higher-level health literacy issues  Slightly diminished at bases no acute wheezing, on room air currently  RRR no lower extremity pitting edema, systolic murmur  Abdo soft nontender nondistended      Result Review:  Vital signs, labs and recent relevant imaging reviewed.      CBC          11/15/2024    14:37 11/18/2024    01:40 11/19/2024    05:11   CBC   WBC 8.99  7.09  8.25    RBC 4.23  3.95  4.29    Hemoglobin 12.2  11.4  12.4    Hematocrit 37.2  34.2  37.9    MCV 87.9  86.6  88.3    MCH 28.8  28.9  28.9    MCHC 32.8  33.3  32.7    RDW 16.0  16.2  15.9    Platelets 279  244  280      CMP          11/15/2024    14:37 11/18/2024    01:40 11/19/2024    05:11   CMP   Glucose 99  94  93    BUN 13  11  16    Creatinine 0.86  0.74  0.80    EGFR 84.9  88.8  86.7    Sodium 128  130  128    Potassium 4.4  4.4  4.2    Chloride 95  95  92    Calcium 9.0  9.1  8.8    Total Protein 7.2  6.4  7.0    Albumin 4.2  3.7  4.0    Globulin 3.0  2.7     Total Bilirubin 0.5  0.5  0.8    Alkaline Phosphatase 81  75  79    AST (SGOT) 20  19  18    ALT (SGPT) 11  7  7    Albumin/Globulin Ratio 1.4  1.4     BUN/Creatinine Ratio 15.1  14.9  20.0    Anion Gap 8.8  10.7  8.7          acetaminophen    aluminum-magnesium  hydroxide-simethicone    senna-docusate sodium **AND** polyethylene glycol **AND** bisacodyl **AND** bisacodyl    Diclofenac Sodium    HYDROcodone-acetaminophen    HYDROcodone-acetaminophen    hydrOXYzine    Lidocaine    melatonin    Morphine    nicotine    ondansetron    [COMPLETED] Insert peripheral IV **AND** sodium chloride    sodium chloride    sodium chloride    atorvastatin, 20 mg, Oral, Nightly  donepezil, 10 mg, Oral, Nightly  Lidocaine, 2 patch, Transdermal, Q24H  Lidocaine, 2 patch, Transdermal, Q24H  sodium chloride, 10 mL, Intravenous, Q12H        CT Chest Without Contrast Diagnostic    Result Date: 11/18/2024  Interval slight increase in the bilateral pleural effusions. No pneumothorax is seen. Multiple bilateral rib fractures are present, as described above and previously. Otherwise, no significant interval change is seen since 11/15/2024. Please see above comments for further detail.   Portions of this note were completed with a voice recognition program.  Electronically Signed By-Alexis Hernandez MD On:11/18/2024 1:15 AM      CT Abdomen Pelvis With Contrast    Result Date: 11/15/2024  Impression: 1. Bilateral rib fractures primarily involving the right ribs along the anterior costochondral junction and involving the lateral left ribs. 2. Mild to moderate right pleural effusion. Pleural and diaphragmatic calcifications are noted suggestive of prior asbestosis exposure. There is some suspected scarring and atelectasis at the right lung base. 3. Abnormal enhancement along the rectal wall concerning for active rectal bleeding. Correlate clinically. Electronically Signed: Keshia Awan MD  11/15/2024 4:20 PM EST  Workstation ID: [id number]    CT Chest Without Contrast Diagnostic    Result Date: 11/15/2024  Impression: 1. Bilateral rib fractures primarily involving the right ribs along the anterior costochondral junction and involving the lateral left ribs. 2. Mild to moderate right pleural effusion.  Pleural and diaphragmatic calcifications are noted suggestive of prior asbestosis exposure. There is some suspected scarring and atelectasis at the right lung base. 3. Abnormal enhancement along the rectal wall concerning for active rectal bleeding. Correlate clinically. Electronically Signed: Keshia Awan MD  11/15/2024 4:20 PM EST  Workstation ID: [id number]     Assessment / Plan     Summary: 84 y.o. with dementia who had a mechanical fall with subsequent multiple rib fractures, moderate bilateral pleural effusions, pulmonology consulted for evaluation.  Echo resulting with systolic CHF new along with moderate-severe MVR, cardiology consulted.  Patient baseline is at a assisted living facility ultimately plans to return there at discharge    Assessment/Plan (clinically significant if listed here)  Follow-up multiple bilateral rib fractures (bilateral anterior, anterolateral acute-to-subacute rib   fractures (right: 2nd through 11th; left: 3rd through 12th)   Question new systolic CHF with moderate to severe MVR  Mild hyponatremia question setting of volume overload with CHF  Small bilateral effusions question secondary to CHF and/or fall with rib fracture  Dementia moderate  History of hypertension  Hyperlipidemia    Echo resulting with reduced EF at 33% along with moderate to severe MR, consult cardiology for evaluation  Give IV Lasix monitor I's and O's renal function, question of new systolic CHF unable to see prior echo readings unclear if was done previously, has seen Dr. Larios previously for other issues have asked to reevaluate further appreciate assistance  iv lasix, not on diuretics outpatient, strict i/o, renal fxn and weight monitoring  Bp limiting additional GDMT currently, has been variable, reassess in am for further changes   check tsh, sodium low at 128, question limitedj po component given dementia vs volume overload  cont donepezil  Scheduled as needed pain medications for rib fractures,  and/primary respiratory hygiene, will place lidocaine patch as well to try and help with symptoms   D/w pulm - no thoracentesis needed, appreciate assistance   PT/OT  Check a.m. CBC, BMP, magnesium, phosphorus  Continue hospitalization at current level of care    Dispo: back to AL facility once medically stable.   D/w SW    VTE Prophylaxis:  Mechanical VTE prophylaxis orders are present.      Medical Intervention Limits: No intubation (DNI)  Level Of Support Discussed With: Health Care Surrogate  Code Status (Patient has no pulse and is not breathing): No CPR (Do Not Attempt to Resuscitate)  Medical Interventions (Patient has pulse or is breathing): Limited Support    Greater than 50 minutes on this encounter including review of labs, imaging, documentation, orders, vitals, monitoring and adjusting treatment and orders as indicated, discussion with the patient cards, pulm, sw, rn, and documenting.

## 2024-11-19 NOTE — PLAN OF CARE
Goal Outcome Evaluation:   More alert this shift. Remains impulsive. Bed alarm active. Will continue current plan of care.

## 2024-11-20 ENCOUNTER — APPOINTMENT (OUTPATIENT)
Dept: GENERAL RADIOLOGY | Facility: HOSPITAL | Age: 84
DRG: 184 | End: 2024-11-20
Payer: MEDICARE

## 2024-11-20 PROBLEM — I34.0 NONRHEUMATIC MITRAL VALVE REGURGITATION: Status: ACTIVE | Noted: 2024-11-20

## 2024-11-20 PROBLEM — I51.9 LV DYSFUNCTION: Status: ACTIVE | Noted: 2024-11-20

## 2024-11-20 LAB
ANION GAP SERPL CALCULATED.3IONS-SCNC: 6.8 MMOL/L (ref 5–15)
BASOPHILS # BLD AUTO: 0.02 10*3/MM3 (ref 0–0.2)
BASOPHILS NFR BLD AUTO: 0.2 % (ref 0–1.5)
BUN SERPL-MCNC: 19 MG/DL (ref 8–23)
BUN/CREAT SERPL: 24.4 (ref 7–25)
CALCIUM SPEC-SCNC: 8.7 MG/DL (ref 8.6–10.5)
CHLORIDE SERPL-SCNC: 94 MMOL/L (ref 98–107)
CO2 SERPL-SCNC: 27.2 MMOL/L (ref 22–29)
CREAT SERPL-MCNC: 0.78 MG/DL (ref 0.76–1.27)
DEPRECATED RDW RBC AUTO: 52 FL (ref 37–54)
EGFRCR SERPLBLD CKD-EPI 2021: 87.4 ML/MIN/1.73
EOSINOPHIL # BLD AUTO: 0.15 10*3/MM3 (ref 0–0.4)
EOSINOPHIL NFR BLD AUTO: 1.8 % (ref 0.3–6.2)
ERYTHROCYTE [DISTWIDTH] IN BLOOD BY AUTOMATED COUNT: 16.3 % (ref 12.3–15.4)
GLUCOSE SERPL-MCNC: 103 MG/DL (ref 65–99)
HCT VFR BLD AUTO: 34.1 % (ref 37.5–51)
HGB BLD-MCNC: 11.1 G/DL (ref 13–17.7)
IMM GRANULOCYTES # BLD AUTO: 0.03 10*3/MM3 (ref 0–0.05)
IMM GRANULOCYTES NFR BLD AUTO: 0.4 % (ref 0–0.5)
LYMPHOCYTES # BLD AUTO: 1.25 10*3/MM3 (ref 0.7–3.1)
LYMPHOCYTES NFR BLD AUTO: 14.7 % (ref 19.6–45.3)
MAGNESIUM SERPL-MCNC: 2 MG/DL (ref 1.6–2.4)
MCH RBC QN AUTO: 28.5 PG (ref 26.6–33)
MCHC RBC AUTO-ENTMCNC: 32.6 G/DL (ref 31.5–35.7)
MCV RBC AUTO: 87.4 FL (ref 79–97)
MONOCYTES # BLD AUTO: 0.94 10*3/MM3 (ref 0.1–0.9)
MONOCYTES NFR BLD AUTO: 11 % (ref 5–12)
NEUTROPHILS NFR BLD AUTO: 6.14 10*3/MM3 (ref 1.7–7)
NEUTROPHILS NFR BLD AUTO: 71.9 % (ref 42.7–76)
NRBC BLD AUTO-RTO: 0 /100 WBC (ref 0–0.2)
PHOSPHATE SERPL-MCNC: 3.1 MG/DL (ref 2.5–4.5)
PLATELET # BLD AUTO: 273 10*3/MM3 (ref 140–450)
PMV BLD AUTO: 10.3 FL (ref 6–12)
POTASSIUM SERPL-SCNC: 4.6 MMOL/L (ref 3.5–5.2)
RBC # BLD AUTO: 3.9 10*6/MM3 (ref 4.14–5.8)
SODIUM SERPL-SCNC: 128 MMOL/L (ref 136–145)
WBC NRBC COR # BLD AUTO: 8.53 10*3/MM3 (ref 3.4–10.8)

## 2024-11-20 PROCEDURE — 99222 1ST HOSP IP/OBS MODERATE 55: CPT | Performed by: INTERNAL MEDICINE

## 2024-11-20 PROCEDURE — 99232 SBSQ HOSP IP/OBS MODERATE 35: CPT | Performed by: INTERNAL MEDICINE

## 2024-11-20 PROCEDURE — 85025 COMPLETE CBC W/AUTO DIFF WBC: CPT | Performed by: INTERNAL MEDICINE

## 2024-11-20 PROCEDURE — 84100 ASSAY OF PHOSPHORUS: CPT | Performed by: INTERNAL MEDICINE

## 2024-11-20 PROCEDURE — 97530 THERAPEUTIC ACTIVITIES: CPT

## 2024-11-20 PROCEDURE — 83735 ASSAY OF MAGNESIUM: CPT | Performed by: INTERNAL MEDICINE

## 2024-11-20 PROCEDURE — 97110 THERAPEUTIC EXERCISES: CPT

## 2024-11-20 PROCEDURE — 73100 X-RAY EXAM OF WRIST: CPT

## 2024-11-20 PROCEDURE — 80048 BASIC METABOLIC PNL TOTAL CA: CPT | Performed by: INTERNAL MEDICINE

## 2024-11-20 RX ORDER — CARVEDILOL 3.12 MG/1
3.12 TABLET ORAL 2 TIMES DAILY WITH MEALS
Status: DISCONTINUED | OUTPATIENT
Start: 2024-11-20 | End: 2024-11-25 | Stop reason: HOSPADM

## 2024-11-20 RX ADMIN — Medication 5 MG: at 20:18

## 2024-11-20 RX ADMIN — Medication 10 ML: at 08:37

## 2024-11-20 RX ADMIN — DICLOFENAC SODIUM 2 G: 10 GEL TOPICAL at 10:18

## 2024-11-20 RX ADMIN — CARVEDILOL 3.12 MG: 3.12 TABLET, FILM COATED ORAL at 17:54

## 2024-11-20 RX ADMIN — HYDROXYZINE HYDROCHLORIDE 25 MG: 25 TABLET, FILM COATED ORAL at 20:18

## 2024-11-20 RX ADMIN — HYDROCODONE BITARTRATE AND ACETAMINOPHEN 1 TABLET: 5; 325 TABLET ORAL at 10:57

## 2024-11-20 RX ADMIN — CARVEDILOL 3.12 MG: 3.12 TABLET, FILM COATED ORAL at 10:17

## 2024-11-20 RX ADMIN — HYDROCODONE BITARTRATE AND ACETAMINOPHEN 1 TABLET: 5; 325 TABLET ORAL at 16:44

## 2024-11-20 RX ADMIN — Medication 10 ML: at 20:18

## 2024-11-20 RX ADMIN — SACUBITRIL AND VALSARTAN 1 TABLET: 24; 26 TABLET, FILM COATED ORAL at 20:17

## 2024-11-20 RX ADMIN — ATORVASTATIN CALCIUM 20 MG: 20 TABLET, FILM COATED ORAL at 20:18

## 2024-11-20 RX ADMIN — HYDROCODONE BITARTRATE AND ACETAMINOPHEN 1 TABLET: 5; 325 TABLET ORAL at 06:27

## 2024-11-20 RX ADMIN — ACETAMINOPHEN 1000 MG: 500 TABLET ORAL at 20:17

## 2024-11-20 RX ADMIN — SACUBITRIL AND VALSARTAN 1 TABLET: 24; 26 TABLET, FILM COATED ORAL at 10:16

## 2024-11-20 RX ADMIN — LIDOCAINE 2 PATCH: 4 PATCH TOPICAL at 08:33

## 2024-11-20 RX ADMIN — DONEPEZIL HYDROCHLORIDE 10 MG: 10 TABLET, FILM COATED ORAL at 20:17

## 2024-11-20 RX ADMIN — ACETAMINOPHEN 1000 MG: 500 TABLET ORAL at 08:33

## 2024-11-20 NOTE — THERAPY TREATMENT NOTE
Acute Care - Physical Therapy Treatment Note   [facility]     Patient Name: Arnaldo Benoit  : 1940  MRN: [id number]  Today's Date: 2024      Visit Dx:     ICD-10-CM ICD-9-CM   1. Multiple rib fractures involving four or more ribs  S22.49XA 807.09   2. Pleural effusion  J90 511.9   3. Difficulty walking  R26.2 719.7     Patient Active Problem List   Diagnosis    Skin cancer of scalp    Rib pain on left side    LV dysfunction    Nonrheumatic mitral valve regurgitation     Past Medical History:   Diagnosis Date    Dementia     early    Hyperlipidemia      History reviewed. No pertinent surgical history.  PT Assessment (Last 12 Hours)       PT Evaluation and Treatment       Row Name 24          Physical Therapy Time and Intention    Subjective Information complains of;weakness;fatigue;numbness  -DK     Document Type therapy note (daily note)  -DK     Mode of Treatment individual therapy;physical therapy  -DK     Patient Effort good  -DK     Symptoms Noted During/After Treatment fatigue  -DK     Comment Pt reports having a bad night, but that he slept well.  He is currently c/o numbness in the left hand.  Pt does appear confused and lethargic.  He is somewhat [place], requiring repeated questions / cues.  -DK       Row Name 24          Pain    Pretreatment Pain Rating 0/10 - no pain  -DK     Posttreatment Pain Rating 0/10 - no pain  -DK       Row Name 24          Cognition    Affect/Mental Status (Cognition) confused;flat/blunted affect;low arousal/lethargic  -DK     Behavioral Issues (Cognition) overwhelmed easily;difficulty managing stress  -DK     Orientation Status (Cognition) oriented to;person;situation  -DK     Follows Commands (Cognition) physical/tactile prompts required;repetition of directions required;verbal cues/prompting required  -DK     Cognitive Function (Cognition) attention deficit  -DK     Attention Deficit (Cognition)  arousal/alertness;concentration;focused/sustained attention  -DK     Personal Safety Interventions gait belt;nonskid shoes/slippers when out of bed;supervised activity  -DK       Row Name 11/20/24 0904          Motor Skills    Motor Skills --  therapeutic exercises  -DK     Therapeutic Exercise hip;knee;ankle  -DK     Additional Documentation --  Pt declined all transfers and gait.  -       Row Name 11/20/24 0904          Hip (Therapeutic Exercise)    Hip (Therapeutic Exercise) AROM (active range of motion)  -DK     Hip AROM (Therapeutic Exercise) bilateral;flexion;extension;aBduction;aDduction;supine;15 repititions  -DK     Hip AAROM (Therapeutic Exercise) --  -       Row Name 11/20/24 0904          Knee (Therapeutic Exercise)    Knee (Therapeutic Exercise) AROM (active range of motion)  -DK     Knee AROM (Therapeutic Exercise) bilateral;flexion;extension;SLR (straight leg raise);heel slides;supine;15 repititions  -     Knee AAROM (Therapeutic Exercise) --  -       Row Name 11/20/24 0904          Ankle (Therapeutic Exercise)    Ankle (Therapeutic Exercise) AROM (active range of motion)  -DK     Ankle AROM (Therapeutic Exercise) bilateral;dorsiflexion;plantarflexion;supine;20 repititions  -     Ankle AAROM (Therapeutic Exercise) --  -       Row Name             Wound 11/18/24 0435 Right upper arm    Wound - Properties Group Placement Date: 11/18/24  -JS Placement Time: 0435  -JS Side: Right  -JS Orientation: upper  -JS Location: arm  -JS    Retired Wound - Properties Group Placement Date: 11/18/24  -JS Placement Time: 0435  -JS Side: Right  -JS Orientation: upper  -JS Location: arm  -JS    Retired Wound - Properties Group Placement Date: 11/18/24  -JS Placement Time: 0435  -JS Side: Right  -JS Orientation: upper  -JS Location: arm  -JS    Retired Wound - Properties Group Date first assessed: 11/18/24  -JS Time first assessed: 0435  -JS Side: Right  -JS Location: arm  -JS      Row Name 11/20/24 0904           Plan of Care Review    Plan of Care Reviewed With patient  -DK     Progress no change  -DK       Row Name 11/20/24 0904          Positioning and Restraints    Pre-Treatment Position in bed  -DK     Post Treatment Position bed  -DK     In Bed supine;call light within reach;encouraged to call for assist;exit alarm on;side rails up x2;legs elevated  -DK       Row Name 11/20/24 0904          Therapy Assessment/Plan (PT)    Rehab Potential (PT) good  -DK     Criteria for Skilled Interventions Met (PT) skilled treatment is necessary  -DK     Therapy Frequency (PT) daily  -DK     Problem List (PT) problems related to;balance;cognition;mobility;strength;hearing;communication  -DK     Activity Limitations Related to Problem List (PT) unable to ambulate safely;unable to transfer safely  -DK       Row Name 11/20/24 0904          Progress Summary (PT)    Progress Toward Functional Goals (PT) progress toward functional goals is good  -DK               User Key  (r) = Recorded By, (t) = Taken By, (c) = Cosigned By      Initials Name Provider Type    Dilma Rao PTA Physical Therapist Assistant    Cesilia Mckeon RN Registered Nurse                    Physical Therapy Education        No education to display                  PT Recommendation and Plan  Planned Therapy Interventions (PT): balance training, bed mobility training, gait training, strengthening, transfer training  Therapy Frequency (PT): daily  Progress Summary (PT)  Progress Toward Functional Goals (PT): progress toward functional goals is good  Plan of Care Reviewed With: patient  Progress: no change   Outcome Measures       Row Name 11/20/24 0904 11/18/24 1300          How much help from another person do you currently need...    Turning from your back to your side while in flat bed without using bedrails? 4  -DK 4  -CS     Moving from lying on back to sitting on the side of a flat bed without bedrails? 4  -DK 4  -CS     Moving to and from a bed to a  chair (including a wheelchair)? 3  -DK 3  -CS     Standing up from a chair using your arms (e.g., wheelchair, bedside chair)? 3  -DK 3  -CS     Climbing 3-5 steps with a railing? 2  -DK 2  -CS     To walk in hospital room? 3  -DK 3  -CS     AM-PAC 6 Clicks Score (PT) 19  -DK 19  -CS        Functional Assessment    Outcome Measure Options AM-PAC 6 Clicks Basic Mobility (PT)  -DK AM-PAC 6 Clicks Basic Mobility (PT)  -CS               User Key  (r) = Recorded By, (t) = Taken By, (c) = Cosigned By      Initials Name Provider Type    Dilma Rao PTA Physical Therapist Assistant    CS Sri Castellanos PT Physical Therapist                     Time Calculation:    PT Charges       Row Name 11/20/24 0908             Time Calculation    PT Received On 11/20/24  -DK      PT Goal Re-Cert Due Date 11/27/24  -DK         Timed Charges    18735 - PT Therapeutic Exercise Minutes 16  -DK      16919 - PT Therapeutic Activity Minutes 12  -DK         Total Minutes    Timed Charges Total Minutes 28  -DK       Total Minutes 28  -DK                User Key  (r) = Recorded By, (t) = Taken By, (c) = Cosigned By      Initials Name Provider Type    Dilma Rao PTA Physical Therapist Assistant                  Therapy Charges for Today       Code Description Service Date Service Provider Modifiers Qty    45500186747 HC PT THER PROC EA 15 MIN 11/20/2024 Dilma Velazquez PTA GP 1    57617006782 HC PT THERAPEUTIC ACT EA 15 MIN 11/20/2024 Dilma Velazquez PTA GP 1            PT G-Codes  Outcome Measure Options: AM-PAC 6 Clicks Basic Mobility (PT)  AM-PAC 6 Clicks Score (PT): 19    Dilma Velazquez PTA  11/20/2024

## 2024-11-20 NOTE — PROGRESS NOTES
[facility]   Hospitalist Progress Note    Date of admission: 11/17/2024  Patient Name: Arnaldo Benoit  1940  Date: 11/20/2024      Subjective     Chief Complaint   Patient presents with    Rib Pain       Interval Followup: Notes pain in his left wrist today states it actually probably been there for about 3 days with some numbness in his fingers.  No worse happened after the fall.  Still has limitation with giving history given his dementia.     No chest pain or other acute complaints    Objective     Vitals:   Temp:  [97.2 °F (36.2 °C)-98 °F (36.7 °C)] 97.6 °F (36.4 °C)  Heart Rate:  [62-84] 70  Resp:  [16-20] 20  BP: (111-148)/(61-91) 117/65    Physical Exam  Awake conversant, no acute distress, answers basic questions okay somewhat inconsistent at times has short-term memory problems and will be asked questions to use as before, consistent with his dementia  CTAB no wheezing on room air  RRR no lower extremity pitting edema, systolic murmur  Abdo soft nontender nondistended  Left wrist tender to palpation no significant swelling noted has some pain with passive range of motion,  strength somewhat limited given pain complaints    Result Review:  Vital signs, labs and recent relevant imaging reviewed.      CBC          11/18/2024    01:40 11/19/2024    05:11 11/20/2024    05:18   CBC   WBC 7.09  8.25  8.53    RBC 3.95  4.29  3.90    Hemoglobin 11.4  12.4  11.1    Hematocrit 34.2  37.9  34.1    MCV 86.6  88.3  87.4    MCH 28.9  28.9  28.5    MCHC 33.3  32.7  32.6    RDW 16.2  15.9  16.3    Platelets 244  280  273      CMP          11/18/2024    01:40 11/19/2024    05:11 11/20/2024    05:18   CMP   Glucose 94  93  103    BUN 11  16  19    Creatinine 0.74  0.80  0.78    EGFR 88.8  86.7  87.4    Sodium 130  128  128    Potassium 4.4  4.2  4.6    Chloride 95  92  94    Calcium 9.1  8.8  8.7    Total Protein 6.4  7.0     Albumin 3.7  4.0     Globulin 2.7      Total Bilirubin 0.5  0.8     Alkaline  Phosphatase 75  79     AST (SGOT) 19  18     ALT (SGPT) 7  7     Albumin/Globulin Ratio 1.4      BUN/Creatinine Ratio 14.9  20.0  24.4    Anion Gap 10.7  8.7  6.8          acetaminophen    aluminum-magnesium hydroxide-simethicone    senna-docusate sodium **AND** polyethylene glycol **AND** bisacodyl **AND** bisacodyl    Diclofenac Sodium    HYDROcodone-acetaminophen    HYDROcodone-acetaminophen    hydrOXYzine    melatonin    Morphine    nicotine    ondansetron    [COMPLETED] Insert peripheral IV **AND** sodium chloride    sodium chloride    sodium chloride    acetaminophen, 1,000 mg, Oral, TID  atorvastatin, 20 mg, Oral, Nightly  carvedilol, 3.125 mg, Oral, BID With Meals  donepezil, 10 mg, Oral, Nightly  Lidocaine, 2 patch, Transdermal, Daily  sacubitril-valsartan, 1 tablet, Oral, Q12H  sodium chloride, 10 mL, Intravenous, Q12H        XR Wrist 2 View Left    Result Date: 11/20/2024  Impression: No acute fracture or malalignment Electronically Signed: Emerson Day MD  11/20/2024 12:00 PM EST  Workstation ID: [id number]    CT Chest Without Contrast Diagnostic    Result Date: 11/18/2024  Interval slight increase in the bilateral pleural effusions. No pneumothorax is seen. Multiple bilateral rib fractures are present, as described above and previously. Otherwise, no significant interval change is seen since 11/15/2024. Please see above comments for further detail.   Portions of this note were completed with a voice recognition program.  Electronically Signed By-Alexis Hernandez MD On:11/18/2024 1:15 AM      CT Abdomen Pelvis With Contrast    Result Date: 11/15/2024  Impression: 1. Bilateral rib fractures primarily involving the right ribs along the anterior costochondral junction and involving the lateral left ribs. 2. Mild to moderate right pleural effusion. Pleural and diaphragmatic calcifications are noted suggestive of prior asbestosis exposure. There is some suspected scarring and atelectasis at the right lung base.  3. Abnormal enhancement along the rectal wall concerning for active rectal bleeding. Correlate clinically. Electronically Signed: Keshia Awan MD  11/15/2024 4:20 PM EST  Workstation ID: [id number]    CT Chest Without Contrast Diagnostic    Result Date: 11/15/2024  Impression: 1. Bilateral rib fractures primarily involving the right ribs along the anterior costochondral junction and involving the lateral left ribs. 2. Mild to moderate right pleural effusion. Pleural and diaphragmatic calcifications are noted suggestive of prior asbestosis exposure. There is some suspected scarring and atelectasis at the right lung base. 3. Abnormal enhancement along the rectal wall concerning for active rectal bleeding. Correlate clinically. Electronically Signed: Keshia Awan MD  11/15/2024 4:20 PM EST  Workstation ID: [id number]     Assessment / Plan     Summary: 84 y.o. with dementia who had a mechanical fall with subsequent multiple rib fractures, moderate bilateral pleural effusions, pulmonology consulted for evaluation.  Echo resulting with systolic CHF new along with moderate-severe MVR, cardiology consulted.  Patient baseline is at a assisted living facility ultimately plans to return there at discharge    Assessment/Plan (clinically significant if listed here)  Follow-up multiple bilateral rib fractures (bilateral anterior, anterolateral acute-to-subacute rib   fractures (right: 2nd through 11th; left: 3rd through 12th)   Question new systolic CHF with moderate to severe MVR  Mild hyponatremia question setting of volume overload with CHF  Small bilateral effusions question secondary to CHF and/or fall with rib fracture  Dementia moderate  History of hypertension  Hyperlipidemia    Left wrist x-ray ordered no acute fracture and noted, trying scheduled Voltaren and a wrist brace as needed for now to help with symptoms, discussed with nursing  Received Lasix yesterday had good urine output, no additional diuretics  recommended per cardiology, monitoring and managing conservatively with medications given patient's advanced age and dementia which is reasonable titrating meds further as able with blood pressure.  Monitor oxygenation given small pleural effusions may warrant a low-dose as needed   Same still moderately low, on her diet, TSH within normal limits, asymptomatic from this can monitor  Continue donepezil, delirium/dementia precautions  Scheduled as needed pain medications for rib fractures, and/primary respiratory hygiene, will place lidocaine patch as well to try and help with symptoms   D/w pulm - no thoracentesis needed, appreciate assistance   PT/OT  Was at assisted living facility but has been having falls and suspect needs additional care than what he is getting there.    Discussed with social work who spoke with family further who is in agreement for rehab referrals    Dispo: rehab when available  D/w SW    VTE Prophylaxis:  Mechanical VTE prophylaxis orders are present.      Medical Intervention Limits: No intubation (DNI)  Level Of Support Discussed With: Health Care Surrogate  Code Status (Patient has no pulse and is not breathing): No CPR (Do Not Attempt to Resuscitate)  Medical Interventions (Patient has pulse or is breathing): Limited Support

## 2024-11-20 NOTE — PLAN OF CARE
Goal Outcome Evaluation:  Plan of Care Reviewed With: patient        Progress: no change  Outcome Evaluation: pt alert to self and time only. vss on room air. pt c/o pain x1 this shift; medicated with scheduled tylenol. frequent reorientation and educated provided. no new issues/needs at this time.                              Patient is resting comfortably. Denies any new complaints. Waiting for bed assignment.

## 2024-11-20 NOTE — PLAN OF CARE
Goal Outcome Evaluation:  Plan of Care Reviewed With: patient        Progress: no change  Outcome Evaluation: Patient remains alert and oriented x4 with intermittent confusion. Medicated with PRN pain medication per MAR for left rib pain and left wrist pain. Patient is a standby assist to the BR with a walker. No new issues at this time.

## 2024-11-20 NOTE — CONSULTS
Cardiology Consult Note  [address]          Patient Identification:  Arnaldo Benoit      [id number]  84 y.o.        male  1940       Date of Consultation: 11/20/2024    Reason for Consultation: Reduced EF, mitral regurgitation    PCP: Celina Salgado APRN  Primary cardiologist: Ric    History of Present Illness:     84-year-old white male.  I saw him once in January 2024 for a questionable syncopal episode.  At that time it sounded orthostatic.  He was referred for ventricular bigeminy.  After that visit he was supposed to get an echo but I see no evidence that ever got completed.  He has baseline dementia and lives at assisted living.  He had a mechanical fall there sustaining rib fractures.  He was admitted after the fall.  He had pleural effusions on CT that were small and after that an echo was done which showed reduced EF with moderate to severe mitral regurgitation.  I have no previous baseline echo for comparison.  The patient's history is limited by baseline dementia although he denies shortness of breath or anginal chest pain.  He has chest soreness on the left side that is tender to palpation.  His EKG this admission shows sinus rhythm with no changes    Past History:  Past Medical History:   Diagnosis Date    Dementia     early    Hyperlipidemia      History reviewed. No pertinent surgical history.  Allergies   Allergen Reactions    Beef-Derived Products Unknown - Low Severity     Social History     Socioeconomic History    Marital status:    Tobacco Use    Smoking status: Former     Types: Cigarettes     Start date: 1975     Passive exposure: Past    Smokeless tobacco: Never   Vaping Use    Vaping status: Never Used   Substance and Sexual Activity    Alcohol use: Yes     Comment: patient and family states occasional rum drinker. place of living and EMS states patient drinks daily.    Drug use: Never    Sexual activity: Defer     History reviewed. No pertinent  "family history.  Medications:  Medications Prior to Admission   Medication Sig Dispense Refill Last Dose/Taking    atorvastatin (LIPITOR) 20 MG tablet Take 1 tablet by mouth Daily. 90 tablet 3     donepezil (Aricept) 10 MG tablet Take 1 tablet by mouth Every Night. 90 tablet 1     oxyCODONE-acetaminophen (PERCOCET) 5-325 MG per tablet Take 1 tablet by mouth Every 6 (Six) Hours As Needed for Severe Pain. 12 tablet 0      Current medications:  acetaminophen, 1,000 mg, Oral, TID  atorvastatin, 20 mg, Oral, Nightly  donepezil, 10 mg, Oral, Nightly  Lidocaine, 2 patch, Transdermal, Daily  sodium chloride, 10 mL, Intravenous, Q12H      Current IV drips:       Review of Systems   Constitutional: Negative.   HENT: Negative.     Eyes: Negative.    Cardiovascular:  Negative for dyspnea on exertion.   Respiratory: Negative.  Negative for shortness of breath.    Endocrine: Negative.    Hematologic/Lymphatic: Negative.    Skin: Negative.    Musculoskeletal: Negative.         Left chest wall pain   Gastrointestinal: Negative.    Genitourinary: Negative.    Neurological: Negative.    Psychiatric/Behavioral: Negative.  Positive for memory loss.          Physical exam:    /75 (BP Location: Left arm, Patient Position: Lying)   Pulse 74   Temp 97.2 °F (36.2 °C) (Oral)   Resp 20   Ht 177.8 cm (70\")   Wt 88.8 kg (195 lb 12.3 oz)   SpO2 98%   BMI 28.09 kg/m²  Body mass index is 28.09 kg/m².    SpO2  Min: 94 %  Max: 100 %    General Appearance:   well developed  well nourished  HENT:   oropharynx moist  lips not cyanotic  Neck:  thyroid not enlarged  supple  Respiratory:  no respiratory distress  normal breath sounds  no rales  Cardiovascular:  no jugular venous distention  regular rhythm  apical impulse normal  S1 normal, S2 normal  no S3, no S4   Soft apical systolic murmur  no rub, no thrill  carotid pulses normal; no bruit  pedal pulses normal  lower extremity edema: none    Gastrointestinal:   bowel sounds " "normal  non-tender  no hepatomegaly, no splenomegaly  Musculoskeletal:  no clubbing of fingers.   normocephalic, head atraumatic  Skin:   warm, dry  Neuro/Psychiatric:  Some baseline confusion, normal mood and affect    Cardiographics:     ECG  (personally reviewed) sinus rhythm, normal intervals, no significant ST changes   Telemetry:  (personally reviewed)    ECHO: Reviewed   CATH:     CARDIOLITE:      Lab Review:       Results from last 7 days   Lab Units 11/20/24 0518 11/19/24  0511 11/18/24  0140 11/15/24  1437   WBC 10*3/mm3 8.53 8.25 7.09 8.99   HEMOGLOBIN g/dL 11.1* 12.4* 11.4* 12.2*   HEMATOCRIT % 34.1* 37.9 34.2* 37.2*            Results from last 7 days   Lab Units 11/20/24 0518 11/19/24  0511 11/18/24  0140 11/15/24  1437   SODIUM mmol/L 128* 128* 130* 128*   BUN mg/dL 19 16 11 13   CREATININE mg/dL 0.78 0.80 0.74* 0.86   GLUCOSE mg/dL 103* 93 94 99      Estimated Creatinine Clearance: 77.7 mL/min (by C-G formula based on SCr of 0.78 mg/dL).         Invalid input(s): \"LDLCALC\"          Lab Results   Component Value Date    TSH 3.140 11/19/2024      No results found for: \"HGBA1C\"        No results found for: \"DIGOXIN\"   No results found for: \"DDIMERQUAN\"     Imaging:          The ASCVD Risk score (Susana PRASAD, et al., 2019) failed to calculate for the following reasons:    The 2019 ASCVD risk score is only valid for ages 40 to 79      Echo reviewed  Results for orders placed during the hospital encounter of 11/17/24    Adult Transthoracic Echo Complete W/ Cont if Necessary Per Protocol    Interpretation Summary    Left ventricular systolic function is moderately decreased. Calculated left ventricular EF = 33.7%    Left ventricular diastolic function is consistent with (grade I) impaired relaxation.    There is moderate calcification of the aortic valve.    Moderate to severe mitral valve regurgitation is present.    Estimated right ventricular systolic pressure from tricuspid regurgitation is mildly " elevated (35-45 mmHg). Calculated right ventricular systolic pressure from tricuspid regurgitation is 44 mmHg.        Assessment:      Rib pain on left side    LV dysfunction    Nonrheumatic mitral valve regurgitation      Initial cardiac assessment: 84-year-old white male with baseline dementia, presents with mechanical fall and rib fractures.  He had small pleural effusions on CT scan and therefore an echo was done which showed left ventricular dysfunction with mitral regurgitation.  No previous echo is available for comparison      Recommendations:  1.  Clinically the patient does not appear to be in heart failure.  More than likely the echo findings are somewhat chronic although I do not have any previous for comparison.  His BNP was normal, troponin with no significant elevation or delta.  EKG shows no ischemic changes.  2.  Given the patient's baseline dementia and frailty will manage medically.  Start low-dose beta-blocker and Arni  3.  Does not need diuresis at this time from my standpoint                  Dane Larios MD  11/20/2024    08:34 EST

## 2024-11-21 LAB
ANION GAP SERPL CALCULATED.3IONS-SCNC: 8.3 MMOL/L (ref 5–15)
BACTERIA UR QL AUTO: ABNORMAL /HPF
BILIRUB UR QL STRIP: NEGATIVE
BUN SERPL-MCNC: 12 MG/DL (ref 8–23)
BUN/CREAT SERPL: 16.9 (ref 7–25)
CALCIUM SPEC-SCNC: 8.9 MG/DL (ref 8.6–10.5)
CHLORIDE SERPL-SCNC: 94 MMOL/L (ref 98–107)
CLARITY UR: ABNORMAL
CO2 SERPL-SCNC: 23.7 MMOL/L (ref 22–29)
COLOR UR: ABNORMAL
CREAT SERPL-MCNC: 0.71 MG/DL (ref 0.76–1.27)
EGFRCR SERPLBLD CKD-EPI 2021: 89.9 ML/MIN/1.73
GLUCOSE SERPL-MCNC: 88 MG/DL (ref 65–99)
GLUCOSE UR STRIP-MCNC: NEGATIVE MG/DL
HGB UR QL STRIP.AUTO: NEGATIVE
HYALINE CASTS UR QL AUTO: ABNORMAL /LPF
KETONES UR QL STRIP: ABNORMAL
LEUKOCYTE ESTERASE UR QL STRIP.AUTO: ABNORMAL
NITRITE UR QL STRIP: NEGATIVE
NT-PROBNP SERPL-MCNC: 944.8 PG/ML (ref 0–1800)
OSMOLALITY UR: 464 MOSM/KG (ref 50–1400)
PH UR STRIP.AUTO: 5.5 [PH] (ref 5–8)
POTASSIUM SERPL-SCNC: 4.5 MMOL/L (ref 3.5–5.2)
PROT UR QL STRIP: NEGATIVE
RBC # UR STRIP: ABNORMAL /HPF
REF LAB TEST METHOD: ABNORMAL
SODIUM SERPL-SCNC: 126 MMOL/L (ref 136–145)
SODIUM UR-SCNC: <20 MMOL/L
SP GR UR STRIP: 1.02 (ref 1–1.03)
SQUAMOUS #/AREA URNS HPF: ABNORMAL /HPF
UROBILINOGEN UR QL STRIP: ABNORMAL
WBC # UR STRIP: ABNORMAL /HPF
WHOLE BLOOD HOLD SPECIMEN: NORMAL

## 2024-11-21 PROCEDURE — 83935 ASSAY OF URINE OSMOLALITY: CPT | Performed by: INTERNAL MEDICINE

## 2024-11-21 PROCEDURE — 99232 SBSQ HOSP IP/OBS MODERATE 35: CPT | Performed by: INTERNAL MEDICINE

## 2024-11-21 PROCEDURE — 97116 GAIT TRAINING THERAPY: CPT

## 2024-11-21 PROCEDURE — 83880 ASSAY OF NATRIURETIC PEPTIDE: CPT | Performed by: INTERNAL MEDICINE

## 2024-11-21 PROCEDURE — 97110 THERAPEUTIC EXERCISES: CPT

## 2024-11-21 PROCEDURE — 81001 URINALYSIS AUTO W/SCOPE: CPT | Performed by: INTERNAL MEDICINE

## 2024-11-21 PROCEDURE — 80048 BASIC METABOLIC PNL TOTAL CA: CPT | Performed by: INTERNAL MEDICINE

## 2024-11-21 PROCEDURE — 84300 ASSAY OF URINE SODIUM: CPT | Performed by: INTERNAL MEDICINE

## 2024-11-21 RX ADMIN — ACETAMINOPHEN 1000 MG: 500 TABLET ORAL at 19:56

## 2024-11-21 RX ADMIN — SACUBITRIL AND VALSARTAN 1 TABLET: 24; 26 TABLET, FILM COATED ORAL at 19:56

## 2024-11-21 RX ADMIN — Medication 10 ML: at 08:53

## 2024-11-21 RX ADMIN — LIDOCAINE 2 PATCH: 4 PATCH TOPICAL at 08:53

## 2024-11-21 RX ADMIN — SACUBITRIL AND VALSARTAN 1 TABLET: 24; 26 TABLET, FILM COATED ORAL at 08:53

## 2024-11-21 RX ADMIN — HYDROXYZINE HYDROCHLORIDE 25 MG: 25 TABLET, FILM COATED ORAL at 19:56

## 2024-11-21 RX ADMIN — DONEPEZIL HYDROCHLORIDE 10 MG: 10 TABLET, FILM COATED ORAL at 19:56

## 2024-11-21 RX ADMIN — HYDROCODONE BITARTRATE AND ACETAMINOPHEN 1 TABLET: 5; 325 TABLET ORAL at 05:07

## 2024-11-21 RX ADMIN — Medication 5 MG: at 19:56

## 2024-11-21 RX ADMIN — ACETAMINOPHEN 1000 MG: 500 TABLET ORAL at 08:52

## 2024-11-21 RX ADMIN — CARVEDILOL 3.12 MG: 3.12 TABLET, FILM COATED ORAL at 08:53

## 2024-11-21 RX ADMIN — HYDROXYZINE HYDROCHLORIDE 25 MG: 25 TABLET, FILM COATED ORAL at 06:22

## 2024-11-21 RX ADMIN — HYDROCODONE BITARTRATE AND ACETAMINOPHEN 1 TABLET: 5; 325 TABLET ORAL at 00:08

## 2024-11-21 RX ADMIN — Medication 10 ML: at 19:56

## 2024-11-21 RX ADMIN — ATORVASTATIN CALCIUM 20 MG: 20 TABLET, FILM COATED ORAL at 19:56

## 2024-11-21 NOTE — PROGRESS NOTES
CARDIOLOGY  INPATIENT PROGRESS NOTE         [address]    11/21/2024      PATIENT IDENTIFICATION:   Name:  Arnaldo Benoit      MRN:  [id number]     84 y.o.  male             Reason for visit: Mechanical fall with rib fractures.      SUBJECTIVE:    The patient is sitting up in a chair.  Breathing better with somewhat less pain this morning.  Vital signs are stable.  OBJECTIVE:  Vitals:    11/20/24 1916 11/20/24 2339 11/21/24 0508 11/21/24 0828   BP: 93/59 148/74 137/71 134/78   BP Location: Right arm Right arm Right arm Left arm   Patient Position: Lying Lying Lying Lying   Pulse: 71 89 84 96   Resp: 20 20 20 19   Temp: 98.1 °F (36.7 °C) 97.8 °F (36.6 °C) 97.3 °F (36.3 °C) 97.3 °F (36.3 °C)   TempSrc: Oral Oral Oral Oral   SpO2: 95% 93% 93% 97%   Weight:       Height:               Body mass index is 28.09 kg/m².    Intake/Output Summary (Last 24 hours) at 11/21/2024 1007  Last data filed at 11/21/2024 0830  Gross per 24 hour   Intake 610 ml   Output 200 ml   Net 410 ml       Telemetry:     Review of Systems   Respiratory:  Negative for chest tightness and shortness of breath.    Musculoskeletal:         Left rib cage pain         Exam:  General appearance no acute distress    well nourished   HEENT sclerae non-icteric    lips not cyanotic   Respiratory rate and depth normal    normal breath sounds    no rales, no wheeze   Cardiovascular JVP normal    regular rhythm    S1 normal, S2 normal    no S3, no S4    no murmur    lower extremity edema:none   Abdominal bowel sounds normal    abdomen soft, non-tender    no hepatosplenomegaly   Neuro-psych oriented to person, place, time    cooperative     Allergies   Allergen Reactions    Beef-Derived Products Unknown - Low Severity     Scheduled meds:  acetaminophen, 1,000 mg, Oral, TID  atorvastatin, 20 mg, Oral, Nightly  carvedilol, 3.125 mg, Oral, BID With Meals  donepezil, 10 mg, Oral, Nightly  Lidocaine, 2 patch, Transdermal,  "Daily  sacubitril-valsartan, 1 tablet, Oral, Q12H  sodium chloride, 10 mL, Intravenous, Q12H      IV meds:                         Data Review:  Results from last 7 days   Lab Units 11/21/24  0536 11/20/24  0518 11/19/24  0511 11/18/24  0140   SODIUM mmol/L 126* 128* 128* 130*   BUN mg/dL 12 19 16 11   CREATININE mg/dL 0.71* 0.78 0.80 0.74*   GLUCOSE mg/dL 88 103* 93 94             Estimated Creatinine Clearance: 85.3 mL/min (A) (by C-G formula based on SCr of 0.71 mg/dL (L)).  Results from last 7 days   Lab Units 11/20/24  0518 11/19/24  0511 11/18/24 0140 11/15/24  1437   WBC 10*3/mm3 8.53 8.25 7.09 8.99   HEMOGLOBIN g/dL 11.1* 12.4* 11.4* 12.2*         Results from last 7 days   Lab Units 11/19/24  0511 11/18/24  0140 11/15/24  1437   ALT (SGPT) U/L 7 7 11   AST (SGOT) U/L 18 19 20     No results found for: \"DIGOXIN\"   Lab Results   Component Value Date    TSH 3.140 11/19/2024           Invalid input(s): \"LDLCALC\"            Imaging (last 24 hr):   Imaging Results (Last 24 Hours)       Procedure Component Value Units Date/Time    XR Wrist 2 View Left [[id number]] Collected: 11/20/24 1155     Updated: 11/20/24 1202    Narrative:      XR WRIST 2 VW LEFT    Date of Exam: 11/20/2024 10:29 AM EST    Indication: Fall with left wrist pain mild swelling. Numbness in fingers.    Comparison: None available.    Findings:  No fracture or malalignment is identified. The bones appear diffusely osteopenic. 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 Atherosclerotic calcification is noted.      Impression:      Impression:  No acute fracture or malalignment      Electronically Signed: Emerson Day MD    11/20/2024 12:00 PM EST    Workstation ID: [id number]              ASSESSMENT:     Rib pain on left side    LV dysfunction    Nonrheumatic mitral valve regurgitation        PLAN:  1.  Continue carvedilol and Entresto  2.  Volume status is stable  3.  Will continue to manage medically, no plans for any " invasive workup.  4.  Would seem stable for discharge from my standpoint depending on his physical limitations due to the rib fractures.  Will arrange office follow-up                Dane Larios MD  11/21/2024    10:07 EST

## 2024-11-21 NOTE — PLAN OF CARE
Goal Outcome Evaluation:  Plan of Care Reviewed With: patient        Progress: improving  Outcome Evaluation: Patient remains alert and oriented x4 with intermittent forgetfulness. No complaints of pain throughout the shift. Patient up in recliner most of the shift. No new issues at this time.

## 2024-11-21 NOTE — THERAPY TREATMENT NOTE
Acute Care - Physical Therapy Treatment Note  DIANNA Bang     Patient Name: Arnaldo Benoit  : 1940  MRN: [id number]  Today's Date: 2024      Visit Dx:     ICD-10-CM ICD-9-CM   1. Multiple rib fractures involving four or more ribs  S22.49XA 807.09   2. Pleural effusion  J90 511.9   3. Difficulty walking  R26.2 719.7     Patient Active Problem List   Diagnosis    Skin cancer of scalp    Rib pain on left side    LV dysfunction    Nonrheumatic mitral valve regurgitation     Past Medical History:   Diagnosis Date    Dementia     early    Hyperlipidemia      History reviewed. No pertinent surgical history.  PT Assessment (Last 12 Hours)       PT Evaluation and Treatment       Row Name 24 1155          Physical Therapy Time and Intention    Subjective Information complains of;weakness;fatigue  -DK     Document Type therapy note (daily note)  -DK     Mode of Treatment individual therapy;physical therapy  -DK     Patient Effort good  -DK     Symptoms Noted During/After Treatment fatigue  -DK     Comment Pt appears [place], but is more awake / alert, able to participate in exercises, transfers and gait this session.  -DK       Row Name 24 1155          Pain    Pretreatment Pain Rating 0/10 - no pain  -DK     Posttreatment Pain Rating 0/10 - no pain  -DK       Row Name 24 1155          Cognition    Affect/Mental Status (Cognition) confused;flat/blunted affect;low arousal/lethargic  -DK     Behavioral Issues (Cognition) overwhelmed easily;difficulty managing stress  -DK     Orientation Status (Cognition) oriented to;person;situation  -DK     Follows Commands (Cognition) physical/tactile prompts required;repetition of directions required;verbal cues/prompting required  -DK     Cognitive Function (Cognition) attention deficit  -DK     Attention Deficit (Cognition) arousal/alertness;concentration;focused/sustained attention  -DK     Personal Safety Interventions gait belt;nonskid shoes/slippers when out  of bed;supervised activity  -DK       Row Name 11/21/24 1155          Transfers    Transfers sit-stand transfer;stand-sit transfer  -DK       Row Name 11/21/24 1155          Sit-Stand Transfer    Sit-Stand [place] (Transfers) standby assist;contact guard;1 person assist  -DK     Assistive Device (Sit-Stand Transfers) walker, front-wheeled  -DK       Row Name 11/21/24 1155          Stand-Sit Transfer    Stand-Sit [place] (Transfers) standby assist;contact guard;1 person assist  -DK     Assistive Device (Stand-Sit Transfers) walker, front-wheeled  -DK       Row Name 11/21/24 1155          Gait/Stairs (Locomotion)    Gait/Stairs Locomotion gait/ambulation independence;gait/ambulation assistive device;distance ambulated;gait pattern  -DK     [place] Level (Gait) standby assist;contact guard;1 person assist  -DK     Assistive Device (Gait) walker, front-wheeled  -DK     Distance in Feet (Gait) 80  -DK     Pattern (Gait) step-through  -DK     Deviations/Abnormal Patterns (Gait) jossue decreased;festinating/shuffling;gait speed decreased;stride length decreased  -DK     Bilateral Gait Deviations forward flexed posture  -DK     Comment, (Gait/Stairs) Pt ambulated on room air with a rolling walker.  He returned to the recliner on alert post treatment. Pt is mildly impulsive with transfers and gait.  -DK       Row Name 11/21/24 1151          Safety Issues/Impairments Affecting Functional Mobility    Safety Issues Affecting Function (Mobility) impulsivity;ability to follow commands;awareness of need for assistance;judgment;safety precaution awareness  -DK     Impairments Affecting Function (Mobility) balance;cognition;endurance/activity tolerance;strength  hearing  -DK     Cognitive Impairments, Mobility Safety/Performance attention;impulsivity;judgment;safety precaution awareness  -DK       Row Name 11/21/24 1150          Balance    Balance Assessment sitting static balance;sitting dynamic balance;standing  static balance;standing dynamic balance  -DK     Static Sitting Balance standby assist  -DK     Dynamic Sitting Balance standby assist  -DK     Position, Sitting Balance unsupported;sitting in chair  -DK     Static Standing Balance standby assist;contact guard;1-person assist  -DK     Dynamic Standing Balance standby assist;contact guard;1-person assist  -DK     Position/Device Used, Standing Balance walker, front-wheeled  -DK     Balance Interventions standing;dynamic;tandem gait  -       Row Name 11/21/24 1155          Motor Skills    Motor Skills --  therapeutic exercises  -DK     Therapeutic Exercise hip;knee;ankle  -DK     Additional Documentation --  Pt does require occasional repeated cues to perform exercises correctly and consistently.  -       Row Name 11/21/24 1155          Hip (Therapeutic Exercise)    Hip (Therapeutic Exercise) AROM (active range of motion)  -     Hip AROM (Therapeutic Exercise) bilateral;flexion;extension;aBduction;aDduction;sitting;20 repititions  -       Row Name 11/21/24 1155          Knee (Therapeutic Exercise)    Knee (Therapeutic Exercise) AROM (active range of motion)  -     Knee AROM (Therapeutic Exercise) bilateral;flexion;extension;LAQ (long arc quad);sitting;20 repititions  -       Row Name 11/21/24 1155          Ankle (Therapeutic Exercise)    Ankle (Therapeutic Exercise) AROM (active range of motion)  -     Ankle AROM (Therapeutic Exercise) bilateral;dorsiflexion;plantarflexion;sitting;20 repititions  -       Row Name             Wound 11/18/24 0435 Right upper arm    Wound - Properties Group Placement Date: 11/18/24  -JS Placement Time: 0435  -JS Side: Right  -JS Orientation: upper  -JS Location: arm  -JS    Retired Wound - Properties Group Placement Date: 11/18/24  -JS Placement Time: 0435  -JS Side: Right  -JS Orientation: upper  -JS Location: arm  -JS    Retired Wound - Properties Group Placement Date: 11/18/24  -JS Placement Time: 0435  -JS Side: Right   -JS Orientation: upper  -JS Location: arm  -JS    Retired Wound - Properties Group Date first assessed: 11/18/24  -JS Time first assessed: 0435  -JS Side: Right  -JS Location: arm  -JS      Row Name 11/21/24 1155          Plan of Care Review    Plan of Care Reviewed With patient  -DK     Progress improving  -DK       Row Name 11/21/24 1155          Positioning and Restraints    Pre-Treatment Position sitting in chair/recliner  -DK     Post Treatment Position chair  -DK     In Chair reclined;call light within reach;encouraged to call for assist;exit alarm on;legs elevated;heels elevated;waffle cushion  -DK       Row Name 11/21/24 1155          Therapy Assessment/Plan (PT)    Rehab Potential (PT) good  -DK     Criteria for Skilled Interventions Met (PT) skilled treatment is necessary  -DK     Therapy Frequency (PT) daily  -DK     Problem List (PT) problems related to;balance;cognition;mobility;strength;hearing;communication  -DK     Activity Limitations Related to Problem List (PT) unable to ambulate safely;unable to transfer safely  -DK       Row Name 11/21/24 1155          Progress Summary (PT)    Progress Toward Functional Goals (PT) progress toward functional goals is good  -DK               User Key  (r) = Recorded By, (t) = Taken By, (c) = Cosigned By      Initials Name Provider Type    Dilma Rao PTA Physical Therapist Assistant    Cesilai Mckeon, RN Registered Nurse                    Physical Therapy Education        No education to display                  PT Recommendation and Plan  Planned Therapy Interventions (PT): balance training, bed mobility training, gait training, home exercise program, strengthening, transfer training  Therapy Frequency (PT): daily  Progress Summary (PT)  Progress Toward Functional Goals (PT): progress toward functional goals is good  Plan of Care Reviewed With: patient  Progress: improving   Outcome Measures       Row Name 11/21/24 1155 11/20/24 0904 11/18/24 1300        How much help from another person do you currently need...    Turning from your back to your side while in flat bed without using bedrails? 4  -DK 4  -DK 4  -CS    Moving from lying on back to sitting on the side of a flat bed without bedrails? 4  -DK 4  -DK 4  -CS    Moving to and from a bed to a chair (including a wheelchair)? 3  -DK 3  -DK 3  -CS    Standing up from a chair using your arms (e.g., wheelchair, bedside chair)? 3  -DK 3  -DK 3  -CS    Climbing 3-5 steps with a railing? 2  -DK 2  -DK 2  -CS    To walk in hospital room? 3  -DK 3  -DK 3  -CS    AM-PAC 6 Clicks Score (PT) 19  -DK 19  -DK 19  -CS       Functional Assessment    Outcome Measure Options AM-PAC 6 Clicks Basic Mobility (PT)  -DK AM-PAC 6 Clicks Basic Mobility (PT)  -DK AM-PAC 6 Clicks Basic Mobility (PT)  -CS              User Key  (r) = Recorded By, (t) = Taken By, (c) = Cosigned By      Initials Name Provider Type    Dilma Rao PTA Physical Therapist Assistant    CS Sri Castellanos PT Physical Therapist                     Time Calculation:    PT Charges       Row Name 11/21/24 1200             Time Calculation    PT Received On 11/21/24  -DK      PT Goal Re-Cert Due Date 11/27/24  -DK         Timed Charges    57823 - PT Therapeutic Exercise Minutes 16  -DK      98041 - Gait Training Minutes  8  -DK      08040 - PT Therapeutic Activity Minutes 8  -DK         Total Minutes    Timed Charges Total Minutes 32  -DK       Total Minutes 32  -DK                User Key  (r) = Recorded By, (t) = Taken By, (c) = Cosigned By      Initials Name Provider Type    Dilma Rao PTA Physical Therapist Assistant                  Therapy Charges for Today       Code Description Service Date Service Provider Modifiers Qty    18446682902 HC PT THER PROC EA 15 MIN 11/20/2024 Dilma Velazquez PTA GP 1    77117487392 HC PT THERAPEUTIC ACT EA 15 MIN 11/20/2024 Dilma Velazquez PTA GP 1    27816579433 HC PT THER PROC EA 15 MIN 11/21/2024 Marcus  LULA Perera GP 1    17503237426 HC GAIT TRAINING EA 15 MIN 11/21/2024 Dilma Velazquez PTA GP 1            PT G-Codes  Outcome Measure Options: AM-PAC 6 Clicks Basic Mobility (PT)  AM-PAC 6 Clicks Score (PT): 19    Dilma Velazquez PTA  11/21/2024

## 2024-11-21 NOTE — PLAN OF CARE
Goal Outcome Evaluation:  Plan of Care Reviewed With: patient        Progress: no change  Outcome Evaluation: pt alert and oriented x3, disoriented to situation. frequently reoriented. vss on room air. prn pain medication administered for c/o left rib and wrist pain. no new issues/needs at this time.

## 2024-11-21 NOTE — PROGRESS NOTES
[facility]   Hospitalist Progress Note    Date of admission: 11/17/2024  Patient Name: Arnaldo Benoit  1940  Date: 11/21/2024      Subjective     Chief Complaint   Patient presents with    Rib Pain       Interval Followup: No chest pain or trouble breathing.  Denies any complaints of wrist pain today    Objective     Vitals:   Temp:  [97.3 °F (36.3 °C)-98.1 °F (36.7 °C)] 97.3 °F (36.3 °C)  Heart Rate:  [56-96] 71  Resp:  [16-20] 18  BP: ()/(59-80) 92/61    Physical Exam  Awake conversant, no acute distress, answers basic questions okay somewhat inconsistent at times has short-term memory problems and will be asked questions to use as before, consistent with his dementia similar to yesterday  CTAB no wheezing on room air  RRR no lower extremity pitting edema, systolic murmur  Abdo soft nontender nondistended  No wrist pain today    Result Review:  Vital signs, labs and recent relevant imaging reviewed.      CBC          11/18/2024    01:40 11/19/2024    05:11 11/20/2024    05:18   CBC   WBC 7.09  8.25  8.53    RBC 3.95  4.29  3.90    Hemoglobin 11.4  12.4  11.1    Hematocrit 34.2  37.9  34.1    MCV 86.6  88.3  87.4    MCH 28.9  28.9  28.5    MCHC 33.3  32.7  32.6    RDW 16.2  15.9  16.3    Platelets 244  280  273      CMP          11/19/2024    05:11 11/20/2024    05:18 11/21/2024    05:36   CMP   Glucose 93  103  88    BUN 16  19  12    Creatinine 0.80  0.78  0.71    EGFR 86.7  87.4  89.9    Sodium 128  128  126    Potassium 4.2  4.6  4.5    Chloride 92  94  94    Calcium 8.8  8.7  8.9    Total Protein 7.0      Albumin 4.0      Total Bilirubin 0.8      Alkaline Phosphatase 79      AST (SGOT) 18      ALT (SGPT) 7      BUN/Creatinine Ratio 20.0  24.4  16.9    Anion Gap 8.7  6.8  8.3          acetaminophen    aluminum-magnesium hydroxide-simethicone    senna-docusate sodium **AND** polyethylene glycol **AND** bisacodyl **AND** bisacodyl    Diclofenac Sodium    HYDROcodone-acetaminophen     HYDROcodone-acetaminophen    hydrOXYzine    melatonin    Morphine    nicotine    ondansetron    [COMPLETED] Insert peripheral IV **AND** sodium chloride    sodium chloride    sodium chloride    acetaminophen, 1,000 mg, Oral, TID  atorvastatin, 20 mg, Oral, Nightly  carvedilol, 3.125 mg, Oral, BID With Meals  donepezil, 10 mg, Oral, Nightly  Lidocaine, 2 patch, Transdermal, Daily  sacubitril-valsartan, 1 tablet, Oral, Q12H  sodium chloride, 10 mL, Intravenous, Q12H        XR Wrist 2 View Left    Result Date: 11/20/2024  Impression: No acute fracture or malalignment Electronically Signed: Emerson Day MD  11/20/2024 12:00 PM EST  Workstation ID: [id number]    CT Chest Without Contrast Diagnostic    Result Date: 11/18/2024  Interval slight increase in the bilateral pleural effusions. No pneumothorax is seen. Multiple bilateral rib fractures are present, as described above and previously. Otherwise, no significant interval change is seen since 11/15/2024. Please see above comments for further detail.   Portions of this note were completed with a voice recognition program.  Electronically Signed By-Alexis Hernandez MD On:11/18/2024 1:15 AM      CT Abdomen Pelvis With Contrast    Result Date: 11/15/2024  Impression: 1. Bilateral rib fractures primarily involving the right ribs along the anterior costochondral junction and involving the lateral left ribs. 2. Mild to moderate right pleural effusion. Pleural and diaphragmatic calcifications are noted suggestive of prior asbestosis exposure. There is some suspected scarring and atelectasis at the right lung base. 3. Abnormal enhancement along the rectal wall concerning for active rectal bleeding. Correlate clinically. Electronically Signed: Keshia Awan MD  11/15/2024 4:20 PM EST  Workstation ID: [id number]    CT Chest Without Contrast Diagnostic    Result Date: 11/15/2024  Impression: 1. Bilateral rib fractures primarily involving the right ribs along the anterior  costochondral junction and involving the lateral left ribs. 2. Mild to moderate right pleural effusion. Pleural and diaphragmatic calcifications are noted suggestive of prior asbestosis exposure. There is some suspected scarring and atelectasis at the right lung base. 3. Abnormal enhancement along the rectal wall concerning for active rectal bleeding. Correlate clinically. Electronically Signed: Keshia Awan MD  11/15/2024 4:20 PM EST  Workstation ID: [id number]     Assessment / Plan     Summary: 84 y.o. with dementia who had a mechanical fall with subsequent multiple rib fractures, moderate bilateral pleural effusions, pulmonology consulted for evaluation.  Echo resulting with systolic CHF new along with moderate-severe MVR, cardiology consulted.  Patient baseline is at a assisted living facility ultimately plans to return there at discharge    Assessment/Plan (clinically significant if listed here)  Follow-up multiple bilateral rib fractures (bilateral anterior, anterolateral acute-to-subacute rib   fractures (right: 2nd through 11th; left: 3rd through 12th)   Question new systolic CHF with moderate to severe MVR  Mild hyponatremia question setting of volume overload with CHF  Small bilateral effusions question secondary to CHF and/or fall with rib fracture  Dementia moderate  History of hypertension  Hyperlipidemia    Left wrist x-ray ordered no acute fracture and noted, doing better with Voltaren and can use wrist brace as needed/with sleep if needed no symptoms today  Continue Entresto and carvedilol, forms of stable no diuretics needed at this time monitor follow-up with cardiology outpatient  Continue pain medications for rib fractures and respiratory hygiene  Sodium slightly decreased at 126 today repeat BNP check urine studies for hyponatremia  Continue donepezil, delirium/dementia precautions  Scheduled as needed pain medications for rib fractures, and/primary respiratory hygiene, will place lidocaine  patch as well to try and help with symptoms   D/w pulm - no thoracentesis needed, appreciate assistance   PT/OT  Was at assisted living facility but has been having falls and suspect needs additional care than what he is getting there.    Discussed with social work who spoke with family further who is in agreement for rehab referrals    Dispo: rehab when available.  Working on [employer] for the next 1 to 2 days  D/w SW    VTE Prophylaxis:  Mechanical VTE prophylaxis orders are present.      Medical Intervention Limits: No intubation (DNI)  Level Of Support Discussed With: Health Care Surrogate  Code Status (Patient has no pulse and is not breathing): No CPR (Do Not Attempt to Resuscitate)  Medical Interventions (Patient has pulse or is breathing): Limited Support

## 2024-11-22 LAB
ANION GAP SERPL CALCULATED.3IONS-SCNC: 9.2 MMOL/L (ref 5–15)
BUN SERPL-MCNC: 12 MG/DL (ref 8–23)
BUN/CREAT SERPL: 14.8 (ref 7–25)
CALCIUM SPEC-SCNC: 8.8 MG/DL (ref 8.6–10.5)
CHLORIDE SERPL-SCNC: 98 MMOL/L (ref 98–107)
CO2 SERPL-SCNC: 24.8 MMOL/L (ref 22–29)
CREAT SERPL-MCNC: 0.81 MG/DL (ref 0.76–1.27)
EGFRCR SERPLBLD CKD-EPI 2021: 86.4 ML/MIN/1.73
GLUCOSE SERPL-MCNC: 102 MG/DL (ref 65–99)
OSMOLALITY SERPL: 274 MOSM/KG (ref 280–301)
POTASSIUM SERPL-SCNC: 4.5 MMOL/L (ref 3.5–5.2)
SODIUM SERPL-SCNC: 132 MMOL/L (ref 136–145)
WHOLE BLOOD HOLD SPECIMEN: NORMAL

## 2024-11-22 PROCEDURE — 99232 SBSQ HOSP IP/OBS MODERATE 35: CPT | Performed by: INTERNAL MEDICINE

## 2024-11-22 PROCEDURE — 83930 ASSAY OF BLOOD OSMOLALITY: CPT | Performed by: INTERNAL MEDICINE

## 2024-11-22 PROCEDURE — 80048 BASIC METABOLIC PNL TOTAL CA: CPT | Performed by: INTERNAL MEDICINE

## 2024-11-22 RX ADMIN — SACUBITRIL AND VALSARTAN 0.5 TABLET: 24; 26 TABLET, FILM COATED ORAL at 22:03

## 2024-11-22 RX ADMIN — Medication 10 ML: at 22:06

## 2024-11-22 RX ADMIN — ATORVASTATIN CALCIUM 20 MG: 20 TABLET, FILM COATED ORAL at 22:02

## 2024-11-22 RX ADMIN — Medication 10 ML: at 08:54

## 2024-11-22 RX ADMIN — ACETAMINOPHEN 1000 MG: 500 TABLET ORAL at 08:52

## 2024-11-22 RX ADMIN — DONEPEZIL HYDROCHLORIDE 10 MG: 10 TABLET, FILM COATED ORAL at 22:06

## 2024-11-22 RX ADMIN — CARVEDILOL 3.12 MG: 3.12 TABLET, FILM COATED ORAL at 08:51

## 2024-11-22 RX ADMIN — HYDROCODONE BITARTRATE AND ACETAMINOPHEN 1 TABLET: 5; 325 TABLET ORAL at 22:03

## 2024-11-22 RX ADMIN — HYDROXYZINE HYDROCHLORIDE 25 MG: 25 TABLET, FILM COATED ORAL at 22:06

## 2024-11-22 RX ADMIN — SACUBITRIL AND VALSARTAN 1 TABLET: 24; 26 TABLET, FILM COATED ORAL at 08:53

## 2024-11-22 RX ADMIN — ACETAMINOPHEN 1000 MG: 500 TABLET ORAL at 15:46

## 2024-11-22 RX ADMIN — LIDOCAINE 2 PATCH: 4 PATCH TOPICAL at 08:54

## 2024-11-22 NOTE — PLAN OF CARE
Goal Outcome Evaluation:  Plan of Care Reviewed With: patient        Progress: no change  Outcome Evaluation: Patient remains alert and oriented x4 with intermittent forgetfulness. Patient ambulated in hallway with standby assist with the walker. No need for PRN pain medication throughout the shift, just scheduled Tylenol. No new issues at this time.

## 2024-11-22 NOTE — PLAN OF CARE
Goal Outcome Evaluation:  Plan of Care Reviewed With: patient        Progress: no change  Outcome Evaluation: pt disoriented to situation this shift. vss on room air. pt has had no c/o pain/discomfort. up standby with walker to bathroom. no new issues/needs at this time.

## 2024-11-22 NOTE — PROGRESS NOTES
[facility]   Hospitalist Progress Note    Date of admission: 11/17/2024  Patient Name: Arnaldo Benoit  1940  Date: 11/22/2024      Subjective     Chief Complaint   Patient presents with    Rib Pain       Interval Followup: Mild left wrist pain today not as bad from when he first hurt it.  No shortness of air no chest pain acute complaints.    Objective     Vitals:   Temp:  [97.2 °F (36.2 °C)-97.5 °F (36.4 °C)] 97.3 °F (36.3 °C)  Heart Rate:  [56-96] 63  Resp:  [16-18] 18  BP: ()/(33-90) 92/60    Physical Exam  Sleeping wakes easily baseline dementia can answer basic questions okay   CTAB no wheezing on room air  RRR no lower extremity pitting edema, systolic murmur  Abdo soft nontender nondistended  No wrist pain today    Result Review:  Vital signs, labs and recent relevant imaging reviewed.      CBC          11/18/2024    01:40 11/19/2024    05:11 11/20/2024    05:18   CBC   WBC 7.09  8.25  8.53    RBC 3.95  4.29  3.90    Hemoglobin 11.4  12.4  11.1    Hematocrit 34.2  37.9  34.1    MCV 86.6  88.3  87.4    MCH 28.9  28.9  28.5    MCHC 33.3  32.7  32.6    RDW 16.2  15.9  16.3    Platelets 244  280  273      CMP          11/20/2024    05:18 11/21/2024    05:36 11/22/2024    05:30   CMP   Glucose 103  88  102    BUN 19  12  12    Creatinine 0.78  0.71  0.81    EGFR 87.4  89.9  86.4    Sodium 128  126  132    Potassium 4.6  4.5  4.5    Chloride 94  94  98    Calcium 8.7  8.9  8.8    BUN/Creatinine Ratio 24.4  16.9  14.8    Anion Gap 6.8  8.3  9.2          acetaminophen    aluminum-magnesium hydroxide-simethicone    senna-docusate sodium **AND** polyethylene glycol **AND** bisacodyl **AND** bisacodyl    Diclofenac Sodium    HYDROcodone-acetaminophen    hydrOXYzine    melatonin    Morphine    nicotine    ondansetron    [COMPLETED] Insert peripheral IV **AND** sodium chloride    sodium chloride    sodium chloride    acetaminophen, 1,000 mg, Oral, TID  atorvastatin, 20 mg, Oral, Nightly  carvedilol, 3.125  mg, Oral, BID With Meals  donepezil, 10 mg, Oral, Nightly  Lidocaine, 2 patch, Transdermal, Daily  sacubitril-valsartan, 1 tablet, Oral, Q12H  sodium chloride, 10 mL, Intravenous, Q12H        XR Wrist 2 View Left    Result Date: 11/20/2024  Impression: No acute fracture or malalignment Electronically Signed: Emerson Day MD  11/20/2024 12:00 PM EST  Workstation ID: [id number]    CT Chest Without Contrast Diagnostic    Result Date: 11/18/2024  Interval slight increase in the bilateral pleural effusions. No pneumothorax is seen. Multiple bilateral rib fractures are present, as described above and previously. Otherwise, no significant interval change is seen since 11/15/2024. Please see above comments for further detail.   Portions of this note were completed with a voice recognition program.  Electronically Signed By-Alexis Hernandez MD On:11/18/2024 1:15 AM      CT Abdomen Pelvis With Contrast    Result Date: 11/15/2024  Impression: 1. Bilateral rib fractures primarily involving the right ribs along the anterior costochondral junction and involving the lateral left ribs. 2. Mild to moderate right pleural effusion. Pleural and diaphragmatic calcifications are noted suggestive of prior asbestosis exposure. There is some suspected scarring and atelectasis at the right lung base. 3. Abnormal enhancement along the rectal wall concerning for active rectal bleeding. Correlate clinically. Electronically Signed: Keshia Awan MD  11/15/2024 4:20 PM EST  Workstation ID: [id number]    CT Chest Without Contrast Diagnostic    Result Date: 11/15/2024  Impression: 1. Bilateral rib fractures primarily involving the right ribs along the anterior costochondral junction and involving the lateral left ribs. 2. Mild to moderate right pleural effusion. Pleural and diaphragmatic calcifications are noted suggestive of prior asbestosis exposure. There is some suspected scarring and atelectasis at the right lung base. 3. Abnormal enhancement  along the rectal wall concerning for active rectal bleeding. Correlate clinically. Electronically Signed: Keshia Awan MD  11/15/2024 4:20 PM EST  Workstation ID: [id number]     Assessment / Plan     Summary: 84 y.o. with dementia who had a mechanical fall with subsequent multiple rib fractures, moderate bilateral pleural effusions, pulmonology consulted for evaluation.  Echo resulting with systolic CHF new along with moderate-severe MVR, cardiology consulted.  Patient baseline is at a assisted living facility ultimately plans to return there at discharge    Assessment/Plan (clinically significant if listed here)  Follow-up multiple bilateral rib fractures (bilateral anterior, anterolateral acute-to-subacute rib   fractures (right: 2nd through 11th; left: 3rd through 12th)   Question new systolic CHF with moderate to severe MVR  Mild hyponatremia question setting of volume overload with CHF  Small bilateral effusions question secondary to CHF and/or fall with rib fracture  Dementia moderate  History of hypertension  Hyperlipidemia    Left wrist x-ray ordered no acute fracture and noted, doing better with Voltaren and can use wrist brace as needed/with sleep if needed no symptoms today  Continue Entresto and carvedilol -having few intermittent hypotensive episodes will decrease Entresto dose and monitor  Monitor if any diuretic needs/volume status no acute diuretics needed currently  Sodium doing better today diet doing better  Continue pain medications for rib fractures and respiratory hygiene  Continue donepezil, delirium/dementia precautions  Scheduled as needed pain medications for rib fractures, and/primary respiratory hygiene, will place lidocaine patch as well to try and help with symptoms   D/w pulm - no thoracentesis needed, appreciate assistance   PT/OT  Previously at assisted living facility 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  Working on [employer]   D/w  SW    VTE Prophylaxis:  Mechanical VTE prophylaxis orders are present.      Medical Intervention Limits: No intubation (DNI)  Level Of Support Discussed With: Health Care Surrogate  Code Status (Patient has no pulse and is not breathing): No CPR (Do Not Attempt to Resuscitate)  Medical Interventions (Patient has pulse or is breathing): Limited Support

## 2024-11-23 PROCEDURE — 99232 SBSQ HOSP IP/OBS MODERATE 35: CPT | Performed by: INTERNAL MEDICINE

## 2024-11-23 RX ADMIN — SACUBITRIL AND VALSARTAN 0.5 TABLET: 24; 26 TABLET, FILM COATED ORAL at 09:08

## 2024-11-23 RX ADMIN — SACUBITRIL AND VALSARTAN 0.5 TABLET: 24; 26 TABLET, FILM COATED ORAL at 20:08

## 2024-11-23 RX ADMIN — HYDROXYZINE HYDROCHLORIDE 25 MG: 25 TABLET, FILM COATED ORAL at 23:43

## 2024-11-23 RX ADMIN — Medication 10 ML: at 09:09

## 2024-11-23 RX ADMIN — HYDROCODONE BITARTRATE AND ACETAMINOPHEN 1 TABLET: 5; 325 TABLET ORAL at 14:09

## 2024-11-23 RX ADMIN — CARVEDILOL 3.12 MG: 3.12 TABLET, FILM COATED ORAL at 17:19

## 2024-11-23 RX ADMIN — CARVEDILOL 3.12 MG: 3.12 TABLET, FILM COATED ORAL at 09:09

## 2024-11-23 RX ADMIN — ATORVASTATIN CALCIUM 20 MG: 20 TABLET, FILM COATED ORAL at 20:08

## 2024-11-23 RX ADMIN — ACETAMINOPHEN 1000 MG: 500 TABLET ORAL at 20:09

## 2024-11-23 RX ADMIN — DONEPEZIL HYDROCHLORIDE 10 MG: 10 TABLET, FILM COATED ORAL at 20:08

## 2024-11-23 RX ADMIN — LIDOCAINE 2 PATCH: 4 PATCH TOPICAL at 09:08

## 2024-11-23 RX ADMIN — ACETAMINOPHEN 1000 MG: 500 TABLET ORAL at 09:07

## 2024-11-23 RX ADMIN — ACETAMINOPHEN 1000 MG: 500 TABLET ORAL at 16:18

## 2024-11-23 RX ADMIN — HYDROCODONE BITARTRATE AND ACETAMINOPHEN 1 TABLET: 5; 325 TABLET ORAL at 23:43

## 2024-11-23 RX ADMIN — Medication 10 ML: at 20:09

## 2024-11-23 NOTE — PLAN OF CARE
Goal Outcome Evaluation:  Plan of Care Reviewed With: patient        Progress: improving  Outcome Evaluation: Patient awake, alert and oriented x 4, with reported periods of confusion.  Reports of left sided chest, flank, ribs and arm pain and numbness.  Medicated per MAR.  Patient sat in chair most of day.  Tolerated well.  Vitals stable, no distress noted.

## 2024-11-23 NOTE — PROGRESS NOTES
[facility]   Hospitalist Progress Note    Date of admission: 11/17/2024  Patient Name: Arnaldo Benoit  1940  Date: 11/23/2024      Subjective     Chief Complaint   Patient presents with    Rib Pain       Interval Followup: No acute events overnight wrist pain reasonable      Objective     Vitals:   Temp:  [97.3 °F (36.3 °C)-97.9 °F (36.6 °C)] 97.5 °F (36.4 °C)  Heart Rate:  [56-85] 85  Resp:  [16-18] 18  BP: ()/(33-83) 135/83    Physical Exam  Awake conversant resting in bed. baseline dementia can answer basic questions okay   CTAB no wheezing on room air  RRR no lower extremity pitting edema, systolic murmur  Abdo soft nontender nondistended  No wrist pain today    Result Review:  Vital signs, labs and recent relevant imaging reviewed.      CBC          11/18/2024    01:40 11/19/2024    05:11 11/20/2024    05:18   CBC   WBC 7.09  8.25  8.53    RBC 3.95  4.29  3.90    Hemoglobin 11.4  12.4  11.1    Hematocrit 34.2  37.9  34.1    MCV 86.6  88.3  87.4    MCH 28.9  28.9  28.5    MCHC 33.3  32.7  32.6    RDW 16.2  15.9  16.3    Platelets 244  280  273      CMP          11/20/2024    05:18 11/21/2024    05:36 11/22/2024    05:30   CMP   Glucose 103  88  102    BUN 19  12  12    Creatinine 0.78  0.71  0.81    EGFR 87.4  89.9  86.4    Sodium 128  126  132    Potassium 4.6  4.5  4.5    Chloride 94  94  98    Calcium 8.7  8.9  8.8    BUN/Creatinine Ratio 24.4  16.9  14.8    Anion Gap 6.8  8.3  9.2          acetaminophen    aluminum-magnesium hydroxide-simethicone    senna-docusate sodium **AND** polyethylene glycol **AND** bisacodyl **AND** bisacodyl    Diclofenac Sodium    HYDROcodone-acetaminophen    hydrOXYzine    melatonin    Morphine    nicotine    ondansetron    [COMPLETED] Insert peripheral IV **AND** sodium chloride    sodium chloride    sodium chloride    acetaminophen, 1,000 mg, Oral, TID  atorvastatin, 20 mg, Oral, Nightly  carvedilol, 3.125 mg, Oral, BID With Meals  donepezil, 10 mg, Oral,  Nightly  Lidocaine, 2 patch, Transdermal, Daily  sacubitril-valsartan, 0.5 tablet, Oral, Q12H  sodium chloride, 10 mL, Intravenous, Q12H        XR Wrist 2 View Left    Result Date: 11/20/2024  Impression: No acute fracture or malalignment Electronically Signed: Emerson Day MD  11/20/2024 12:00 PM EST  Workstation ID: [id number]    CT Chest Without Contrast Diagnostic    Result Date: 11/18/2024  Interval slight increase in the bilateral pleural effusions. No pneumothorax is seen. Multiple bilateral rib fractures are present, as described above and previously. Otherwise, no significant interval change is seen since 11/15/2024. Please see above comments for further detail.   Portions of this note were completed with a voice recognition program.  Electronically Signed By-Alexis Hernandez MD On:11/18/2024 1:15 AM      CT Abdomen Pelvis With Contrast    Result Date: 11/15/2024  Impression: 1. Bilateral rib fractures primarily involving the right ribs along the anterior costochondral junction and involving the lateral left ribs. 2. Mild to moderate right pleural effusion. Pleural and diaphragmatic calcifications are noted suggestive of prior asbestosis exposure. There is some suspected scarring and atelectasis at the right lung base. 3. Abnormal enhancement along the rectal wall concerning for active rectal bleeding. Correlate clinically. Electronically Signed: Keshia Awan MD  11/15/2024 4:20 PM EST  Workstation ID: [id number]    CT Chest Without Contrast Diagnostic    Result Date: 11/15/2024  Impression: 1. Bilateral rib fractures primarily involving the right ribs along the anterior costochondral junction and involving the lateral left ribs. 2. Mild to moderate right pleural effusion. Pleural and diaphragmatic calcifications are noted suggestive of prior asbestosis exposure. There is some suspected scarring and atelectasis at the right lung base. 3. Abnormal enhancement along the rectal wall concerning for active rectal  bleeding. Correlate clinically. Electronically Signed: Keshia Awan MD  11/15/2024 4:20 PM EST  Workstation ID: [id number]     Assessment / Plan     Summary: 84 y.o. with dementia who had a mechanical fall with subsequent multiple rib fractures, moderate bilateral pleural effusions, pulmonology consulted for evaluation.  Echo resulting with systolic CHF new along with moderate-severe MVR, cardiology consulted.  Patient baseline is at a assisted living facility ultimately plans to return there at discharge    Assessment/Plan (clinically significant if listed here)  Follow-up multiple bilateral rib fractures (bilateral anterior, anterolateral acute-to-subacute rib   fractures (right: 2nd through 11th; left: 3rd through 12th)   Question new systolic CHF with moderate to severe MVR  Mild hyponatremia question setting of volume overload with CHF  Small bilateral effusions question secondary to CHF and/or fall with rib fracture  Dementia moderate  History of hypertension  Hyperlipidemia    Continue Voltaren and as needed pain medications for left wrist  Continue Entresto and carvedilol low-dose blood pressure doing better today, probably not able to titrate much further we will continue to monitor  Monitor if any diuretic needs/volume status no acute diuretics needed currently  Sodium doing better today diet doing better  Continue pain medications for rib fractures and respiratory hygiene  Continue donepezil, delirium/dementia precautions  Scheduled as needed pain medications for rib fractures, and/primary respiratory hygiene, will place lidocaine patch as well to try and help with symptoms   D/w pulm - no thoracentesis needed, appreciate assistance   PT/OT  Dispo: rehab when available.  Previously at assisted living facility - Working [facility] [employer] - hopefully Monday   D/w SW    VTE Prophylaxis:  Mechanical VTE prophylaxis orders are present.      Medical Intervention Limits: No intubation (DNI)  Level Of Support  Discussed With: Health Care Surrogate  Code Status (Patient has no pulse and is not breathing): No CPR (Do Not Attempt to Resuscitate)  Medical Interventions (Patient has pulse or is breathing): Limited Support

## 2024-11-23 NOTE — PLAN OF CARE
Goal Outcome Evaluation:            Pt c/o left chest/ribs really hurting as pt described and pt was holding hand on left chest/stomach area. Medicated. Pt rested well afterwards and no further complaints. Rested well. Ambulatory with steady gait.

## 2024-11-24 ENCOUNTER — APPOINTMENT (OUTPATIENT)
Dept: CT IMAGING | Facility: HOSPITAL | Age: 84
DRG: 184 | End: 2024-11-24
Payer: MEDICARE

## 2024-11-24 ENCOUNTER — APPOINTMENT (OUTPATIENT)
Dept: MRI IMAGING | Facility: HOSPITAL | Age: 84
DRG: 184 | End: 2024-11-24
Payer: MEDICARE

## 2024-11-24 ENCOUNTER — APPOINTMENT (OUTPATIENT)
Dept: GENERAL RADIOLOGY | Facility: HOSPITAL | Age: 84
DRG: 184 | End: 2024-11-24
Payer: MEDICARE

## 2024-11-24 LAB
ANION GAP SERPL CALCULATED.3IONS-SCNC: 13.4 MMOL/L (ref 5–15)
BUN SERPL-MCNC: 14 MG/DL (ref 8–23)
BUN/CREAT SERPL: 17.5 (ref 7–25)
CALCIUM SPEC-SCNC: 8.9 MG/DL (ref 8.6–10.5)
CHLORIDE SERPL-SCNC: 98 MMOL/L (ref 98–107)
CO2 SERPL-SCNC: 19.6 MMOL/L (ref 22–29)
CREAT SERPL-MCNC: 0.8 MG/DL (ref 0.76–1.27)
DEPRECATED RDW RBC AUTO: 50.6 FL (ref 37–54)
EGFRCR SERPLBLD CKD-EPI 2021: 86.7 ML/MIN/1.73
ERYTHROCYTE [DISTWIDTH] IN BLOOD BY AUTOMATED COUNT: 16.2 % (ref 12.3–15.4)
GLUCOSE BLDC GLUCOMTR-MCNC: 90 MG/DL (ref 70–99)
GLUCOSE SERPL-MCNC: 93 MG/DL (ref 65–99)
HCT VFR BLD AUTO: 36.8 % (ref 37.5–51)
HGB BLD-MCNC: 12 G/DL (ref 13–17.7)
MAGNESIUM SERPL-MCNC: 2 MG/DL (ref 1.6–2.4)
MCH RBC QN AUTO: 28 PG (ref 26.6–33)
MCHC RBC AUTO-ENTMCNC: 32.6 G/DL (ref 31.5–35.7)
MCV RBC AUTO: 85.8 FL (ref 79–97)
PHOSPHATE SERPL-MCNC: 3.1 MG/DL (ref 2.5–4.5)
PLATELET # BLD AUTO: 330 10*3/MM3 (ref 140–450)
PMV BLD AUTO: 9.6 FL (ref 6–12)
POTASSIUM SERPL-SCNC: 4.4 MMOL/L (ref 3.5–5.2)
RBC # BLD AUTO: 4.29 10*6/MM3 (ref 4.14–5.8)
SODIUM SERPL-SCNC: 131 MMOL/L (ref 136–145)
TROPONIN T SERPL HS-MCNC: 28 NG/L
WBC NRBC COR # BLD AUTO: 8.85 10*3/MM3 (ref 3.4–10.8)
WHOLE BLOOD HOLD COAG: NORMAL

## 2024-11-24 PROCEDURE — 72141 MRI NECK SPINE W/O DYE: CPT

## 2024-11-24 PROCEDURE — 99233 SBSQ HOSP IP/OBS HIGH 50: CPT | Performed by: INTERNAL MEDICINE

## 2024-11-24 PROCEDURE — 84484 ASSAY OF TROPONIN QUANT: CPT | Performed by: INTERNAL MEDICINE

## 2024-11-24 PROCEDURE — 84100 ASSAY OF PHOSPHORUS: CPT | Performed by: INTERNAL MEDICINE

## 2024-11-24 PROCEDURE — 80048 BASIC METABOLIC PNL TOTAL CA: CPT | Performed by: INTERNAL MEDICINE

## 2024-11-24 PROCEDURE — G0508 CRIT CARE TELEHEA CONSULT 60: HCPCS | Performed by: PSYCHIATRY & NEUROLOGY

## 2024-11-24 PROCEDURE — 25010000002 ENOXAPARIN PER 10 MG: Performed by: INTERNAL MEDICINE

## 2024-11-24 PROCEDURE — 83735 ASSAY OF MAGNESIUM: CPT | Performed by: INTERNAL MEDICINE

## 2024-11-24 PROCEDURE — 85027 COMPLETE CBC AUTOMATED: CPT | Performed by: INTERNAL MEDICINE

## 2024-11-24 PROCEDURE — 70551 MRI BRAIN STEM W/O DYE: CPT

## 2024-11-24 PROCEDURE — 70450 CT HEAD/BRAIN W/O DYE: CPT

## 2024-11-24 PROCEDURE — 25010000002 MORPHINE PER 10 MG: Performed by: FAMILY MEDICINE

## 2024-11-24 PROCEDURE — 82948 REAGENT STRIP/BLOOD GLUCOSE: CPT

## 2024-11-24 PROCEDURE — 71045 X-RAY EXAM CHEST 1 VIEW: CPT

## 2024-11-24 RX ORDER — HYDROCODONE BITARTRATE AND ACETAMINOPHEN 5; 325 MG/1; MG/1
1 TABLET ORAL EVERY 4 HOURS PRN
Status: DISCONTINUED | OUTPATIENT
Start: 2024-11-24 | End: 2024-11-25 | Stop reason: HOSPADM

## 2024-11-24 RX ORDER — ENOXAPARIN SODIUM 100 MG/ML
40 INJECTION SUBCUTANEOUS
Status: DISCONTINUED | OUTPATIENT
Start: 2024-11-24 | End: 2024-11-25 | Stop reason: HOSPADM

## 2024-11-24 RX ADMIN — HYDROCODONE BITARTRATE AND ACETAMINOPHEN 1 TABLET: 5; 325 TABLET ORAL at 06:28

## 2024-11-24 RX ADMIN — ATORVASTATIN CALCIUM 20 MG: 20 TABLET, FILM COATED ORAL at 20:14

## 2024-11-24 RX ADMIN — Medication 10 ML: at 09:07

## 2024-11-24 RX ADMIN — SACUBITRIL AND VALSARTAN 0.5 TABLET: 24; 26 TABLET, FILM COATED ORAL at 20:14

## 2024-11-24 RX ADMIN — Medication 10 ML: at 20:14

## 2024-11-24 RX ADMIN — DONEPEZIL HYDROCHLORIDE 10 MG: 10 TABLET, FILM COATED ORAL at 20:14

## 2024-11-24 RX ADMIN — CARVEDILOL 3.12 MG: 3.12 TABLET, FILM COATED ORAL at 09:06

## 2024-11-24 RX ADMIN — HYDROXYZINE HYDROCHLORIDE 25 MG: 25 TABLET, FILM COATED ORAL at 20:14

## 2024-11-24 RX ADMIN — SACUBITRIL AND VALSARTAN 0.5 TABLET: 24; 26 TABLET, FILM COATED ORAL at 09:06

## 2024-11-24 RX ADMIN — CARVEDILOL 3.12 MG: 3.12 TABLET, FILM COATED ORAL at 17:13

## 2024-11-24 RX ADMIN — LIDOCAINE 2 PATCH: 4 PATCH TOPICAL at 09:06

## 2024-11-24 RX ADMIN — POLYETHYLENE GLYCOL 3350 17 G: 17 POWDER, FOR SOLUTION ORAL at 20:14

## 2024-11-24 RX ADMIN — SENNOSIDES AND DOCUSATE SODIUM 2 TABLET: 50; 8.6 TABLET ORAL at 20:14

## 2024-11-24 RX ADMIN — MORPHINE SULFATE 2 MG: 2 INJECTION, SOLUTION INTRAMUSCULAR; INTRAVENOUS at 07:25

## 2024-11-24 RX ADMIN — Medication 5 MG: at 20:14

## 2024-11-24 RX ADMIN — ENOXAPARIN SODIUM 40 MG: 100 INJECTION SUBCUTANEOUS at 09:06

## 2024-11-24 NOTE — PLAN OF CARE
Goal Outcome Evaluation:  Plan of Care Reviewed With: patient        Progress: improving        Pt c/o left rib area having pain and at times chest. Pt was medicated with scheduled tylenol at bedtime. Later in night pt requested pain medication and was medicated. Pt ambulatory with steady gait with walker and gait belt. Pt brushed his teeth while in bathroom early pm independently. Pt then returned to bed. Pt is forgetful.  Pt independently turns self in bed. Pt does call for assistance when needed, however at times sits up on side of bed and bed alarm does alarm and pt waits for staff to come assist. Pt speech is clear and able to voice needs.

## 2024-11-24 NOTE — NURSING NOTE
RRT called to room. Upon assessment patient was complaining of left hand tingling and weakness. Left foot also weaker upon intial assessment. Call escalated to Stroke RRT. See stroke care charting. Followup MRI recommended

## 2024-11-24 NOTE — CONSULTS
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 Patient reports left hand tingling noted this morning, unsure if he woke up with it, last known well unclear possibly last night. Patient on opioids for pain, received narco and morphine this am. [facility].   DDX includes traumatic nerve root/peripheral nerve injury, stroke, medication side effect. Recommend MRI brain and cervical spine. Outpatient EMG and NC. Pain management, avoid opioids.    Our recommendations are outlined below.    Recommendations:          Stroke/Telemetry Floor        Neuro Checks        Bedside Swallow Eval        DVT Prophylaxis        IV Fluids, Normal Saline        Head of Bed 30 Degrees        Euglycemia and Avoid Hyperthermia (PRN Acetaminophen)    Sign Out:        Discussed with Rapid Response Team        ------------------------------------------------------------------------------    Advanced Imaging:  Advanced Imaging Deferred because:    No cortical signs      Metrics:  Last Known Well: Unknown  Dispatch Time: 11/24/2024 07:48:46  Initial Response Time: 11/24/2024 07:54:58  Symptoms: left hand tingling .  Initial patient interaction: 11/24/2024 07:54:58  NIHSS Assessment Completed: 11/24/2024 08:00:00  Patient is not a candidate for Thrombolytic.  Thrombolytic Medical Decision: 11/24/2024 08:08:49  Patient was not deemed candidate for Thrombolytic because of following reasons:  LKW outside 4.5 hr window. .  Traumatic radiculopathy .    CT head showed no acute hemorrhage or acute core infarct.  I personally Reviewed the CT Head and it Showed  [facility]    Primary Provider Notified of Diagnostic Impression and Management Plan on: 11/24/2024 08:12:57  Spoke With: Rapid response team  Able to Reach  Attempted to reach the consulting physician but was unsuccessful        ------------------------------------------------------------------------------    History of Present Illness:  Patient is a 84 year old Male.    Inpatient stroke alert was called for symptoms of left hand tingling .  84 year old male with history of dementia, hypertension, hyperlipidemia admitted after a fall with rib fractures. Patient reports left hand tingling noted this morning, unsure if he woke up with it, last known well unclear possibly last night. Patient on opioids for pain, received narco and morphine this am.      Past Medical History:       Hypertension       Hyperlipidemia    Medications:    No Anticoagulant use   No Antiplatelet use  Reviewed EMR for current medications    Allergies:   Reviewed    Social History:  Smoking: Former    Family History:    There is no family history of premature cerebrovascular disease pertinent to this consultation    ROS :  14 Points Review of Systems was performed and was negative except mentioned in HPI.    Past Surgical History:  There Is No Surgical History Contributory To Today’s Visit        Examination:  BP(111/67), Pulse(109),  1A: Level of Consciousness - Alert; keenly responsive + 0  1B: Ask Month and Age - Both Questions Right + 0  1C: Blink Eyes & Squeeze Hands - Performs Both Tasks + 0  2: Test Horizontal Extraocular Movements - Normal + 0  3: Test Visual Fields - No Visual Loss + 0  4: Test Facial Palsy (Use Grimace if Obtunded) - Normal symmetry + 0  5A: Test Left Arm Motor Drift - No Drift for 10 Seconds + 0  5B: Test Right Arm Motor Drift - No Drift for 10 Seconds + 0  6A: Test Left Leg Motor Drift - No Drift for 5 Seconds + 0  6B: Test Right Leg Motor Drift - No Drift for 5 Seconds + 0  7: Test Limb Ataxia (FNF/Heel-Shin) - No Ataxia  + 0  8: Test Sensation - Normal; No sensory loss + 0  9: Test Language/Aphasia - Normal; No aphasia + 0  10: Test Dysarthria - Normal + 0  11: Test Extinction/Inattention - No abnormality + 0    NIHSS Score: 0      Pre-Morbid Modified [place] Scale:  3 Points = Moderate disability; requiring some help, but able to walk without assistance    Spoke with : Rapid response team  I reviewed the available imaging via Rapid and initiated discussion with the primary provider    This consult was conducted in real time using interactive audio and video technology. Patient was informed of the technology being used for this visit and agreed to proceed. Patient located in hospital and provider located at home/office setting.      Patient is being evaluated for possible acute neurologic impairment and high probability of imminent or life-threatening deterioration. I spent total of 35 minutes providing care to this patient, including time for face to face visit via telemedicine, review of medical records, imaging studies and discussion of findings with providers, the patient and/or family.      Dr Deena Geller      [facility]  For Inpatient follow-up with [facility] physician please call [facility] at [phone]. As we are not an outpatient service for any post hospital discharge needs please contact the hospital for assistance.  If you have any questions for the [facility] physicians or need to reconsult for clinical or diagnostic changes please contact us via [facility] at [phone].

## 2024-11-24 NOTE — SIGNIFICANT NOTE
11/24/24 0750   Physical Therapy Time and Intention   Session Not Performed unable to treat, medical status change   Comment, Session Not Performed RRT just called on pt.

## 2024-11-24 NOTE — PLAN OF CARE
Goal Outcome Evaluation:           Progress: no change  Outcome Evaluation: Patient alert and able to make needs known. Complained of left arm numbness and tingling and left foot numbness this morning. RRT called, changed to stroke RRT related to weakness. Had CT, and MRI. Referrals made. Paatient denies numbness and tingling now. Denies pain or discomfort now. Able to stand and walk with a walker and stand by assist. Eating well. Denies wants or needs at this time. Frequently used items within easy reach.       Andreina Curtis RN

## 2024-11-24 NOTE — PROGRESS NOTES
[facility]   Hospitalist Progress Note    Date of admission: 11/17/2024  Patient Name: Arnaldo Benoit  1940  Date: 11/24/2024      Subjective     Chief Complaint   Patient presents with    Rib Pain       Interval Followup: RRT today for patient complaints of left hand tingling and weakness and question left foot weakness, stroke RT/imaging obtained no acute findings on initial imaging.  Discussion with patient need does note that he had some more acutely worsened left-sided chest discomfort this morning over his area of rib fractures.  He did get some pain medicine and is feeling better in that regard.  Still notes some numbness tingling in his left hand/wrist area little better than this morning, evaluate.  He has had this intermittently over the past few days did seem to respond to Voltaren the other day but is fluctuated.  Does note some chronic intermittent bilateral lower extremity neuropathy/tingling at times but this is not new and denies any acute lower extremity symptoms.    Objective     Vitals:   Temp:  [97.2 °F (36.2 °C)-97.9 °F (36.6 °C)] 97.9 °F (36.6 °C)  Heart Rate:  [] 71  Resp:  [16-20] 16  BP: ()/(50-97) 133/97    Physical Exam  Awake conversant resting in bed. baseline dementia can answer basic questions okay/appropriately  Has left chest discomfort over area of rib fractures nothing acute  CTAB no wheezing on room air  RRR no lower extremity pitting edema, systolic murmur  Abdo soft nontender nondistended  Some discomfort in his left hand but minimal, good strength is still slightly weaker and does note decreased sensation to touch still     Result Review:  Vital signs, labs and recent relevant imaging reviewed.      CBC          11/19/2024    05:11 11/20/2024    05:18 11/24/2024    07:48   CBC   WBC 8.25  8.53  8.85    RBC 4.29  3.90  4.29    Hemoglobin 12.4  11.1  12.0    Hematocrit 37.9  34.1  36.8    MCV 88.3  87.4  85.8    MCH 28.9  28.5  28.0    MCHC 32.7  32.6  32.6     RDW 15.9  16.3  16.2    Platelets 280  273  330      CMP          11/21/2024    05:36 11/22/2024    05:30 11/24/2024    07:48   CMP   Glucose 88  102  93    BUN 12  12  14    Creatinine 0.71  0.81  0.80    EGFR 89.9  86.4  86.7    Sodium 126  132  131    Potassium 4.5  4.5  4.4    Chloride 94  98  98    Calcium 8.9  8.8  8.9    BUN/Creatinine Ratio 16.9  14.8  17.5    Anion Gap 8.3  9.2  13.4          acetaminophen    aluminum-magnesium hydroxide-simethicone    senna-docusate sodium **AND** polyethylene glycol **AND** bisacodyl **AND** bisacodyl    Diclofenac Sodium    HYDROcodone-acetaminophen    hydrOXYzine    melatonin    Morphine    nicotine    ondansetron    [COMPLETED] Insert peripheral IV **AND** sodium chloride    sodium chloride    sodium chloride    acetaminophen, 1,000 mg, Oral, TID  atorvastatin, 20 mg, Oral, Nightly  carvedilol, 3.125 mg, Oral, BID With Meals  donepezil, 10 mg, Oral, Nightly  enoxaparin, 40 mg, Subcutaneous, Q24H  Lidocaine, 2 patch, Transdermal, Daily  sacubitril-valsartan, 0.5 tablet, Oral, Q12H  sodium chloride, 10 mL, Intravenous, Q12H        XR Chest 1 View    Result Date: 11/24/2024  Impression: 1.Left lateral eighth through 10th rib fractures are again noted. The anterior right rib fractures seen on CT are not well demonstrated on this single view chest radiograph. No pneumothorax. 2.Small left pleural effusion and peripheral opacities compatible with calcified pleural plaques are unchanged. 3.No new findings Electronically Signed: Osmar Perez DO  11/24/2024 10:58 AM EST  Workstation ID: [id number]    CT Head Without Contrast Stroke Protocol    Result Date: 11/24/2024  Impression: 1.No acute intracranial abnormality. 2.Diffuse parenchymal atrophy and extensive chronic small vessel ischemic disease is unchanged. 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 Electronically Signed: Osmar Perez DO  11/24/2024 8:10 AM EST  Workstation ID: [id number]    XR Wrist 2 View Left    Result  Date: 11/20/2024  Impression: No acute fracture or malalignment Electronically Signed: Emerson Day MD  11/20/2024 12:00 PM EST  Workstation ID: [id number]    CT Chest Without Contrast Diagnostic    Result Date: 11/18/2024  Interval slight increase in the bilateral pleural effusions. No pneumothorax is seen. Multiple bilateral rib fractures are present, as described above and previously. Otherwise, no significant interval change is seen since 11/15/2024. Please see above comments for further detail.   Portions of this note were completed with a voice recognition program.  Electronically Signed By-Alexis Hernandez MD On:11/18/2024 1:15 AM       Assessment / Plan     Summary: 84 y.o. with dementia who had a mechanical fall with subsequent multiple rib fractures, moderate bilateral pleural effusions, pulmonology consulted for evaluation.  Echo resulting with systolic CHF new along with moderate-severe MVR, cardiology consulted.  Patient baseline is at a assisted living facility ultimately plans to return there at discharge    Assessment/Plan (clinically significant if listed here)  Follow-up multiple bilateral rib fractures (bilateral anterior, anterolateral acute-to-subacute rib   fractures (right: 2nd through 11th; left: 3rd through 12th)   Question new systolic CHF with moderate to severe MVR  Mild hyponatremia question setting of volume overload with CHF  Small bilateral effusions question secondary to CHF and/or fall with rib fracture  Dementia moderate  History of hypertension  Hyperlipidemia    Stroke RT today, appreciate teleneurology assistance, recommendations reviewed, initial CT imaging negative for acute findings  I have ordered an MRI brain and cervical spine to evaluate for other causes of neuropathy, his wrist x-ray was negative for fracture he did fall on that side and certainly could have some other area of impingement infecting   Will follow-up MRI but average suspicion at this time that he is having  residual symptoms from his fall and fractures especially as he is a poor historian at times with short term memory impairment at baseline.   Cont neurochecks for now, if mri shows cva will transfer. Cont tele  continue Voltaren and as needed pain medications for left wrist  Norco/morphine limited dosing as needed for breakthrough pain trying to avoid narcotics given his dementia.  Monitor/delirium precautions  Ordered stat labs reasonable no obvious source for symptoms today.  Still has mild hyponatremia but not severe.  Troponin flat  continue Entresto and carvedilol low-dose blood pressure doing better today, probably not able to titrate much further we will continue to monitor as well as to allow for hypertension in case any stroke component.  Monitor if any diuretic needs/volume status no acute diuretics needed currently  Sodium doing better today diet doing better  Continue pain medications for rib fractures and respiratory hygiene  Continue donepezil, delirium/dementia precautions  Scheduled as needed pain medications for rib fractures, and/primary respiratory hygiene, will place lidocaine patch as well to try and help with symptoms   D/w pulm - no thoracentesis needed, appreciate assistance   PT/OT  Dispo: rehab when available.  Previously at assisted living facility - [facility] - hopefully Monday   D/w SW    VTE Prophylaxis:  Pharmacologic & mechanical VTE prophylaxis orders are present.      Medical Intervention Limits: No intubation (DNI)  Level Of Support Discussed With: Health Care Surrogate  Code Status (Patient has no pulse and is not breathing): No CPR (Do Not Attempt to Resuscitate)  Medical Interventions (Patient has pulse or is breathing): Limited Support

## 2024-11-25 VITALS
RESPIRATION RATE: 16 BRPM | TEMPERATURE: 97.2 F | BODY MASS INDEX: 26.89 KG/M2 | HEIGHT: 70 IN | DIASTOLIC BLOOD PRESSURE: 60 MMHG | WEIGHT: 187.83 LBS | OXYGEN SATURATION: 96 % | HEART RATE: 64 BPM | SYSTOLIC BLOOD PRESSURE: 109 MMHG

## 2024-11-25 PROCEDURE — 97110 THERAPEUTIC EXERCISES: CPT

## 2024-11-25 PROCEDURE — 99221 1ST HOSP IP/OBS SF/LOW 40: CPT | Performed by: NEUROLOGICAL SURGERY

## 2024-11-25 PROCEDURE — 99239 HOSP IP/OBS DSCHRG MGMT >30: CPT | Performed by: INTERNAL MEDICINE

## 2024-11-25 PROCEDURE — 97116 GAIT TRAINING THERAPY: CPT

## 2024-11-25 PROCEDURE — 25010000002 ENOXAPARIN PER 10 MG: Performed by: INTERNAL MEDICINE

## 2024-11-25 RX ORDER — HYDROCODONE BITARTRATE AND ACETAMINOPHEN 5; 325 MG/1; MG/1
1 TABLET ORAL EVERY 4 HOURS PRN
Start: 2024-11-25 | End: 2024-11-28

## 2024-11-25 RX ORDER — LIDOCAINE 4 G/G
2 PATCH TOPICAL DAILY
Start: 2024-11-26

## 2024-11-25 RX ORDER — LACTULOSE 10 G/15ML
10 SOLUTION ORAL DAILY
Status: DISCONTINUED | OUTPATIENT
Start: 2024-11-25 | End: 2024-11-25 | Stop reason: HOSPADM

## 2024-11-25 RX ORDER — AMOXICILLIN 250 MG
1 CAPSULE ORAL 2 TIMES DAILY
Start: 2024-11-25

## 2024-11-25 RX ORDER — CARVEDILOL 3.12 MG/1
3.12 TABLET ORAL 2 TIMES DAILY WITH MEALS
Qty: 60 TABLET | Refills: 0 | Status: SHIPPED | OUTPATIENT
Start: 2024-11-25 | End: 2024-12-25

## 2024-11-25 RX ADMIN — ENOXAPARIN SODIUM 40 MG: 100 INJECTION SUBCUTANEOUS at 09:49

## 2024-11-25 RX ADMIN — HYDROXYZINE HYDROCHLORIDE 25 MG: 25 TABLET, FILM COATED ORAL at 09:55

## 2024-11-25 RX ADMIN — SACUBITRIL AND VALSARTAN 0.5 TABLET: 24; 26 TABLET, FILM COATED ORAL at 09:48

## 2024-11-25 RX ADMIN — BISACODYL 5 MG: 5 TABLET, COATED ORAL at 09:54

## 2024-11-25 RX ADMIN — Medication 10 ML: at 09:49

## 2024-11-25 RX ADMIN — CARVEDILOL 3.12 MG: 3.12 TABLET, FILM COATED ORAL at 09:49

## 2024-11-25 NOTE — PLAN OF CARE
Goal Outcome Evaluation:  Plan of Care Reviewed With: patient        Progress: no change  Outcome Evaluation: Pt is A&Ox3 this shift with difficulty with time. Pt denied pain/discomfort this shift. VSS. Falls precaution maintained. Pt will likely be discharged to [facility] today. No new issues or new needs noted at this time.

## 2024-11-25 NOTE — DISCHARGE SUMMARY
[facility]         HOSPITALIST  DISCHARGE SUMMARY    Patient Name: Arnaldo Benoit    : 1940    MRN: [id number]    Date of Admission: 2024  Date of Discharge:  24  Primary Care Physician: Celina Salgado APRN    Consults       Date and Time Order Name Status Description    2024  1:12 PM Inpatient Neurosurgery Consult Completed     2024 11:21 AM Inpatient Cardiology Consult      2024  7:02 AM Inpatient Pulmonology Consult Completed     2024  2:44 AM Inpatient Hospitalist Consult              Final Diagnosis:  Follow-up multiple bilateral rib fractures (bilateral anterior, anterolateral acute-to-subacute rib   fractures (right: 2nd through 11th; left: 3rd through )   Question new systolic CHF with moderate to severe MVR  Mild hyponatremia question setting of volume overload with CHF  Small bilateral effusions question secondary to CHF and/or fall with rib fracture  Severe spinal stenosis at C4-C5 with left greater than right upper extremity numbness tingling  Dementia moderate  History of hypertension  Hyperlipidemia    Hospital Course     Hospital Course:  84 y.o. with dementia who had a mechanical fall with subsequent multiple rib fractures, moderate bilateral pleural effusions, pulmonology consulted for evaluation.  Echo resulting with systolic CHF new along with moderate-severe MVR, cardiology consulted.  Medications adjusted for GDMT as tolerated, blood pressure limiting further titration at this time.  Cardiology did not suspect any diuretics needed at this time and can follow-up outpatient for further monitoring.  Given his advanced age and comorbidities/dementia slightly more conservative management recommended    Code stroke RT called during admission because of numbness tingling sensation in arms, neurology assisted with evaluation stroke imaging was negative, had cervical imaging that showing spinal stenosis, neurosurgery evaluated given  concerns for possible signal change on imaging, no surgery recommended, recommending work with physical therapy and follow-up outpatient if any worsening or new symptoms.  Again given his advanced age, systolic heart failure/MVR would be higher risk for surgical intervention.    Discharging [facility], patient was previously at assisted living facility but he had been having falls even prior to that episode that brought him to the hospital.  He may ultimately need higher level of care long-term depending on how he responds to therapy    Follow-up with cardiology in about 1 month for further monitoring and titration medications  Will follow-up with neurology outpatient additional neurocognitive testing and test for initiation of treatment for dementia at that time.  May benefit from medications for dementia but would defer until outpatient follow-up.     Patient discharged in stable condition with close outpatient follow up.     DISCHARGE Follow Up Recommendations for labs and diagnostics:   As above    CODE STATUS:  Code Status and Medical Interventions: No CPR (Do Not Attempt to Resuscitate); Limited Support; No intubation (DNI)   Ordered at: 11/18/24 0342     Medical Intervention Limits:    No intubation (DNI)     Level Of Support Discussed With:    Health Care Surrogate     Code Status (Patient has no pulse and is not breathing):    No CPR (Do Not Attempt to Resuscitate)     Medical Interventions (Patient has pulse or is breathing):    Limited Support           Day of Discharge     Vital Signs:  Temp:  [97.2 °F (36.2 °C)-97.7 °F (36.5 °C)] 97.3 °F (36.3 °C)  Heart Rate:  [51-89] 51  Resp:  [16] 16  BP: ()/(56-87) 106/64    Physical Exam  Gen: awake, resting in bed, conversant, alert to self, basic questions, has difficulty with higher level discussions and does have short-term memory, anxious at times.  Resp: breathing comfortably on room air  CV: RRR, no LE pitting edema  Abdomen soft  nontender  Some minor left hand numbness tingling sensation today, has been fluctuating,      Discharge Details        Discharge Medications        New Medications        Instructions Start Date   carvedilol 3.125 MG tablet  Commonly known as: COREG   3.125 mg, Oral, 2 Times Daily With Meals      HYDROcodone-acetaminophen 5-325 MG per tablet  Commonly known as: NORCO   1 tablet, Oral, Every 4 Hours PRN      Lidocaine 4 %   2 patches, Transdermal, Daily, Remove & Discard patch within 12 hours or as directed by MD   Start Date: November 26, 2024     sacubitril-valsartan 24-26 MG tablet  Commonly known as: ENTRESTO   0.5 tablets, Oral, Every 12 Hours Scheduled      sennosides-docusate 8.6-50 MG per tablet  Commonly known as: PERICOLACE   1 tablet, Oral, 2 Times Daily             Continue These Medications        Instructions Start Date   atorvastatin 20 MG tablet  Commonly known as: LIPITOR   20 mg, Oral, Daily      donepezil 10 MG tablet  Commonly known as: Aricept   10 mg, Oral, Nightly             Stop These Medications      oxyCODONE-acetaminophen 5-325 MG per tablet  Commonly known as: PERCOCET                Discharge Disposition:  Rehab Facility or Unit (DC - External)    Diet: continue on diet / dietary restrictions from hospitalization     Discharge Activity: advance as tolerated      Additional Instructions for the Follow-ups that You Need to Schedule       Ambulatory Referral to Neurology   As directed      3-4 week f/u for neurocognitive testing and cervical stenosis            Pertinent  and/or Most Recent Results       LAB RESULTS:      Lab 11/24/24  0748 11/20/24  0518 11/19/24  0511   WBC 8.85 8.53 8.25   HEMOGLOBIN 12.0* 11.1* 12.4*   HEMATOCRIT 36.8* 34.1* 37.9   PLATELETS 330 273 280   NEUTROS ABS  --  6.14 5.60   IMMATURE GRANS (ABS)  --  0.03 0.04   LYMPHS ABS  --  1.25 1.44   MONOS ABS  --  0.94* 0.96*   EOS ABS  --  0.15 0.16   MCV 85.8 87.4 88.3         Lab 11/24/24  0748 11/22/24  0530  11/21/24  0536 11/20/24  0518 11/19/24  0511   SODIUM 131* 132* 126* 128* 128*   POTASSIUM 4.4 4.5 4.5 4.6 4.2   CHLORIDE 98 98 94* 94* 92*   CO2 19.6* 24.8 23.7 27.2 27.3   ANION GAP 13.4 9.2 8.3 6.8 8.7   BUN 14 12 12 19 16   CREATININE 0.80 0.81 0.71* 0.78 0.80   EGFR 86.7 86.4 89.9 87.4 86.7   GLUCOSE 93 102* 88 103* 93   CALCIUM 8.9 8.8 8.9 8.7 8.8   MAGNESIUM 2.0  --   --  2.0 2.1   PHOSPHORUS 3.1  --   --  3.1 3.7   TSH  --   --   --   --  3.140         Lab 11/19/24  0511   TOTAL PROTEIN 7.0   ALBUMIN 4.0   ALT (SGPT) 7   AST (SGOT) 18   BILIRUBIN 0.8   INDIRECT BILIRUBIN 0.5   BILIRUBIN DIRECT 0.3   ALK PHOS 79         Lab 11/24/24  0748 11/21/24  0536   PROBNP  --  944.8   HSTROP T 28*  --                  Brief Urine Lab Results  (Last result in the past 365 days)        Color   Clarity   Blood   Leuk Est   Nitrite   Protein   CREAT   Urine HCG        11/21/24 1605 Dark Yellow   Cloudy   Negative   Large (3+)   Negative   Negative                 Microbiology Results (last 10 days)       ** No results found for the last 240 hours. **            RADIOLOGY:    MRI Cervical Spine Without Contrast    Result Date: 11/24/2024  Impression: Impression: Extensive degenerative changes noted throughout the cervical spine as characterized above. There is severe spinal canal stenosis with evidence of cord compression at the level of C4-C5 with associated mild cord edema/myelomalacia. Electronically Signed: Kyle Pinzon,   11/24/2024 12:33 PM EST  Workstation ID: [id number]    MRI Brain Without Contrast    Result Date: 11/24/2024  Impression: Impression: No acute intracranial abnormality. Mild to moderate periventricular and subcortical T2/FLAIR hyperintensities are nonspecific but most likely represents chronic small vessel ischemic changes. Electronically Signed: Kyle Pinzon,   11/24/2024 12:15 PM EST  Workstation ID: [id number]    XR Chest 1 View    Result Date: 11/24/2024  Impression: Impression: 1.Left  lateral eighth through 10th rib fractures are again noted. The anterior right rib fractures seen on CT are not well demonstrated on this single view chest radiograph. No pneumothorax. 2.Small left pleural effusion and peripheral opacities compatible with calcified pleural plaques are unchanged. 3.No new findings Electronically Signed: Osmar Perez DO  11/24/2024 10:58 AM EST  Workstation ID: [id number]    CT Head Without Contrast Stroke Protocol    Result Date: 11/24/2024  Impression: Impression: 1.No acute intracranial abnormality. 2.Diffuse parenchymal atrophy and extensive chronic small vessel ischemic disease is unchanged. 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 Electronically Signed: Osmar Perez DO  11/24/2024 8:10 AM EST  Workstation ID: [id number]    XR Wrist 2 View Left    Result Date: 11/20/2024  Impression: Impression: No acute fracture or malalignment Electronically Signed: Emerson Day MD  11/20/2024 12:00 PM EST  Workstation ID: [id number]    CT Chest Without Contrast Diagnostic    Result Date: 11/18/2024  Impression: Interval slight increase in the bilateral pleural effusions. No pneumothorax is seen. Multiple bilateral rib fractures are present, as described above and previously. Otherwise, no significant interval change is seen since 11/15/2024. Please see above comments for further detail.   Portions of this note were completed with a voice recognition program.  Electronically Signed By-Alexis Hernandez MD On:11/18/2024 1:15 AM      CT Abdomen Pelvis With Contrast    Result Date: 11/15/2024  Impression: Impression: 1. Bilateral rib fractures primarily involving the right ribs along the anterior costochondral junction and involving the lateral left ribs. 2. Mild to moderate right pleural effusion. Pleural and diaphragmatic calcifications are noted suggestive of prior asbestosis exposure. There is some suspected scarring and atelectasis at the right lung base. 3. Abnormal enhancement along the rectal  wall concerning for active rectal bleeding. Correlate clinically. Electronically Signed: Keshia Awan MD  11/15/2024 4:20 PM EST  Workstation ID: [id number]    CT Chest Without Contrast Diagnostic    Result Date: 11/15/2024  Impression: Impression: 1. Bilateral rib fractures primarily involving the right ribs along the anterior costochondral junction and involving the lateral left ribs. 2. Mild to moderate right pleural effusion. Pleural and diaphragmatic calcifications are noted suggestive of prior asbestosis exposure. There is some suspected scarring and atelectasis at the right lung base. 3. Abnormal enhancement along the rectal wall concerning for active rectal bleeding. Correlate clinically. Electronically Signed: Keshia Awan MD  11/15/2024 4:20 PM EST  Workstation ID: [id number]             Results for orders placed during the hospital encounter of 11/17/24    Adult Transthoracic Echo Complete W/ Cont if Necessary Per Protocol    Interpretation Summary    Left ventricular systolic function is moderately decreased. Calculated left ventricular EF = 33.7%    Left ventricular diastolic function is consistent with (grade I) impaired relaxation.    There is moderate calcification of the aortic valve.    Moderate to severe mitral valve regurgitation is present.    Estimated right ventricular systolic pressure from tricuspid regurgitation is mildly elevated (35-45 mmHg). Calculated right ventricular systolic pressure from tricuspid regurgitation is 44 mmHg.      Labs Pending at Discharge:        Time spent on Discharge including face to face service:  >35 minutes

## 2024-11-25 NOTE — NURSING NOTE
Report called to Orin Mckeon at [facility] rehab at 1330. Patient to travel by personal vehicle to [facility]. Daughter is transporting, she is at bedside. Medications reviewed and discharge instructions provided. IV removed and flex monitor removed. Patient showered per request before leaving. Confirmed with provider that patient did not need written prescriptions sent to [facility].

## 2024-11-25 NOTE — CONSULTS
Psychiatric   Neurosurgery Consult    Patient Name:Arnaldo Benoit  : 1940  MRN: [id number]  Primary Care Physician: Celina Salgado APRN  Date of admission: 2024    Subjective   Subjective     Chief Complaint: Hand numbness and spinal stenosis    History of Present Illness   Arnaldo Benoit is a 84 y.o. male with left greater than right hand numbness. Right now resolved. This prompted an MRI which shows spinal stenosis at C4-5 with possible signal change. Pt had a recent fall with rib pain.     Review of Systems   Neurological:  Positive for numbness.         Personal History     Past Medical History:   Diagnosis Date    Dementia     early    Hyperlipidemia        History reviewed. No pertinent surgical history.    Family History: His family history is not on file.     Social History: He  reports that he has quit smoking. His smoking use included cigarettes. He started smoking about 49 years ago. He has been exposed to tobacco smoke. He has never used smokeless tobacco. He reports current alcohol use. He reports that he does not use drugs.    Home Medications:  atorvastatin, donepezil, and oxyCODONE-acetaminophen    Allergies:  He is allergic to beef-derived products.    Objective    Objective     Vitals:    Temp:  [97.2 °F (36.2 °C)-97.7 °F (36.5 °C)] 97.3 °F (36.3 °C)  Heart Rate:  [51-89] 51  Resp:  [16] 16  BP: ()/(56-87) 106/64    Normal strength and sensation in the upper extremities.   Negative Hoffmans.   Awake and alert.        Result Review    Result Review:  I have personally reviewed the results from the time of this admission to 2024 11:34 EST and agree with these findings:  []  Laboratory  []  Microbiology  [x]  Radiology  []  EKG/Telemetry   []  Cardiology/Vascular   []  Pathology  []  Old records  []  Other:  Most notable findings include: Multilevel cervical spondylosis with stenosis.     Assessment & Plan   Assessment / Plan     Brief Patient Summary:  Arnaldo Benoit  is a 84 y.o. male with spinal stenosis with improving hand numbness. No signs of myelopathy on physical exam.     Active Hospital Problems:  Active Hospital Problems    Diagnosis     **Rib pain on left side     LV dysfunction     Nonrheumatic mitral valve regurgitation      Plan:   Would not recommend surgery presently. Would work on PT and f/u with any worsened or new symptoms.     VTE Prophylaxis:  Pharmacologic & mechanical VTE prophylaxis orders are present.

## 2024-11-25 NOTE — THERAPY TREATMENT NOTE
Acute Care - Physical Therapy Treatment Note   [facility]     Patient Name: Arnaldo Benoit  : 1940  MRN: [id number]  Today's Date: 2024      Visit Dx:     ICD-10-CM ICD-9-CM   1. Multiple rib fractures involving four or more ribs  S22.49XA 807.09   2. Pleural effusion  J90 511.9   3. Difficulty walking  R26.2 719.7   4. LV dysfunction  I51.9 429.9   5. Nonrheumatic mitral valve regurgitation  I34.0 424.0   6. Moderate dementia with anxiety, unspecified dementia type  F03.B4 294.21     Patient Active Problem List   Diagnosis    Skin cancer of scalp    Rib pain on left side    LV dysfunction    Nonrheumatic mitral valve regurgitation     Past Medical History:   Diagnosis Date    Dementia     early    Hyperlipidemia      History reviewed. No pertinent surgical history.  PT Assessment (Last 12 Hours)       PT Evaluation and Treatment       Row Name 24 1214          Physical Therapy Time and Intention    Subjective Information complains of;weakness  -DK     Document Type therapy note (daily note)  -DK     Mode of Treatment individual therapy;physical therapy  -DK     Patient Effort good  -DK     Symptoms Noted During/After Treatment none  -DK     Comment Pt mildly confused, [place], somewhat grumpy with PTA this session, but was able to ambulate further with less assist.  Pt was able to lobo his shoes independently, but told PTA remove them post gait.  -DK       Row Name 24 1214          Pain    Pretreatment Pain Rating 0/10 - no pain  -DK     Posttreatment Pain Rating 0/10 - no pain  -DK       Row Name 24 1214          Cognition    Affect/Mental Status (Cognition) confused;flat/blunted affect  -DK     Behavioral Issues (Cognition) overwhelmed easily;difficulty managing stress  -DK     Orientation Status (Cognition) oriented to;person;situation  -DK     Follows Commands (Cognition) physical/tactile prompts required;repetition of directions required;verbal cues/prompting required  -DK      Cognitive Function (Cognition) attention deficit  -DK     Attention Deficit (Cognition) arousal/alertness;concentration;focused/sustained attention  -DK     Personal Safety Interventions gait belt;nonskid shoes/slippers when out of bed;supervised activity  -DK       Row Name 11/25/24 1214          Transfers    Transfers sit-stand transfer;stand-sit transfer  -DK       Row Name 11/25/24 1214          Sit-Stand Transfer    Sit-Stand [place] (Transfers) standby assist;contact guard;1 person assist  -DK     Assistive Device (Sit-Stand Transfers) walker, front-wheeled  -DK       Row Name 11/25/24 1214          Stand-Sit Transfer    Stand-Sit [place] (Transfers) standby assist;contact guard;1 person assist  -DK     Assistive Device (Stand-Sit Transfers) walker, front-wheeled  -DK       Row Name 11/25/24 1214          Gait/Stairs (Locomotion)    Gait/Stairs Locomotion gait/ambulation independence;gait/ambulation assistive device;distance ambulated;gait pattern  -DK     [place] Level (Gait) standby assist;contact guard;1 person assist  -DK     Assistive Device (Gait) walker, front-wheeled  -DK     Distance in Feet (Gait) 120  -DK     Pattern (Gait) step-through  -DK     Deviations/Abnormal Patterns (Gait) jossue decreased;festinating/shuffling;gait speed decreased;stride length decreased  -DK     Bilateral Gait Deviations forward flexed posture  -DK     Comment, (Gait/Stairs) Pt ambulated on room air with a rolling walker.  He insisted on wearing his tennis shoes for gait this session.  Pt returned to bed on alert post treatment. Pt does have a tendence to walk somewhat fast, pushing the walker slightly too far ahead.  -DK       Row Name 11/25/24 1214          Safety Issues/Impairments Affecting Functional Mobility    Safety Issues Affecting Function (Mobility) awareness of need for assistance;friction/shear risk;judgment;safety precaution awareness  -DK     Impairments Affecting Function (Mobility)  balance;cognition;endurance/activity tolerance;strength  hearing  -     Cognitive Impairments, Mobility Safety/Performance impulsivity;awareness, need for assistance;judgment;safety precaution awareness  -       Row Name 11/25/24 1214          Balance    Balance Assessment sitting static balance;sitting dynamic balance;standing static balance;standing dynamic balance  -     Static Sitting Balance standby assist  -     Dynamic Sitting Balance standby assist  -     Position, Sitting Balance unsupported;sitting edge of bed  -     Static Standing Balance standby assist;contact guard;1-person assist  -DK     Dynamic Standing Balance standby assist;contact guard;1-person assist  -DK     Position/Device Used, Standing Balance walker, front-wheeled  -     Balance Interventions standing;dynamic;tandem gait  -       Row Name 11/25/24 1214          Motor Skills    Motor Skills --  therapeutic exercises  -     Therapeutic Exercise hip;knee;ankle  -       Row Name 11/25/24 1214          Hip (Therapeutic Exercise)    Hip (Therapeutic Exercise) AAROM (active assistive range of motion)  -     Hip AAROM (Therapeutic Exercise) bilateral;flexion;extension;aBduction;aDduction;supine;10 repetitions;2 sets  -       Row Name 11/25/24 1214          Knee (Therapeutic Exercise)    Knee (Therapeutic Exercise) AAROM (active assistive range of motion)  -     Knee AAROM (Therapeutic Exercise) bilateral;flexion;extension;supine;10 repetitions;2 sets  -       Row Name 11/25/24 1214          Ankle (Therapeutic Exercise)    Ankle (Therapeutic Exercise) AAROM (active assistive range of motion)  -     Ankle AAROM (Therapeutic Exercise) bilateral;dorsiflexion;plantarflexion;supine;10 repetitions;2 sets  -       Row Name             Wound 11/18/24 0435 Right upper arm    Wound - Properties Group Placement Date: 11/18/24  -JS Placement Time: 0435  -JS Side: Right  -JS Orientation: upper  -JS Location: arm  -JS    Retired  Wound - Properties Group Placement Date: 11/18/24  -JS Placement Time: 0435 -JS Side: Right  -JS Orientation: upper  -JS Location: arm  -JS    Retired Wound - Properties Group Placement Date: 11/18/24  -JS Placement Time: 0435 -JS Side: Right  -JS Orientation: upper  -JS Location: arm  -JS    Retired Wound - Properties Group Date first assessed: 11/18/24  -JS Time first assessed: 0435  -JS Side: Right  -JS Location: arm  -JS      Row Name 11/25/24 1214          Plan of Care Review    Plan of Care Reviewed With patient;family  -DK     Progress improving  -DK       Row Name 11/25/24 1214          Positioning and Restraints    Pre-Treatment Position in bed  -DK     Post Treatment Position bed  -DK     In Bed supine;call light within reach;encouraged to call for assist;exit alarm on;with family/caregiver;side rails up x2;legs elevated  -DK       Row Name 11/25/24 1214          Therapy Assessment/Plan (PT)    Rehab Potential (PT) good  -DK     Criteria for Skilled Interventions Met (PT) skilled treatment is necessary  -DK     Therapy Frequency (PT) daily  -DK     Problem List (PT) problems related to;balance;cognition;mobility;strength;hearing;communication  -DK     Activity Limitations Related to Problem List (PT) unable to ambulate safely;unable to transfer safely  -DK       Row Name 11/25/24 1214          Progress Summary (PT)    Progress Toward Functional Goals (PT) progress toward functional goals is good  -DK               User Key  (r) = Recorded By, (t) = Taken By, (c) = Cosigned By      Initials Name Provider Type    Dilma Rao PTA Physical Therapist Assistant    Cesilia Mckeon RN Registered Nurse                    Physical Therapy Education        No education to display                  PT Recommendation and Plan  Planned Therapy Interventions (PT): balance training, bed mobility training, gait training, strengthening, transfer training  Therapy Frequency (PT): daily  Progress Summary  (PT)  Progress Toward Functional Goals (PT): progress toward functional goals is good  Plan of Care Reviewed With: patient, family  Progress: improving   Outcome Measures       Row Name 11/25/24 1200             How much help from another person do you currently need...    Turning from your back to your side while in flat bed without using bedrails? 4  -DK      Moving from lying on back to sitting on the side of a flat bed without bedrails? 4  -DK      Moving to and from a bed to a chair (including a wheelchair)? 3  -DK      Standing up from a chair using your arms (e.g., wheelchair, bedside chair)? 3  -DK      Climbing 3-5 steps with a railing? 3  -DK      To walk in hospital room? 3  -DK      AM-PAC 6 Clicks Score (PT) 20  -DK         Functional Assessment    Outcome Measure Options AM-PAC 6 Clicks Basic Mobility (PT)  -DK                User Key  (r) = Recorded By, (t) = Taken By, (c) = Cosigned By      Initials Name Provider Type    Dilma Rao PTA Physical Therapist Assistant                     Time Calculation:    PT Charges       Row Name 11/25/24 1219             Time Calculation    PT Received On 11/25/24  -DK      PT Goal Re-Cert Due Date 11/27/24  -DK         Timed Charges    95207 - PT Therapeutic Exercise Minutes 14  -DK      03473 - Gait Training Minutes  8  -DK      21959 - PT Therapeutic Activity Minutes 8  -DK         Total Minutes    Timed Charges Total Minutes 30  -DK       Total Minutes 30  -DK                User Key  (r) = Recorded By, (t) = Taken By, (c) = Cosigned By      Initials Name Provider Type    Dilma Rao PTA Physical Therapist Assistant                  Therapy Charges for Today       Code Description Service Date Service Provider Modifiers Qty    05508454324 HC PT THER PROC EA 15 MIN 11/25/2024 Dilma Velazquez PTA GP 1    85275553373 HC GAIT TRAINING EA 15 MIN 11/25/2024 Dilma Velazquze PTA GP 1            PT G-Codes  Outcome Measure Options: AM-PAC 6 Clicks Basic  Mobility (PT)  AM-PAC 6 Clicks Score (PT): 21    Dilma Velazquez, PTA  11/25/2024

## 2024-11-29 LAB
QT INTERVAL: 391 MS
QTC INTERVAL: 480 MS

## 2024-12-10 ENCOUNTER — OFFICE VISIT (OUTPATIENT)
Dept: CARDIOLOGY | Facility: CLINIC | Age: 84
End: 2024-12-10
Payer: MEDICARE

## 2024-12-10 VITALS
SYSTOLIC BLOOD PRESSURE: 118 MMHG | WEIGHT: 192 LBS | HEART RATE: 86 BPM | HEIGHT: 70 IN | DIASTOLIC BLOOD PRESSURE: 83 MMHG | BODY MASS INDEX: 27.49 KG/M2

## 2024-12-10 DIAGNOSIS — I50.22 CHRONIC SYSTOLIC HEART FAILURE: Primary | ICD-10-CM

## 2024-12-10 DIAGNOSIS — R55 SYNCOPE AND COLLAPSE: ICD-10-CM

## 2024-12-10 DIAGNOSIS — I49.8 VENTRICULAR BIGEMINY: ICD-10-CM

## 2024-12-10 NOTE — PROGRESS NOTES
Chief Complaint  Follow-up    Subjective            Arnaldo Benoit presents to [facility] CARDIOLOGY  History of Present Illness    Mr. Benoit is here for follow-up.  He was previously evaluated in the clinic for orthostatic syncope, ventricular bigeminy.  More recently he presented to the ER after mechanical fall resulting in multiple rib fractures.  He had pleural effusions on CT that were small and subsequent echo showed reduced EF with moderate to severe mitral regurgitation.  He did not appear in acute heart failure during this hospitalization.  He was started on Entresto and carvedilol at discharge.  Today he states he is feeling pretty well.  He does not have any excessive shortness of breath or volume retention.  He denies chest pain.  Compliant with medical therapy.    PMH  Past Medical History:   Diagnosis Date    Dementia     early    Hyperlipidemia          SURGICALHX  History reviewed. No pertinent surgical history.     SOC  Social History     Socioeconomic History    Marital status:    Tobacco Use    Smoking status: Former     Types: Cigarettes     Start date: 1975     Passive exposure: Past    Smokeless tobacco: Never   Vaping Use    Vaping status: Never Used   Substance and Sexual Activity    Alcohol use: Yes     Comment: patient and family states occasional rum drinker. place of living and EMS states patient drinks daily.    Drug use: Never    Sexual activity: Defer         FAMHX  History reviewed. No pertinent family history.       ALLERGY  Allergies   Allergen Reactions    Beef-Derived Products Unknown - Low Severity        MEDSCURRENT    Current Outpatient Medications:     atorvastatin (LIPITOR) 20 MG tablet, Take 1 tablet by mouth Daily., Disp: 90 tablet, Rfl: 3    carvedilol (COREG) 3.125 MG tablet, Take 1 tablet by mouth 2 (Two) Times a Day With Meals for 30 days., Disp: 60 tablet, Rfl: 0    donepezil (Aricept) 10 MG tablet, Take 1 tablet by mouth Every Night., Disp:  "90 tablet, Rfl: 1    Lidocaine 4 %, Place 2 patches on the skin as directed by provider Daily. Remove & Discard patch within 12 hours or as directed by MD, Disp: , Rfl:     sacubitril-valsartan (ENTRESTO) 24-26 MG tablet, Take 0.5 tablets by mouth Every 12 (Twelve) Hours., Disp: , Rfl:     sennosides-docusate (senna-docusate sodium) 8.6-50 MG per tablet, Take 1 tablet by mouth 2 (Two) Times a Day., Disp: , Rfl:       Review of Systems   Cardiovascular:  Negative for chest pain, dyspnea on exertion, leg swelling and syncope.        Objective     /83   Pulse 86   Ht 177.8 cm (70\")   Wt 87.1 kg (192 lb)   BMI 27.55 kg/m²       General Appearance:   well developed  well nourished  HENT:   oropharynx moist  lips not cyanotic  Neck:  thyroid not enlarged  supple  Respiratory:  no respiratory distress  normal breath sounds  no rales  Cardiovascular:  no jugular venous distention  regular rhythm  apical impulse normal  S1 normal, S2 normal  no S3, no S4   Systolic murmur  no rub, no thrill  carotid pulses normal; no bruit  pedal pulses normal  lower extremity edema: none    Musculoskeletal:  no clubbing of fingers.   normocephalic, head atraumatic  Skin:   warm, dry  Psychiatric:  judgement and insight appropriate  normal mood and affect      Result Review :     The following data was reviewed by: RADHA Canales on 12/10/2024:    CMP          11/21/2024    05:36 11/22/2024    05:30 11/24/2024    07:48   CMP   Glucose 88  102  93    BUN 12  12  14    Creatinine 0.71  0.81  0.80    EGFR 89.9  86.4  86.7    Sodium 126  132  131    Potassium 4.5  4.5  4.4    Chloride 94  98  98    Calcium 8.9  8.8  8.9    BUN/Creatinine Ratio 16.9  14.8  17.5    Anion Gap 8.3  9.2  13.4      CBC          11/19/2024    05:11 11/20/2024    05:18 11/24/2024    07:48   CBC   WBC 8.25  8.53  8.85    RBC 4.29  3.90  4.29    Hemoglobin 12.4  11.1  12.0    Hematocrit 37.9  34.1  36.8    MCV 88.3  87.4  85.8    MCH 28.9  28.5  " 28.0    [facility] 32.7  32.6  32.6    RDW 15.9  16.3  16.2    Platelets 280  273  330        TSH          11/19/2024    05:11   TSH   TSH 3.140        Data reviewed : Cardiology studies Hospital records reviewed ejection fraction 33% on echo.    Procedures      Arnaldo Benoit  reports that he has quit smoking. His smoking use included cigarettes. He started smoking about 49 years ago. He has been exposed to tobacco smoke. He has never used smokeless tobacco. I have educated him on the risk of diseases from using tobacco products such as cancer, COPD, and heart disease.              Assessment and Plan        ASSESSMENT:  Encounter Diagnoses   Name Primary?    Chronic systolic heart failure Yes    Syncope and collapse     Ventricular bigeminy          PLAN:    1.  Chronic systolic heart failure, clinically compensated.  Due to history of orthostasis resulting in syncope and frequent falls, will do conservative medical management.  Continue low-dose Entresto and carvedilol.  His volume status is stable.  Will notify the office with any increase in shortness of breath, weight gain, or fluid retention.  2.  Moderate to severe mitral regurgitation, seemingly asymptomatic.  Continue to monitor clinically.  3.  Ventricular bigeminy, stable.  Asymptomatic.  Continue low-dose beta-blocker.    Routine follow-up in 6 months unless problems arise.      Patient was given instructions and counseling regarding his condition or for health maintenance advice. Please see specific information pulled into the AVS if appropriate.           Mayda Lundberg, RADHA   12/10/2024  14:20 EST   Yes

## 2024-12-15 NOTE — PLAN OF CARE
Subjective:      Primary care physician: Veronica Lyn M.D.  Referring Provider: Magdi Kline M.D.   Medical Oncologist: Magdi Kline M.D.     Chief Complaint: No chief complaint on file.    Diagnosis:   1. Abdominal pain, unspecified abdominal location        2. 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 Breast cancer, stage 4, left (HCC)        4. Carcinomatosis (HCC)            History of presenting illness:    Sol Kern is a pleasant 60 y.o. female with PMH of diabetes mellitus and recurrent/metastatic ER positive breast cancer s/p left lumpectomy with sentinel lymph node biopsy on 11/12/2021 who presented to her oncologist for worsening abdominal pain with progressive bloating who is referred for evaluation for laparoscopic biopsy of the omentum/peritoneum. PET CT on 9/17/24 noted no uptake in the sclerotic metastasis, no enlarged or hypermetabolic retroperitoneal lymph nodes, small loculated right pleural effusion and right basilar atelectasis with minimal residual uptake (SUV max 2.6), focal uptake in the perineum/lower vagina, persistent since prior (SUV max 9.9). CT ABD/PELVIS on 11/25/24 noted diffuse omental nodularity and stranding suggestive of carcinomatosis, trace right upper quadrant ascites, hepatic steatosis, hepatomegaly, and redemonstrated sclerotic osseous metastases. PET CT on 12/5/24 noted soft tissue induration in the anterior peritoneal cavity again identified similar to the recent CT scan with no associated elevated activity, no change in right lung fibrotic opacifications in loculated pleural fluid/pleural thickening slightly elevated activity, no new sites of abnormal elevated FDG activity, no change in sclerotic bone metastases with no elevated activity, and no other new evidence of abnormally elevated activity or metastases.    She is here today for  Goal Outcome Evaluation:           Progress: improving                                 "initial evaluation.  She endorses a history above.  She describes a several week history of bloating, early satiety and changes in bowel movements.  She describes her abdomen is uncomfortable.  She denies any previous abdominal operations aside from  section.  She is not currently on systemic therapy.      Past Medical History:   Diagnosis Date    Allergy     Back pain     Cancer (HCC)     breast    Diabetes (HCC)     diet    GERD (gastroesophageal reflux disease)     High cholesterol     not medicated    Hypertension     Malignant neoplasm of upper-outer quadrant of left female breast (HCC)     Neck pain     Obesity     Sleep apnea     no cpap    Snoring      Past Surgical History:   Procedure Laterality Date    PB MASTECTOMY, PARTIAL Left 2021    Procedure: MASTECTOMY, PARTIAL - SAMARA LOCALIZED;  Surgeon: Kayleen Sykes M.D.;  [facility];  Service: General    NODE BIOPSY SENTINEL Left 2021    Procedure: BIOPSY, LYMPH NODE, SENTINEL;  Surgeon: Kayleen Sykes M.D.;  [facility];  Service: General    CARPAL TUNNEL RELEASE Right     ENDOMETRIAL ABLATION      FOOT SURGERY Right     KNEE ARTHROSCOPY      PRIMARY C SECTION      2     TUBAL COAGULATION LAPAROSCOPIC BILATERAL       Allergies   Allergen Reactions    Codeine Vomiting and Nausea    Hydrocodone Vomiting and Nausea    Other Drug Vomiting and Nausea     Any of the \"cets\" (percocet)     Outpatient Encounter Medications as of 2024   Medication Sig Dispense Refill    prochlorperazine (COMPAZINE) 10 MG Tab Take 1 Tablet by mouth every 6 hours as needed for Nausea/Vomiting. 30 Tablet 3    simethicone (MYLICON) 125 MG chewable tablet Chew 250 mg every 6 hours as needed for Flatulence. 2 tablets= 250mg      lidocaine (LIDODERM) 5 % Patch Place 1 Patch on the skin every 24 hours. Place patch to affected area in am and remove at bedtime. 30 Patch 0    zolpidem (AMBIEN) 10 MG Tab Take " 10 mg by mouth at bedtime as needed for Sleep.      atorvastatin (LIPITOR) 20 MG Tab TAKE 1 TABLET BY MOUTH EVERY DAY IN THE EVENING 100 Tablet 3    Semaglutide, 2 MG/DOSE, (OZEMPIC, 2 MG/DOSE,) 8 MG/3ML Solution Pen-injector Inject 2 mg under the skin every 7 days. 9 mL 0    albuterol 108 (90 Base) MCG/ACT Aero Soln inhalation aerosol Inhale 2 Puffs every 6 hours as needed for Shortness of Breath. 8 Each 3    ondansetron (ZOFRAN ODT) 4 MG TABLET DISPERSIBLE Take 1 Tablet by mouth every 8 hours as needed for Nausea/Vomiting. 10 Tablet 0    denosumab (XGEVA) 120 MG/1.7ML injection Inject 120 mg under the skin every 28 days. (Patient not taking: Reported on 12/4/2024)      fulvestrant (FASLODEX) 250 MG/5ML Solution Prefilled Syringe injection Inject 250 mg into the shoulder, thigh, or buttocks every 90 days. (Patient not taking: Reported on 12/4/2024)      cyclobenzaprine (FLEXERIL) 10 mg Tab TAKE 1 TABLET BY MOUTH THREE TIMES A DAY AS NEEDED FOR MUSCLE SPASM (Patient taking differently: Take 10 mg by mouth 3 times a day as needed for Muscle Spasms (As needed).) 90 Tablet 0    metFORMIN ER (GLUCOPHAGE XR) 500 MG TABLET SR 24 HR Take 1 Tablet by mouth every day. (Patient taking differently: Take 1,000 mg by mouth every day.) 200 Tablet 1    lisinopril (PRINIVIL) 2.5 MG Tab Take 1 Tablet by mouth every day. 90 Tablet 1    Ribociclib Succ, 600 MG Dose, 200 MG Tablet Therapy Pack Take 600 mg by mouth see administration instructions. Take 600 mg by mouth daily for 3 weeks (21 days), off for 1 week (7 days) 63 Each 0     No facility-administered encounter medications on file as of 12/17/2024.     Social History     Socioeconomic History    Marital status:      Spouse name: Not on file    Number of children: Not on file    Years of education: Not on file    Highest education level: Associate degree: occupational, technical, or vocational program   Occupational History    Not on file   Tobacco Use    Smoking status:  Former     Current packs/day: 0.25     Average packs/day: 0.3 packs/day for 20.0 years (5.0 ttl pk-yrs)     Types: Cigarettes    Smokeless tobacco: Never    Tobacco comments:     working on quitting    Vaping Use    Vaping status: Never Used   Substance and Sexual Activity    Alcohol use: Not Currently     Comment: rarely    Drug use: Not Currently     Types: Marijuana, Oral     Comment: gummies    Sexual activity: Yes     Partners: Male     Comment:    Other Topics Concern    Not on file   Social History Narrative    Not on file     Social Drivers of Health     Financial Resource Strain: Low Risk  (3/20/2023)    Overall Financial Resource Strain (CARDIA)     Difficulty of Paying Living Expenses: Not very hard   Food Insecurity: No Food Insecurity (11/25/2024)    Hunger Vital Sign     Worried About Running Out of Food in the Last Year: Never true     Ran Out of Food in the Last Year: Never true   Transportation Needs: No Transportation Needs (11/25/2024)    PRAPARE - Transportation     Lack of Transportation (Medical): No     Lack of Transportation (Non-Medical): No   Physical Activity: Inactive (3/20/2023)    Exercise Vital Sign     Days of Exercise per Week: 0 days     Minutes of Exercise per Session: 0 min   Stress: Stress Concern Present (3/20/2023)    [employer] - Occupational Stress Questionnaire     Feeling of Stress : To some extent   Social Connections: Moderately Integrated (3/20/2023)    Social Connection and Isolation Panel [NHANES]     Frequency of Communication with Friends and Family: More than three times a week     Frequency of Social Gatherings with Friends and Family: Once a week     Attends Gnosticism Services: 1 to 4 times per year     Active Member of Clubs or Organizations: No     Attends Club or Organization Meetings: Never     Marital Status:    Intimate Partner Violence: Not At Risk (11/25/2024)    Humiliation, Afraid, Rape, and Kick questionnaire      Fear of Current or Ex-Partner: No     Emotionally Abused: No     Physically Abused: No     Sexually Abused: No   Housing Stability: Low Risk  (11/25/2024)    Housing Stability Vital Sign     Unable to Pay for Housing in the Last Year: No     Number of Times Moved in the Last Year: 0     Homeless in the Last Year: No      Social History     Tobacco Use   Smoking Status Former    Current packs/day: 0.25    Average packs/day: 0.3 packs/day for 20.0 years (5.0 ttl pk-yrs)    Types: Cigarettes   Smokeless Tobacco Never   Tobacco Comments    working on quitting      Social History     Substance and Sexual Activity   Alcohol Use Not Currently    Comment: rarely     Social History     Substance and Sexual Activity   Drug Use Not Currently    Types: Marijuana, Oral    Comment: gummies      Family History   Problem Relation Age of Onset    Cancer Mother         melanoma    Hypertension Mother     Other Mother         THYROID    Heart Disease Mother         valvular disease    Ovarian Cancer Mother     Heart Disease Father         Heart Attack    Heart Attack Father     Other Father         psoriasis    Hyperlipidemia Brother     Other Brother         psoriasis    Heart Disease Brother         MI    Heart Attack Brother         massive heart attack     Heart Disease Maternal Grandfather         MI    Breast Cancer Other     Other Other         psoriasis    Diabetes Neg Hx     Alcohol/Drug Neg Hx     Tubal Cancer Neg Hx     Peritoneal Cancer Neg Hx     Colorectal Cancer Neg Hx        Review of Systems   Gastrointestinal:  Positive for abdominal pain, constipation, diarrhea and nausea.   All other systems reviewed and are negative.       Objective:   There were no vitals taken for this visit.    Physical Exam  Vitals and nursing note reviewed.   Constitutional:       Appearance: Normal appearance.   HENT:      Head: Normocephalic and atraumatic.      Nose: Nose normal.      Mouth/Throat:      Mouth: Mucous membranes are dry.    Eyes:      Extraocular Movements: Extraocular movements intact.      Conjunctiva/sclera: Conjunctivae normal.      Pupils: Pupils are equal, round, and reactive to light.   Cardiovascular:      Rate and Rhythm: Normal rate and regular rhythm.   Pulmonary:      Effort: Pulmonary effort is normal.   Abdominal:      General: There is distension.      Palpations: Abdomen is soft.      Tenderness: There is no abdominal tenderness.   Musculoskeletal:         General: Normal range of motion.      Cervical back: Normal range of motion and neck supple.   Skin:     General: Skin is warm and dry.      Capillary Refill: Capillary refill takes less than 2 seconds.   Neurological:      General: No focal deficit present.      Mental Status: She is alert and oriented to person, place, and time.   Psychiatric:         Mood and Affect: Mood normal.         Labs   Latest Reference Range & Units 11/26/24 01:29   WBC 4.8 - 10.8 K/uL 6.0   RBC 4.20 - 5.40 M/uL 4.00 (L)   Hemoglobin 12.0 - 16.0 g/dL 13.9   Hematocrit 37.0 - 47.0 % 40.9   MCV 81.4 - 97.8 fL 102.3 (H)   MCH 27.0 - 33.0 pg 34.8 (H)   MCHC 32.2 - 35.5 g/dL 34.0   RDW 35.9 - 50.0 fL 50.9 (H)   Platelet Count 164 - 446 K/uL 238   MPV 9.0 - 12.9 fL 8.8 (L)   (L): Data is abnormally low  (H): Data is abnormally high     Latest Reference Range & Units 11/26/24 01:29 12/04/24 15:20   Sodium 135 - 145 mmol/L 140    Potassium 3.6 - 5.5 mmol/L 3.6    Chloride 96 - 112 mmol/L 104    Co2 20 - 33 mmol/L 25    Anion Gap 7.0 - 16.0  11.0    Glucose 65 - 99 mg/dL 126 (H)    Bun 8 - 22 mg/dL 10    Creatinine 0.50 - 1.40 mg/dL 0.63    GFR (CKD-EPI) >60 mL/min/1.73 m 2 100    Calcium 8.5 - 10.5 mg/dL 9.1    Correct Calcium 8.5 - 10.5 mg/dL 9.5    AST(SGOT) 12 - 45 U/L 22    ALT(SGPT) 2 - 50 U/L 22    Alkaline Phosphatase 30 - 99 U/L 66    Total Bilirubin 0.1 - 1.5 mg/dL 0.2    Albumin 3.2 - 4.9 g/dL 3.5    Total Protein 6.0 - 8.2 g/dL 6.3    Globulin 1.9 - 3.5 g/dL 2.8    A-G Ratio g/dL 1.3     Phosphorus 2.5 - 4.5 mg/dL 3.8    Magnesium 1.5 - 2.5 mg/dL 2.0    Lipase 11 - 82 U/L 420 (H) 36   (H): Data is abnormally high     Imaging  PET CT 12/5/24  IMPRESSION:  1.  Soft tissue induration in the anterior peritoneal cavity is again identified which appears similar to the recent CT scan. There is no associated elevated activity. However, peritoneal carcinomatosis is a possibility.  2.  No change in right lung fibrotic opacifications in loculated pleural fluid/pleural thickening slightly elevated activity.  3.  No new sites of abnormal elevated FDG activity are identified.  4.  No change in sclerotic bone metastases. No elevated activity noted as on the prior PET/CT.  5.  No other new evidence of abnormally elevated activity or metastases are identified.    CT ABD/PELVIS 11/25/24  IMPRESSION:  1.  Diffuse omental nodularity and stranding suggestive of carcinomatosis.  2.  Trace right upper quadrant ascites.  3.  Hepatic steatosis.  4.  Hepatomegaly.  5.  Redemonstrated sclerotic osseous metastases.    PET CT 9/17/24  IMPRESSION:  1. No uptake in the sclerotic metastasis.  2. No enlarged or hypermetabolic retroperitoneal lymph nodes.  3. Small loculated right pleural effusion and right basilar atelectasis with minimal residual uptake (SUV max 2.6).  4. There is focal uptake in the perineum/lower vagina, persistent since prior (SUV max 9.9). This could be physiologic but given the persistent nature of the uptake, direct visualization or MRI is recommended.    Pathology  N/A    Procedures  N/A    Diagnosis:     1. Abdominal pain, unspecified abdominal location        2. 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 Breast cancer, stage 4, left (HCC)        4. Carcinomatosis (HCC)            Medical Decision Making:  Today's Assessment / Status / Plan:     60-year-old woman with metastatic ER positive breast cancer, now presenting with abdominal pain and  distention.  New findings on CT abdomen pelvis suggest omental/peritoneal thickening.  This thickening is not PET avid.  In order to obtain diagnosis, Dr. Kline has requested consideration for diagnostic laparoscopy.  I think this is reasonable as it could reinitiate systemic therapy for this patient.  Diagnostic laparoscopy with washings and biopsy were discussed with the patient. Indications, risk benefits and alternatives were explained.  All questions by patient and her daughter were answered.    Will book for OR, as soon as possible.      Tasneem Gonzalez MD, MS  Surgical Oncology  [facility]        I, Dr. Gonzalez, have entered, reviewed and confirmed the above diagnoses related to this patient on this date of service, as per the time and date noted at top of this note.

## 2025-01-03 RX ORDER — SACUBITRIL AND VALSARTAN 24; 26 MG/1; MG/1
0.5 TABLET, FILM COATED ORAL DAILY
Qty: 9 TABLET | Refills: 10 | Status: SHIPPED | OUTPATIENT
Start: 2025-01-03

## 2025-01-03 RX ORDER — CARVEDILOL 3.12 MG/1
TABLET ORAL
Qty: 60 TABLET | Refills: 10 | Status: SHIPPED | OUTPATIENT
Start: 2025-01-03

## 2025-01-15 NOTE — TELEPHONE ENCOUNTER
Caller: CESAR BURTON    Relationship: Emergency Contact    Best call back number: [phone]     Requested Prescriptions:   Requested Prescriptions     Pending Prescriptions Disp Refills    carvedilol (COREG) 3.125 MG tablet 60 tablet 10    sacubitril-valsartan (Entresto) 24-26 MG tablet 9 tablet 10     Sig: Take 0.5 tablets by mouth Daily.    atorvastatin (LIPITOR) 20 MG tablet 90 tablet 3     Sig: Take 1 tablet by mouth Daily.    donepezil (Aricept) 10 MG tablet 90 tablet 1     Sig: Take 1 tablet by mouth Every Night.        Pharm[facility] where request should be sent: [facility] [place] [facility] [place] [facility] [address] [facility] [address] - [phone] [facility] [phone] FX     Last office visit with prescribing clinician: 8/21/2024   Last telemedicine visit with prescribing clinician: Visit date not found   Next office visit with prescribing clinician: 2/21/2025       Does the patient have less than a 3 day supply:  [x] Yes  [] No    Would you like a call back once the refill request has been completed: [] Yes [] No    If the office needs to give you a call back, can they leave a voicemail: [] Yes [] No    Tanner Childress Rep   01/15/25 16:13 EST

## 2025-01-16 RX ORDER — SACUBITRIL AND VALSARTAN 24; 26 MG/1; MG/1
0.5 TABLET, FILM COATED ORAL DAILY
Qty: 9 TABLET | Refills: 10 | OUTPATIENT
Start: 2025-01-16

## 2025-01-16 RX ORDER — ATORVASTATIN CALCIUM 20 MG/1
20 TABLET, FILM COATED ORAL DAILY
Qty: 90 TABLET | Refills: 3 | Status: SHIPPED | OUTPATIENT
Start: 2025-01-16

## 2025-01-16 RX ORDER — DONEPEZIL HYDROCHLORIDE 10 MG/1
10 TABLET, FILM COATED ORAL NIGHTLY
Qty: 90 TABLET | Refills: 1 | Status: SHIPPED | OUTPATIENT
Start: 2025-01-16

## 2025-01-16 RX ORDER — CARVEDILOL 3.12 MG/1
TABLET ORAL
Qty: 60 TABLET | Refills: 10 | OUTPATIENT
Start: 2025-01-16

## 2025-01-16 NOTE — TELEPHONE ENCOUNTER
carvedilol (COREG) 3.125 MG tablet  sacubitril-valsartan (Entresto) 24-26 MG tablet      Waiting for the above medications to be sent over to the pharmacy. Need to go over to the main pharmacy number one, for all medications.     [facility] [place] - [address] - [phone]  - [phone]  [phone]

## 2025-01-17 ENCOUNTER — TELEPHONE (OUTPATIENT)
Dept: CARDIOLOGY | Facility: CLINIC | Age: 85
End: 2025-01-17
Payer: COMMERCIAL

## 2025-01-17 ENCOUNTER — TELEPHONE (OUTPATIENT)
Dept: INTERNAL MEDICINE | Age: 85
End: 2025-01-17
Payer: COMMERCIAL

## 2025-01-17 RX ORDER — CARVEDILOL 3.12 MG/1
3.12 TABLET ORAL 2 TIMES DAILY WITH MEALS
Qty: 180 TABLET | Refills: 3 | Status: SHIPPED | OUTPATIENT
Start: 2025-01-17

## 2025-01-17 NOTE — TELEPHONE ENCOUNTER
Patients daughter came in and stated that the entresto sent in is too expensive and they are needing something else. Please advise. Goes to [facility] on [address].

## 2025-01-17 NOTE — TELEPHONE ENCOUNTER
Caller: CESAR BURTON    Relationship: Emergency Contact    Best call back number: [phone]     Which medication are you concerned about: BILLIESTO    Who prescribed you this medication: FRANCOISE ULRICH    When did you start taking this medication: UNKNOWN    What are your concerns: PATIENT HAS RECENTLY FOUND OUT THAT ENTRESTO IS NOW GOING TO COST $1,000 A MONTH AND PATIENT WOULD LIKE TO GET SOMETHING THAT IS MORE AFFORDABLE. PLEASE CONTACT PATIENTS DAUGHTER TO ADVISE. THANK YOU!    How long have you had these concerns:

## 2025-01-20 ENCOUNTER — TELEPHONE (OUTPATIENT)
Dept: CARDIOLOGY | Facility: CLINIC | Age: 85
End: 2025-01-20

## 2025-01-20 NOTE — PROGRESS NOTES
Subjective   The ABCs of the Annual Wellness Visit  Medicare Wellness Visit      Arnaldo Benoit is a 84 y.o. patient who presents for a Medicare Wellness Visit.    The following portions of the patient's history were reviewed and   updated as appropriate: He  has a past medical history of Dementia and Hyperlipidemia.  He does not have any pertinent problems on file.  He  has no past surgical history on file.  His family history is not on file.  He  reports that he quit smoking about 47 years ago. His smoking use included cigarettes. He started smoking about 50 years ago. He has a 1.5 pack-year smoking history. He has been exposed to tobacco smoke. He has never used smokeless tobacco. He reports current alcohol use. He reports that he does not use drugs.  Current Outpatient Medications   Medication Sig Dispense Refill    atorvastatin (LIPITOR) 20 MG tablet Take 1 tablet by mouth Daily. 90 tablet 3    carvedilol (COREG) 3.125 MG tablet Take 1 tablet by mouth 2 (Two) Times a Day With Meals. 180 tablet 3    donepezil (Aricept) 10 MG tablet Take 1 tablet by mouth Every Night. 90 tablet 3    Entresto 24-26 MG tablet TAKE 1/2 TABLET BY MOUTH TWICE DAILY 9 tablet 10     No current facility-administered medications for this visit.     Current Outpatient Medications on File Prior to Visit   Medication Sig    carvedilol (COREG) 3.125 MG tablet Take 1 tablet by mouth 2 (Two) Times a Day With Meals.    Entresto 24-26 MG tablet TAKE 1/2 TABLET BY MOUTH TWICE DAILY    [DISCONTINUED] atorvastatin (LIPITOR) 20 MG tablet Take 1 tablet by mouth Daily.    [DISCONTINUED] donepezil (Aricept) 10 MG tablet Take 1 tablet by mouth Every Night.    [DISCONTINUED] Lidocaine 4 % Place 2 patches on the skin as directed by provider Daily. Remove & Discard patch within 12 hours or as directed by MD (Patient not taking: Reported on 1/22/2025)    [DISCONTINUED] sennosides-docusate (senna-docusate sodium) 8.6-50 MG per tablet Take 1 tablet by mouth 2  (Two) Times a Day.     No current facility-administered medications on file prior to visit.     He is allergic to beef-derived drug products..    Compared to one year ago, the patient's physical   health is the same.  Compared to one year ago, the patient's mental   health is the same.    Recent Hospitalizations:  This patient has had a [facility] admission record on file within the last 365 days.  Current Medical Providers:  Patient Care Team:  Celina Salgado APRN as PCP - General (Nurse Practitioner)  Robe Lay MD as Consulting Physician (Dermatology)  PARVIZ Larios MD as Consulting Physician (Cardiology)    Outpatient Medications Prior to Visit   Medication Sig Dispense Refill    carvedilol (COREG) 3.125 MG tablet Take 1 tablet by mouth 2 (Two) Times a Day With Meals. 180 tablet 3    Entresto 24-26 MG tablet TAKE 1/2 TABLET BY MOUTH TWICE DAILY 9 tablet 10    atorvastatin (LIPITOR) 20 MG tablet Take 1 tablet by mouth Daily. 90 tablet 3    donepezil (Aricept) 10 MG tablet Take 1 tablet by mouth Every Night. 90 tablet 1    Lidocaine 4 % Place 2 patches on the skin as directed by provider Daily. Remove & Discard patch within 12 hours or as directed by MD (Patient not taking: Reported on 1/22/2025)      Stimulant Laxative 8.6-50 MG per tablet TAKE ONE TABLET BY MOUTH TWICE DAILY HOLD FOR DIARRHEA (Patient not taking: Reported on 1/22/2025) 60 tablet 10     No facility-administered medications prior to visit.     No opioid medication identified on active medication list. I have reviewed chart for other potential  high risk medication/s and harmful drug interactions in the elderly.      Aspirin is not on active medication list.  Aspirin use is not indicated based on review of current medical condition/s. Risk of harm outweighs potential benefits.  .    Patient Active Problem List   Diagnosis    Skin cancer of scalp    Rib pain on left side    LV dysfunction    Nonrheumatic mitral valve regurgitation  "    Advance Care Planning Advance Directive is not on file.  ACP discussion was held with the patient during this visit. Patient does not have an advance directive, declines further assistance.            Objective   Vitals:    25 1257   BP: 110/65   BP Location: Left arm   Patient Position: Sitting   Cuff Size: Large Adult   Pulse: 63   Resp: 16   Temp: 96.9 °F (36.1 °C)   TempSrc: Temporal   SpO2: 98%   Weight: 88.3 kg (194 lb 9.6 oz)   Height: 180.3 cm (71\")   PainSc: 0-No pain       Estimated body mass index is 27.14 kg/m² as calculated from the following:    Height as of this encounter: 180.3 cm (71\").    Weight as of this encounter: 88.3 kg (194 lb 9.6 oz).                Does the patient have evidence of cognitive impairment? Yes                                                                                                Health  Risk Assessment    Smoking Status:  Social History     Tobacco Use   Smoking Status Former    Current packs/day: 0.00    Average packs/day: 0.5 packs/day for 3.0 years (1.5 ttl pk-yrs)    Types: Cigarettes    Start date:     Quit date:     Years since quittin.0    Passive exposure: Past   Smokeless Tobacco Never     Alcohol Consumption:  Social History     Substance and Sexual Activity   Alcohol Use Yes    Comment: patient and family states occasional rum drinker. place of living and EMS states patient drinks daily.       Fall Risk Screen  STEADI Fall Risk Assessment was completed, and patient is at LOW risk for falls.Assessment completed on:2025    Depression Screening   Little interest or pleasure in doing things? Not at all   Feeling down, depressed, or hopeless? Not at all   PHQ-2 Total Score 0      Health Habits and Functional and Cognitive Screenin/22/2025    12:58 PM   Functional & Cognitive Status   Do you have difficulty preparing food and eating? No   Do you have difficulty bathing yourself, getting dressed or grooming yourself? No   Do you " have difficulty using the toilet? No   Do you have difficulty moving around from place to place? No   Do you have trouble with steps or getting out of a bed or a chair? No   Current Diet Well Balanced Diet   Dental Exam Not up to date   Eye Exam Not up to date   Exercise (times per week) 7 times per week   Current Exercises Include Aerobics   Do you need help using the phone?  No   Are you deaf or do you have serious difficulty hearing?  Yes   Do you need help to go to places out of walking distance? No   Do you need help shopping? No   Do you need help preparing meals?  No   Do you need help with housework?  No   Do you need help with laundry? No   Do you need help taking your medications? No   Do you need help managing money? No   Do you ever drive or ride in a car without wearing a seat belt? No   Have you felt unusual stress, anger or loneliness in the last month? No   Who do you live with? Community   If you need help, do you have trouble finding someone available to you? No   Have you been bothered in the last four weeks by sexual problems? No   Do you have difficulty concentrating, remembering or making decisions? Yes           Age-appropriate Screening Schedule:  Refer to the list below for future screening recommendations based on patient's age, sex and/or medical conditions. Orders for these recommended tests are listed in the plan section. The patient has been provided with a written plan.    Health Maintenance List  Health Maintenance   Topic Date Due    LIPID PANEL  Never done    TDAP/TD VACCINES (1 - Tdap) Never done    ZOSTER VACCINE (1 of 2) Never done    RSV Vaccine - Adults (1 - 1-dose 75+ series) Never done    INFLUENZA VACCINE  Never done    COVID-19 Vaccine (1 - 2024-25 season) Never done    BMI FOLLOWUP  01/31/2025    ANNUAL WELLNESS VISIT  01/22/2026    Pneumococcal Vaccine 65+  Completed                                                                                                                                                 CMS Preventative Services Quick Reference  Risk Factors Identified During Encounter  Immunizations Discussed/Encouraged: Tdap, Influenza, Prevnar 20 (Pneumococcal 20-valent conjugate), Shingrix, COVID19, and RSV (Respiratory Syncytial Virus)    The above risks/problems have been discussed with the patient.  Pertinent information has been shared with the patient in the After Visit Summary.  An After Visit Summary and PPPS were made available to the patient.    Follow Up:   Next Medicare Wellness visit to be scheduled in 1 year.          Additional E&M Note during same encounter follows:  Patient has multiple medical problems which are significant and separately identifiable that require additional work above and beyond the Medicare Wellness Visit.      Chief Complaint  Medicare Wellness-subsequent    Arnaldo Benoit is a 84 y.o. male who presents to [facility]     History of Present Illness  The patient is an 84-year-old male who presents for a Medicare wellness exam and follow-up. He is accompanied by his daughter.    His daughter reports that he has been faring well, with a preference for colder weather. He resides at [facility], where he received his influenza vaccine last year. His daughter inquires about the necessity of fasting for upcoming blood work. She also mentions that he underwent some blood tests in November 2024, which were not ordered by this provider. He was hospitalized last month, during which additional blood tests were conducted. He expresses a desire to lose weight.    He is currently on a regimen of atorvastatin 20 mg for cholesterol management. His daughter is awaiting a response from Dr. Larios regarding a potential change in his medication due to financial constraints. She was informed that he may be eligible for free medication for a year, given his income level. If no response is received today, she plans to follow up  "tomorrow.    He continues to take Aricept for dementia or forgetfulness, although he has been without this medication for the past 3 days. His daughter is considering a dose increase.    He is under the care of a cardiologist, Dr. Larios, for chronic systolic heart failure. He is on Entresto. He has been informed that he is not a surgical candidate. His shortness of breath has improved since starting his current cardiac medications.    He has moderate-to-mild mitral valve regurgitation but remains asymptomatic.    He has ventricular bigeminy, for which he is on a low-dose beta-blocker.    MEDICATIONS  atorvastatin, Aricept, Entresto    IMMUNIZATIONS  He had an influenza vaccine last year.    Objective   Vital Signs:   Vitals:    01/22/25 1257   BP: 110/65   BP Location: Left arm   Patient Position: Sitting   Cuff Size: Large Adult   Pulse: 63   Resp: 16   Temp: 96.9 °F (36.1 °C)   TempSrc: Temporal   SpO2: 98%   Weight: 88.3 kg (194 lb 9.6 oz)   Height: 180.3 cm (71\")   PainSc: 0-No pain       Wt Readings from Last 3 Encounters:   01/22/25 88.3 kg (194 lb 9.6 oz)   12/10/24 87.1 kg (192 lb)   11/25/24 85.2 kg (187 lb 13.3 oz)     BP Readings from Last 3 Encounters:   01/22/25 110/65   12/10/24 118/83   11/25/24 109/60       Physical Exam  Vitals reviewed.   Constitutional:       General: He is not in acute distress.  HENT:      Head: Normocephalic and atraumatic.   Eyes:      Conjunctiva/sclera: Conjunctivae normal.   Cardiovascular:      Rate and Rhythm: Normal rate and regular rhythm.      Heart sounds: Normal heart sounds.   Pulmonary:      Effort: Pulmonary effort is normal.      Breath sounds: Normal breath sounds. No wheezing, rhonchi or rales.   Abdominal:      General: There is no distension.      Palpations: Abdomen is soft. There is no mass.      Tenderness: There is no abdominal tenderness.   Musculoskeletal:      Right lower leg: No edema.      Left lower leg: No edema.   Lymphadenopathy:      Cervical: " No cervical adenopathy.   Skin:     General: Skin is warm and dry.   Neurological:      General: No focal deficit present.      Mental Status: He is alert.   Psychiatric:         Mood and Affect: Mood normal.         Thought Content: Thought content normal.         The following data was reviewed by RADHA Grant on 01/22/2025  Common Labs   Common labs          11/21/2024    05:36 11/22/2024    05:30 11/24/2024    07:48   Common Labs   Glucose 88  102  93    BUN 12  12  14    Creatinine 0.71  0.81  0.80    Sodium 126  132  131    Potassium 4.5  4.5  4.4    Chloride 94  98  98    Calcium 8.9  8.8  8.9    WBC   8.85    Hemoglobin   12.0    Hematocrit   36.8    Platelets   330        Results  Laboratory Studies  Blood sugar level was normal. Kidney function was good. Liver function was good.        Assessment & Plan   Diagnoses and all orders for this visit:    1. Encounter for annual wellness exam in Medicare patient (Primary)    2. LV dysfunction  -     Ambulatory Referral to Chronic Care Management    3. Mitral valve insufficiency, unspecified etiology    4. Ventricular bigeminy    5. Dementia without behavioral disturbance, psychotic disturbance, mood disturbance, or anxiety, unspecified dementia severity, unspecified dementia type    6. Hyperlipidemia, unspecified hyperlipidemia type  -     Ambulatory Referral to Chronic Care Management    7. Vitamin D deficiency    8. IFG (impaired fasting glucose)    9. Anemia, unspecified type    10. Need for pneumococcal vaccination  -     Pneumococcal Conjugate Vaccine 20-Valent (PCV20)    Other orders  -     donepezil (Aricept) 10 MG tablet; Take 1 tablet by mouth Every Night.  Dispense: 90 tablet; Refill: 3  -     atorvastatin (LIPITOR) 20 MG tablet; Take 1 tablet by mouth Daily.  Dispense: 90 tablet; Refill: 3        Assessment & Plan  1. Medicare wellness examination.  His blood glucose levels, kidney function, and liver function are all within normal parameters.  He will receive the pneumonia and influenza vaccines today. A referral for the chronic care management program will be initiated. Fasting labs, including cholesterol levels, will be ordered for May or June 2025. A printout of his medication list will be provided.    2. Chronic systolic heart failure.  He is currently on Entresto. He is not a candidate for surgery. The cardiologist will be consulted for any medication changes.    3. Moderate-to-mild mitral valve regurgitation.  He is asymptomatic.    4. Ventricular bigeminy.  He is on a low-dose beta-blocker.    5. Dementia.  A refill for Aricept will be sent to the pharmacy.     6. Hypercholesterolemia.  He is currently on atorvastatin 20 mg. A prescription for atorvastatin will be sent to the pharmacy. Fasting labs, including cholesterol levels, will be ordered for May or June 2025.       FOLLOW UP  Return in about 6 months (around 7/22/2025) for Recheck.  Patient was given instructions and counseling regarding his condition or for health maintenance advice. Please see specific information pulled into the AVS if appropriate.     Patient or patient representative verbalized consent for the use of Ambient Listening during the visit with  RADHA Grant for chart documentation. 1/22/2025  13:14 RADHA Caraballo  01/22/25  16:03 CARLITO

## 2025-01-20 NOTE — TELEPHONE ENCOUNTER
Caller: CESAR BURTON    Relationship: Emergency Contact    Best call back number: [phone]    What is the best time to reach you: ANYTIME     Who are you requesting to speak with (clinical staff, provider,  specific staff member): CLINICAL         What was the call regarding: PATIENT WOULD LIKE TO SEE IF IF COULD BE SWITCHED TO SOMETHING DIFFERENT. THE COST OF THE ENTRESTO MEDICATION IS JUST TOO EXPENSIVE.

## 2025-01-21 NOTE — TELEPHONE ENCOUNTER
Yes, there are alternatives however Entresto has shown the most benefit.  Ideally the patient will apply for cost assistance and we can provide samples in the meantime if they are available.  If cost assistance is not approved, then could definitely consider alternative therapies.  Let me know if this is okay with patient/daughter.

## 2025-01-21 NOTE — TELEPHONE ENCOUNTER
Spoke to pt's daughter.  I presented the opportunity for pt assistance through Entresto.  She mentioned that the pharmacy told her there were other medications the pt could be on besides the Entresto. She wanted to inquire about that before having to fill out the paperwork for assistance.    Please advise, thank you.

## 2025-01-22 ENCOUNTER — OFFICE VISIT (OUTPATIENT)
Dept: INTERNAL MEDICINE | Age: 85
End: 2025-01-22
Payer: MEDICARE

## 2025-01-22 VITALS
SYSTOLIC BLOOD PRESSURE: 110 MMHG | TEMPERATURE: 96.9 F | RESPIRATION RATE: 16 BRPM | WEIGHT: 194.6 LBS | HEART RATE: 63 BPM | HEIGHT: 71 IN | OXYGEN SATURATION: 98 % | DIASTOLIC BLOOD PRESSURE: 65 MMHG | BODY MASS INDEX: 27.24 KG/M2

## 2025-01-22 DIAGNOSIS — F03.90 DEMENTIA WITHOUT BEHAVIORAL DISTURBANCE, PSYCHOTIC DISTURBANCE, MOOD DISTURBANCE, OR ANXIETY, UNSPECIFIED DEMENTIA SEVERITY, UNSPECIFIED DEMENTIA TYPE: ICD-10-CM

## 2025-01-22 DIAGNOSIS — Z23 NEED FOR PNEUMOCOCCAL VACCINATION: ICD-10-CM

## 2025-01-22 DIAGNOSIS — I49.8 VENTRICULAR BIGEMINY: ICD-10-CM

## 2025-01-22 DIAGNOSIS — I51.9 LV DYSFUNCTION: ICD-10-CM

## 2025-01-22 DIAGNOSIS — R73.01 IFG (IMPAIRED FASTING GLUCOSE): ICD-10-CM

## 2025-01-22 DIAGNOSIS — I34.0 MITRAL VALVE INSUFFICIENCY, UNSPECIFIED ETIOLOGY: ICD-10-CM

## 2025-01-22 DIAGNOSIS — E55.9 VITAMIN D DEFICIENCY: ICD-10-CM

## 2025-01-22 DIAGNOSIS — Z00.00 ENCOUNTER FOR ANNUAL WELLNESS EXAM IN MEDICARE PATIENT: Primary | ICD-10-CM

## 2025-01-22 DIAGNOSIS — E78.5 HYPERLIPIDEMIA, UNSPECIFIED HYPERLIPIDEMIA TYPE: ICD-10-CM

## 2025-01-22 DIAGNOSIS — D64.9 ANEMIA, UNSPECIFIED TYPE: ICD-10-CM

## 2025-01-22 PROCEDURE — G0439 PPPS, SUBSEQ VISIT: HCPCS | Performed by: NURSE PRACTITIONER

## 2025-01-22 PROCEDURE — 99214 OFFICE O/P EST MOD 30 MIN: CPT | Performed by: NURSE PRACTITIONER

## 2025-01-22 PROCEDURE — G0009 ADMIN PNEUMOCOCCAL VACCINE: HCPCS | Performed by: NURSE PRACTITIONER

## 2025-01-22 PROCEDURE — 1160F RVW MEDS BY RX/DR IN RCRD: CPT | Performed by: NURSE PRACTITIONER

## 2025-01-22 PROCEDURE — 1126F AMNT PAIN NOTED NONE PRSNT: CPT | Performed by: NURSE PRACTITIONER

## 2025-01-22 PROCEDURE — 1170F FXNL STATUS ASSESSED: CPT | Performed by: NURSE PRACTITIONER

## 2025-01-22 PROCEDURE — 90677 PCV20 VACCINE IM: CPT | Performed by: NURSE PRACTITIONER

## 2025-01-22 PROCEDURE — 1159F MED LIST DOCD IN RCRD: CPT | Performed by: NURSE PRACTITIONER

## 2025-01-22 PROCEDURE — G2211 COMPLEX E/M VISIT ADD ON: HCPCS | Performed by: NURSE PRACTITIONER

## 2025-01-22 RX ORDER — DOCUSATE SODIUM 50 MG AND SENNOSIDES 8.6 MG 8.6; 5 MG/1; MG/1
TABLET, FILM COATED ORAL
Qty: 60 TABLET | Refills: 10 | Status: SHIPPED | OUTPATIENT
Start: 2025-01-22 | End: 2025-01-22

## 2025-01-22 RX ORDER — ATORVASTATIN CALCIUM 20 MG/1
20 TABLET, FILM COATED ORAL DAILY
Qty: 90 TABLET | Refills: 3 | Status: SHIPPED | OUTPATIENT
Start: 2025-01-22

## 2025-01-22 RX ORDER — DONEPEZIL HYDROCHLORIDE 10 MG/1
10 TABLET, FILM COATED ORAL NIGHTLY
Qty: 90 TABLET | Refills: 3 | Status: SHIPPED | OUTPATIENT
Start: 2025-01-22

## 2025-01-22 NOTE — TELEPHONE ENCOUNTER
SW pt's daughter.  She is willing to try for pt asst.  She is bringing pw and fin docs to the office.  We are going to provide samples of Entresto until a determination is made.  She voiced understanding.  Awaiting pw to scan in chart and fax.

## 2025-01-23 RX ORDER — SACUBITRIL AND VALSARTAN 24; 26 MG/1; MG/1
0.5 TABLET, FILM COATED ORAL 2 TIMES DAILY
Qty: 28 TABLET | Refills: 0 | COMMUNITY
Start: 2025-01-23

## 2025-01-27 NOTE — TELEPHONE ENCOUNTER
Pt's daughter filled out the necessary mac for assistance with Entresto.  Scanned in chart and faxed to [facility].

## 2025-01-28 ENCOUNTER — REFERRAL TRIAGE (OUTPATIENT)
Dept: CASE MANAGEMENT | Facility: OTHER | Age: 85
End: 2025-01-28
Payer: COMMERCIAL

## 2025-01-30 ENCOUNTER — PATIENT OUTREACH (OUTPATIENT)
Dept: CASE MANAGEMENT | Facility: OTHER | Age: 85
End: 2025-01-30
Payer: COMMERCIAL

## 2025-01-30 DIAGNOSIS — E78.00 HIGH CHOLESTEROL: ICD-10-CM

## 2025-01-30 DIAGNOSIS — I51.9 LV DYSFUNCTION: Primary | ICD-10-CM

## 2025-01-30 DIAGNOSIS — I34.0 NONRHEUMATIC MITRAL VALVE REGURGITATION: ICD-10-CM

## 2025-01-30 NOTE — OUTREACH NOTE
AMBULATORY CASE MANAGEMENT NOTE    Names and Relationships of Patient/Support Persons: Contact: NUPURZELDAKRISTEN CHURCH; Relationship: Emergency Contact -     CCM Interim Update  Spoke with Daughter Kristen who is patient POA. She consented to [facility] program for HLD and dementia education, med management and med assistance.     Patient lives in [place], has dementia but not on memory care floor. Most memory issues are related to short term, not long term at this time.     Patient medication list reviewed, Xiomy at Cardiology is working on Entresto PAP.     Patient had [facility] a couple months ago, is not being seen by them right now. Patient has Adv Dir, Kristen is POA .     Send Education  Questions/Answers      Flowsheet Row Most Recent Value   Advanced Directives: Patient Has            Education Documentation  unresolved/worsening symptoms, taught by Eleonora Hammonds RN at 1/30/2025 10:30 AM.  Learner: Family  Readiness: Eager  Method: Explanation  Response: Verbalizes Understanding    sleep/rest, taught by Eleonora Hammonds RN at 1/30/2025 10:30 AM.  Learner: Family  Readiness: Eager  Method: Explanation  Response: Verbalizes Understanding    provider follow-up, taught by Eleonora Hammonds RN at 1/30/2025 10:30 AM.  Learner: Family  Readiness: Eager  Method: Explanation  Response: Verbalizes Understanding    medication management, taught by Eleonora Hammonds RN at 1/30/2025 10:30 AM.  Learner: Family  Readiness: Eager  Method: Explanation  Response: Verbalizes Understanding    long-term care, taught by Eleonora Hammonds RN at 1/30/2025 10:30 AM.  Learner: Family  Readiness: Eager  Method: Explanation  Response: Verbalizes Understanding    home safety, taught by Eleonora Hammonds RN at 1/30/2025 10:30 AM.  Learner: Family  Readiness: Eager  Method: Explanation  Response: Verbalizes Understanding    food/fluid intake, taught by Eleonora Hammonds RN at 1/30/2025 10:30 AM.  Learner: Family  Readiness:  Eager  Method: Explanation  Response: Verbalizes Understanding    fall prevention, taught by Eleonora Hammonds RN at 1/30/2025 10:30 AM.  Learner: Family  Readiness: Eager  Method: Explanation  Response: Verbalizes Understanding    coping with confusion, taught by Eleonora Hammonds RN at 1/30/2025 10:30 AM.  Learner: Family  Readiness: Eager  Method: Explanation  Response: Verbalizes Understanding    activity, taught by Eleonora Hammonds RN at 1/30/2025 10:30 AM.  Learner: Family  Readiness: Eager  Method: Explanation  Response: Verbalizes Understanding    signs/symptoms, taught by Eleonora Hammonds RN at 1/30/2025 10:30 AM.  Learner: Family  Readiness: Eager  Method: Explanation  Response: Verbalizes Understanding    risk factors, taught by Eleonora Hammonds RN at 1/30/2025 10:30 AM.  Learner: Family  Readiness: Eager  Method: Explanation  Response: Verbalizes Understanding    Be Safe, be Independent: Reducing Your Risk of Falls, taught by Eleonora Hammonds RN at 1/30/2025 10:30 AM.  Learner: Family  Readiness: Eager  Method: Explanation  Response: Verbalizes Understanding    Understanding Your Risk for Falls, taught by Eleonora Hammonds RN at 1/30/2025 10:30 AM.  Learner: Family  Readiness: Eager  Method: Explanation  Response: Verbalizes Understanding    Fall Prevention in the Home, Adult, taught by Eleonora Hammonds RN at 1/30/2025 10:30 AM.  Learner: Family  Readiness: Eager  Method: Explanation  Response: Verbalizes Understanding    Unresolved/Worsening Symptoms, taught by Eleonora Hammonds RN at 1/30/2025 10:30 AM.  Learner: Family  Readiness: Eager  Method: Explanation  Response: Verbalizes Understanding    Safety, taught by Eleonora Hammonds RN at 1/30/2025 10:30 AM.  Learner: Family  Readiness: Eager  Method: Explanation  Response: Verbalizes Understanding    Medication Management, taught by Eleonora Hammonds RN at 1/30/2025 10:30 AM.  Learner: Family  Readiness: Eager  Method:  Explanation  Response: Verbalizes Understanding    Home Safety, taught by Eleonora Hammonds RN at 1/30/2025 10:30 AM.  Learner: Family  Readiness: Eager  Method: Explanation  Response: Verbalizes Understanding    Activity, taught by Eleonora Hammonds RN at 1/30/2025 10:30 AM.  Learner: Family  Readiness: Eager  Method: Explanation  Response: Verbalizes Understanding    Risk Factors, taught by Eleonora Hammonds RN at 1/30/2025 10:30 AM.  Learner: Family  Readiness: Eager  Method: Explanation  Response: Verbalizes Understanding      SDOH updated and reviewed with the patient during this program:  --     Alcohol Use: Not At Risk (1/30/2025)    AUDIT-C     Frequency of Alcohol Consumption: Never     Average Number of Drinks: Patient does not drink     Frequency of Binge Drinking: Never      --     Depression: Not at risk (1/22/2025)    PHQ-2     PHQ-2 Score: 0      --     Disabilities: At Risk (11/18/2024)    Disabilities     Concentrating, Remembering, or Making Decisions Difficulty: yes     Doing Errands Independently Difficulty: no        Financial Resource Strain: Low Risk  (1/30/2025)    Overall Financial Resource Strain (CARDIA)     Difficulty of Paying Living Expenses: Not very hard      --     Food Insecurity: No Food Insecurity (1/30/2025)    Hunger Vital Sign     Worried About Running Out of Food in the Last Year: Never true     Ran Out of Food in the Last Year: Never true      --     Health Literacy: Unknown (11/18/2024)    Education     Preferred Language: English      --     Housing Stability: Unknown (1/30/2025)    Housing Stability Vital Sign     Unable to Pay for Housing in the Last Year: No     Homeless in the Last Year: No      --     Interpersonal Safety: Not At Risk (1/30/2025)    Humiliation, Afraid, Rape, and Kick questionnaire     Fear of Current or Ex-Partner: No     Emotionally Abused: No     Physically Abused: No     Sexually Abused: No      --     Physical Activity: Inactive (1/30/2025)     Exercise Vital Sign     Days of Exercise per Week: 0 days     Minutes of Exercise per Session: 0 min      --     Social Connections: Moderately Isolated (1/30/2025)    Social Connection and Isolation Panel [NHANES]     Frequency of Communication with Friends and Family: More than three times a week     Frequency of Social Gatherings with Friends and Family: More than three times a week     Attends Restorationism Services: 1 to 4 times per year     Active Member of Clubs or Organizations: No     Attends Club or Organization Meetings: Never     Marital Status:       --     Stress: No Stress Concern Present (1/30/2025)    [employer] - Occupational Stress Questionnaire     Feeling of Stress : Not at all      --     Tobacco Use: Medium Risk (1/22/2025)    Patient History     Smoking Tobacco Use: Former     Smokeless Tobacco Use: Never     Passive Exposure: Past      --     Transportation Needs: No Transportation Needs (1/30/2025)    PRAPARE - Transportation     Lack of Transportation (Medical): No     Lack of Transportation (Non-Medical): No      --     Utilities: Not At Risk (1/30/2025)    [facility] Utilities     Threatened with loss of utilities: Stella BEASLEY  Ambulatory Case Management    1/30/2025, 10:46 EST

## 2025-01-31 ENCOUNTER — PATIENT OUTREACH (OUTPATIENT)
Dept: CASE MANAGEMENT | Facility: OTHER | Age: 85
End: 2025-01-31
Payer: COMMERCIAL

## 2025-01-31 DIAGNOSIS — I51.9 LV DYSFUNCTION: Primary | ICD-10-CM

## 2025-01-31 DIAGNOSIS — I34.0 NONRHEUMATIC MITRAL VALVE REGURGITATION: ICD-10-CM

## 2025-01-31 DIAGNOSIS — E78.00 HIGH CHOLESTEROL: ICD-10-CM

## 2025-01-31 NOTE — OUTREACH NOTE
[facility] End of Month Documentation    This Chronic Medical Management Care Plan for Arnaldo Benoit, 84 y.o. male, has been established; reviewed; monitored and managed and a new plan of care implemented for the month of January.  A cumulative time of 35  minutes was spent on this patient record this month, including chart review.    Regarding the patient's problems: has Skin cancer of scalp; Rib pain on left side; LV dysfunction; and Nonrheumatic mitral valve regurgitation on their problem list., the following items were addressed: medical records; medications and any changes can be found within the plan section of the note.  A detailed listing of time spent for chronic care management is tracked within each outreach encounter.  Current medications include:  has a current medication list which includes the following prescription(s): atorvastatin, carvedilol, donepezil, and entresto. and the patient is reported to be patient is compliant with medication protocol,  Medications are reported to be effective.  Regarding these diagnoses, referrals were made to the following provider(s):  NA.  All notes on chart for PCP to review.    The patient was monitored remotely for activity level; medications; pain; mood & behavior.    The patient's physical needs include:  needs assistance with ADLs; medication education; help taking medications as prescribed.     The patient's mental support needs include:  continued support    The patient's cognitive support needs include:  needs assistance with ADLs; requires supervision    The patient's psychosocial support needs include:  medication management or adherence    The patient's functional needs include: medication education    The patient's environmental needs include:  not applicable    Care Plan overall comments:  No data recorded    Refer to previous outreach notes for more information on the areas listed above.    Monthly Billing Diagnoses  (I51.9) LV dysfunction    (I34.0)  Nonrheumatic mitral valve regurgitation    (E78.00) High cholesterol    Medications   Medications have been reconciled    Care Plan progress this month:      Recently Modified Care Plans Updates made since 12/31/2024 12:00 AM      No recently modified care plans.             Dementia       Care for the Caregiver       Start:  01/30/25    Expected End:  07/30/25         My Caregiver Care To Do List       Start:  01/30/25         Why is this important?  People who care for a person with dementia often feel stress.  Too much stress can be harmful to you and your loved one.  You may feel depressed, exhausted, have trouble sleeping or have health problems.  You need to take care of yourself and reach out for help when you need it.  There are some things that can be done to keep the quality of your life satisfying.              Keeping Myself/My Loved One Safe       Start:  01/30/25    Expected End:  07/30/25         My Safety To Do List       Start:  01/30/25         Why is this important?  Safety is important when you or your loved one has dementia.  Eyesight, hearing and changes in feelings of hot and cold or depth-perception may occur.  Wandering or getting lost may happen.  You/your loved one may have to stop driving.  Taking steps to improve safety can prevent injuries.  It will also help you/your loved one feel more relaxed.  It will help you/your loved one be independent for a longer time.              Prevent Falls       Start:  01/30/25    Expected End:  07/30/25         My Falls Prevention To Do List       Start:  01/30/25         Why is this important?  There may be trouble with balance and getting around. Falls can happen.              Planning for Long-Term Care       Start:  01/30/25    Expected End:  07/30/25         My Long-Term Care To Do List       Start:  01/30/25         Why is this important?  Learning that you or your loved one has dementia can be scary and stressful.  You can reduce stress by  planning.  Preparing for the future is one of the most important things to do.  Thinking about how much care you/your loved one will need and how much it will cost is not easy.  Early on, you/your loved one can be part of making decisions for the future.  Making sure that your/your loved one's wishes for care are known is important.              Optimal Care Coordination of a Patient Experiencing Dementia       Start:  01/30/25    Expected End:  07/30/25         Develop Strategies to Manage Behavior       Start:  01/30/25            Recognize and Manage Caregiver Stress       Start:  01/30/25            Facilitate Optimal Mental Processes       Start:  01/30/25            Identify and Reduce Risks for Harm or Injury       Start:  01/30/25            Facilitate Activities of Long-Term Care Planning       Start:  01/30/25            Alleviate Barriers to Optimal Nutrition       Start:  01/30/25            Develop Strategies to Manage Mood and Emotion       Start:  01/30/25            Support Psychosocial Adjustment to Dementia Diagnosis       Start:  01/30/25            Facilitate Optimal Social and Functional Skills       Start:  01/30/25              Fall Risk       Prevent Falls and Injury       Start:  01/30/25    Expected End:  07/30/25         My Fall Prevention To Do List       Start:  01/30/25         Why is this important?  Most falls happen when it is hard for you to walk safely. Your balance may be off because of an illness. You may have pain in your knees, hip or other joints.  You may be overly tired or taking medicines that make you sleepy. You may not be able to see or hear clearly.  Falls can lead to broken bones, bruises or other injuries.  There are things you can do to help prevent falling.              Optimal Care Coordination of a Patient Experiencing Fall Risk       Start:  01/30/25    Expected End:  07/30/25         Identify and Manage Contributors to Fall Risk       Start:  01/30/25                 Current Specialty Plan of Care Status signed by both patient and provider    Instructions   Patient was provided an electronic copy of care plan  CCM services were explained and offered and patient has accepted these services.  Patient has given their written consent to receive CCM services and understands that this includes the authorization of electronic communication of medical information with the other treating providers.  Patient understands that they may stop CCM services at any time and these changes will be effective at the end of the calendar month and will effectively revocate the agreement of CCM services.  Patient understands that only one practitioner can furnish and be paid for CCM services during one calendar month.  Patient also understands that there may be co-payment or deductible fees in association with CCM services.  Patient will continue with at least monthly follow-up calls with the Ambulatory .    Eleonora BEASLEY  Ambulatory Case Management    1/31/2025, 10:14 EST

## 2025-02-03 ENCOUNTER — TELEPHONE (OUTPATIENT)
Dept: CARDIOLOGY | Facility: CLINIC | Age: 85
End: 2025-02-03
Payer: COMMERCIAL

## 2025-02-03 NOTE — TELEPHONE ENCOUNTER
Caller: CESAR BURTON    Relationship: Emergency Contact    Best call back number: [phone]    What is the best time to reach you: ANY    Who are you requesting to speak with (clinical staff, provider,  specific staff member): JASBIR      What was the call regarding: CALLING TO CHECK ON STATUS OF PATIENT ASSISTANCE

## 2025-02-06 ENCOUNTER — TELEPHONE (OUTPATIENT)
Dept: CARDIOLOGY | Facility: CLINIC | Age: 85
End: 2025-02-06
Payer: COMMERCIAL

## 2025-02-06 NOTE — TELEPHONE ENCOUNTER
The [facility] received a fax that requires your attention. The document has been indexed to the patient’s chart for your review.      Reason for sending: EXTERNAL MEDICAL RECORD NOTIFICATION     Documents Description: INCOMPLETE [facility]-2.6.25    Name of Sender: [facility] TYRELL     Date Indexed: 2.6.25

## 2025-02-06 NOTE — TELEPHONE ENCOUNTER
HAVE NOT REC'D A DECISION YET SO PW WAS REFAXED TO [employer] [facility] [employer].  I WILL CALL PT'S DAUGHTER TO LET HER KNOW.

## 2025-02-06 NOTE — TELEPHONE ENCOUNTER
PT WAS DENIED FOR [employer] ASST. DUE TO NOT PROVIDING LETTER OF DENIAL FOR EXTRA HELP WITH MEDICARE.  I SPOKE TO PT'S DAUGHTER WHO SAID HE DOES NOT QUALIFY FOR THE EXTRA HELP. SHE WAS HESITANT AT FIRST TO APPLY FOR PT ASST AND IS NOW STATING SHE IS NOT ABLE TO GATHER ADD'L DOCUMENTS FOR HIM.    SHE WOULD LIKE FOR PT TO BE SWITCHED TO A MORE AFFORDABLE MEDICATION.     PLEASE ADVISE, THANK YOU.

## 2025-02-07 RX ORDER — LISINOPRIL 5 MG/1
5 TABLET ORAL DAILY
Qty: 90 TABLET | Refills: 3 | Status: SHIPPED | OUTPATIENT
Start: 2025-02-07

## 2025-02-07 NOTE — TELEPHONE ENCOUNTER
Will start lisinopril in its place.  Have them monitor patient's blood pressure, I did start the lisinopril at a low dose but he has a tendency for low blood pressure and orthostasis.  Please also notify them that they we will need to wait 72 hours after last Entresto dose before starting lisinopril. Thank you

## 2025-02-07 NOTE — TELEPHONE ENCOUNTER
I called Eugenie to see what I could do on our end to expedite the process for pt asst or to make it easier for pt's daughter.  Eugenie said the daughter would need to call and submit the req'd documentation for proof of income since pt does not file taxes.  Relayed this to pt's daughter.  She did not want to proceed due to her own health circumstances and not knowing the extent of the pw she would have to get.    Relayed to the daughter the instructions on starting the Lisinopril and monitoring bp.  Pt is in asst living but the daughter voiced understanding.     Daughter asked that I call the pharmacy to let them know he needed to wait 72 hours after his last dose of Entresto before starting Lisinopril.  I spoke to Rossana at [facility] and relayed this info.  (Pt gets all his medication / pills packaged)

## 2025-02-10 ENCOUNTER — PATIENT OUTREACH (OUTPATIENT)
Dept: CASE MANAGEMENT | Facility: OTHER | Age: 85
End: 2025-02-10
Payer: COMMERCIAL

## 2025-02-10 DIAGNOSIS — I51.9 LV DYSFUNCTION: Primary | ICD-10-CM

## 2025-02-10 DIAGNOSIS — E78.00 HIGH CHOLESTEROL: ICD-10-CM

## 2025-02-10 DIAGNOSIS — I34.0 NONRHEUMATIC MITRAL VALVE REGURGITATION: ICD-10-CM

## 2025-02-10 NOTE — OUTREACH NOTE
AMBULATORY CASE MANAGEMENT NOTE    Names and Relationships of Patient/Support Persons: Contact: CESAR BURTON; Relationship: Emergency Contact -       [facility] Interim Update  Patient is going to have meds changed. Will not be on entresto. Patient to start next week with having med in the am then bp checked 2 hours after medication. Patient will have to have done again at night. Cesar stated she is trying to determine who will be doing this at [facility] for him.     Per Dr Larios note, paperwork was refaxed on the 3rd and has incomplete items. Continuing to work with [employer] to get meds approved    Agreeable to follow up call in one week.     Education Documentation  No documentation found.        Eleonora BEASLEY  Ambulatory Case Management    2/10/2025, 12:40 EST

## 2025-02-17 ENCOUNTER — PATIENT OUTREACH (OUTPATIENT)
Dept: CASE MANAGEMENT | Facility: OTHER | Age: 85
End: 2025-02-17
Payer: COMMERCIAL

## 2025-02-17 DIAGNOSIS — I51.9 LV DYSFUNCTION: Primary | ICD-10-CM

## 2025-02-17 DIAGNOSIS — I34.0 NONRHEUMATIC MITRAL VALVE REGURGITATION: ICD-10-CM

## 2025-02-17 DIAGNOSIS — E78.00 HIGH CHOLESTEROL: ICD-10-CM

## 2025-02-17 NOTE — OUTREACH NOTE
AMBULATORY CASE MANAGEMENT NOTE    Names and Relationships of Patient/Support Persons: Contact: CESAR BURTON; Relationship: Emergency Contact -     [facility] Interim Update  Patient doing well, will have BP's checked 2 times daily starting tomorrow for 2 weeks. Cesar stated she has her procedure tomorrow and is trying to get through it, then be able to continue to focus on her dad.     RN discussed follow up and any needs, encouraged to call this RN with questions or concerns. She stated she will call and has number.     Will follow up in two weeks.         Education Documentation  No documentation found.        Eleonora BEASLEY  Ambulatory Case Management    2/17/2025, 13:30 EST

## 2025-02-28 ENCOUNTER — PATIENT OUTREACH (OUTPATIENT)
Dept: CASE MANAGEMENT | Facility: OTHER | Age: 85
End: 2025-02-28
Payer: COMMERCIAL

## 2025-02-28 DIAGNOSIS — E78.00 HIGH CHOLESTEROL: ICD-10-CM

## 2025-02-28 DIAGNOSIS — I34.0 NONRHEUMATIC MITRAL VALVE REGURGITATION: ICD-10-CM

## 2025-02-28 DIAGNOSIS — I51.9 LV DYSFUNCTION: Primary | ICD-10-CM

## 2025-02-28 NOTE — OUTREACH NOTE
[facility] End of Month Documentation    This Chronic Medical Management Care Plan for Arnaldo Benoit, 84 y.o. male, has been monitored and managed and a new plan of care implemented for the month of February.  A cumulative time of 28  minutes was spent on this patient record this month, including chart review; phone call with relative.    Regarding the patient's problems: has Skin cancer of scalp; Rib pain on left side; LV dysfunction; and Nonrheumatic mitral valve regurgitation on their problem list., the following items were addressed: medical records; medications and any changes can be found within the plan section of the note.  A detailed listing of time spent for chronic care management is tracked within each outreach encounter.  Current medications include:  has a current medication list which includes the following prescription(s): atorvastatin, carvedilol, donepezil, and lisinopril. and the patient is reported to be patient is compliant with medication protocol,  Medications are reported to be effective.  Regarding these diagnoses, referrals were made to the following provider(s):  NA.  All notes on chart for PCP to review.    The patient was monitored remotely for blood pressure; activity level; mood & behavior; pain; medications.    The patient's physical needs include:  needs assistance with ADLs; medication education; help taking medications as prescribed.     The patient's mental support needs include:  continued support    The patient's cognitive support needs include:  needs assistance with ADLs; requires supervision    The patient's psychosocial support needs include:  medication management or adherence    The patient's functional needs include: medication education    The patient's environmental needs include:  not applicable    Care Plan overall comments:  No data recorded    Refer to previous outreach notes for more information on the areas listed above.    Monthly Billing Diagnoses  (I51.9) LV  dysfunction    (I34.0) Nonrheumatic mitral valve regurgitation    (E78.00) High cholesterol    Medications   Medications have been reconciled    Care Plan progress this month:      Recently Modified Care Plans Updates made since 1/28/2025 12:00 AM      No recently modified care plans.             Dementia       Care for the Caregiver       Start:  01/30/25    Expected End:  07/30/25         My Caregiver Care To Do List       Start:  01/30/25         Why is this important?  People who care for a person with dementia often feel stress.  Too much stress can be harmful to you and your loved one.  You may feel depressed, exhausted, have trouble sleeping or have health problems.  You need to take care of yourself and reach out for help when you need it.  There are some things that can be done to keep the quality of your life satisfying.              Keeping Myself/My Loved One Safe       Start:  01/30/25    Expected End:  07/30/25         My Safety To Do List       Start:  01/30/25         Why is this important?  Safety is important when you or your loved one has dementia.  Eyesight, hearing and changes in feelings of hot and cold or depth-perception may occur.  Wandering or getting lost may happen.  You/your loved one may have to stop driving.  Taking steps to improve safety can prevent injuries.  It will also help you/your loved one feel more relaxed.  It will help you/your loved one be independent for a longer time.              Prevent Falls       Start:  01/30/25    Expected End:  07/30/25         My Falls Prevention To Do List       Start:  01/30/25         Why is this important?  There may be trouble with balance and getting around. Falls can happen.              Planning for Long-Term Care       Start:  01/30/25    Expected End:  07/30/25         My Long-Term Care To Do List       Start:  01/30/25         Why is this important?  Learning that you or your loved one has dementia can be scary and stressful.  You can  reduce stress by planning.  Preparing for the future is one of the most important things to do.  Thinking about how much care you/your loved one will need and how much it will cost is not easy.  Early on, you/your loved one can be part of making decisions for the future.  Making sure that your/your loved one's wishes for care are known is important.              Optimal Care Coordination of a Patient Experiencing Dementia       Start:  01/30/25    Expected End:  07/30/25         Develop Strategies to Manage Behavior       Start:  01/30/25            Recognize and Manage Caregiver Stress       Start:  01/30/25            Facilitate Optimal Mental Processes       Start:  01/30/25            Identify and Reduce Risks for Harm or Injury       Start:  01/30/25            Facilitate Activities of Long-Term Care Planning       Start:  01/30/25            Alleviate Barriers to Optimal Nutrition       Start:  01/30/25            Develop Strategies to Manage Mood and Emotion       Start:  01/30/25            Support Psychosocial Adjustment to Dementia Diagnosis       Start:  01/30/25            Facilitate Optimal Social and Functional Skills       Start:  01/30/25              Fall Risk       Prevent Falls and Injury       Start:  01/30/25    Expected End:  07/30/25         My Fall Prevention To Do List       Start:  01/30/25         Why is this important?  Most falls happen when it is hard for you to walk safely. Your balance may be off because of an illness. You may have pain in your knees, hip or other joints.  You may be overly tired or taking medicines that make you sleepy. You may not be able to see or hear clearly.  Falls can lead to broken bones, bruises or other injuries.  There are things you can do to help prevent falling.              Optimal Care Coordination of a Patient Experiencing Fall Risk       Start:  01/30/25    Expected End:  07/30/25         Identify and Manage Contributors to Fall Risk       Start:   01/30/25              Instructions   Patient was provided an electronic copy of care plan  CCM services were explained and offered and patient has accepted these services.  Patient has given their written consent to receive CCM services and understands that this includes the authorization of electronic communication of medical information with the other treating providers.  Patient understands that they may stop CCM services at any time and these changes will be effective at the end of the calendar month and will effectively revocate the agreement of CCM services.  Patient understands that only one practitioner can furnish and be paid for CCM services during one calendar month.  Patient also understands that there may be co-payment or deductible fees in association with CCM services.  Patient will continue with at least monthly follow-up calls with the Ambulatory .    Eleonora BEASLEY  Ambulatory Case Management    2/28/2025, 11:53 EST

## 2025-03-03 ENCOUNTER — PATIENT OUTREACH (OUTPATIENT)
Dept: CASE MANAGEMENT | Facility: OTHER | Age: 85
End: 2025-03-03
Payer: COMMERCIAL

## 2025-03-03 DIAGNOSIS — E78.00 HIGH CHOLESTEROL: ICD-10-CM

## 2025-03-03 DIAGNOSIS — I34.0 NONRHEUMATIC MITRAL VALVE REGURGITATION: ICD-10-CM

## 2025-03-03 DIAGNOSIS — I51.9 LV DYSFUNCTION: Primary | ICD-10-CM

## 2025-03-03 NOTE — OUTREACH NOTE
AMBULATORY CASE MANAGEMENT NOTE    Names and Relationships of Patient/Support Persons: Contact: CESAR BURTON; Relationship: Emergency Contact -     [facility] Interim Update  Daughter had procedure completed, waiting for results. Had a lot of blood draws done, unable to understand labs online.     Patient doing pretty good, happy at current location. He has not started his meds, will  tomorrow and take to him at [place]. He will be moving apartments from further down to closer if he wants to. Patient gets tired with moving. BP's have been normal range for right now.  is supposed to be seeing patient and will draw any labs patient may need as long as they have orders according to Cesar.     Patient has been changed over to a med instead and the patient doesn't need PAP at this time. Dr Larios is still following for patient.    Agreeable to follow up phone call next week         Education Documentation  No documentation found.        Eleonora BEASLEY  Ambulatory Case Management    3/3/2025, 12:40 EST

## 2025-03-06 ENCOUNTER — TELEPHONE (OUTPATIENT)
Dept: CARDIOLOGY | Facility: CLINIC | Age: 85
End: 2025-03-06
Payer: COMMERCIAL

## 2025-03-06 NOTE — TELEPHONE ENCOUNTER
----- Message from Suzy Miranda sent at 3/6/2025 10:07 AM EST -----  Please let patient know that Dr Larios reviewed BP log and overall it looks good, would continue the current medication and follow-up plan  ----- Message -----  From: PARVIZ Larios MD  Sent: 3/6/2025   9:55 AM EST  To: RADHA Cardoso    Reviewed, overall looks good would continue the current medication and follow-up plan  ----- Message -----  From: Suzy Miranda APRN  Sent: 3/6/2025   8:33 AM EST  To: PARVIZ Larios MD      ----- Message -----  From: Gardenia Pastor RegSched Rep  Sent: 3/5/2025  10:20 AM EST  To: RADHA Cardoso

## 2025-03-06 NOTE — TELEPHONE ENCOUNTER
Per Suzy:  Please let patient know that Dr Larios reviewed BP log and overall it looks good, would continue the current medication and follow-up plan          Spoke with patient's daughter, Kristen, verbalized understanding.

## 2025-03-10 ENCOUNTER — TELEPHONE (OUTPATIENT)
Dept: CASE MANAGEMENT | Facility: OTHER | Age: 85
End: 2025-03-10
Payer: COMMERCIAL

## 2025-03-10 NOTE — TELEPHONE ENCOUNTER
No answer, unable to leave message.     Will try again in 1 week.     Eleonora SCOTT RN  Ambulatory

## 2025-03-17 ENCOUNTER — TELEPHONE (OUTPATIENT)
Dept: CASE MANAGEMENT | Facility: OTHER | Age: 85
End: 2025-03-17
Payer: COMMERCIAL

## 2025-03-31 ENCOUNTER — PATIENT OUTREACH (OUTPATIENT)
Dept: CASE MANAGEMENT | Facility: OTHER | Age: 85
End: 2025-03-31
Payer: COMMERCIAL

## 2025-03-31 DIAGNOSIS — I51.9 LV DYSFUNCTION: Primary | ICD-10-CM

## 2025-03-31 DIAGNOSIS — I34.0 NONRHEUMATIC MITRAL VALVE REGURGITATION: ICD-10-CM

## 2025-03-31 DIAGNOSIS — E78.00 HIGH CHOLESTEROL: ICD-10-CM

## 2025-03-31 NOTE — OUTREACH NOTE
[facility] End of Month Documentation    This Chronic Medical Management Care Plan for Arnaldo Benoit, 84 y.o. male, has been monitored and managed; reviewed; revised and a new plan of care implemented for the month of March.  A cumulative time of 21  minutes was spent on this patient record this month, including chart review; phone call with relative.    Regarding the patient's problems: has Skin cancer of scalp; Rib pain on left side; LV dysfunction; and Nonrheumatic mitral valve regurgitation on their problem list., the following items were addressed: medical records; medications and any changes can be found within the plan section of the note.  A detailed listing of time spent for chronic care management is tracked within each outreach encounter.  Current medications include:  has a current medication list which includes the following prescription(s): atorvastatin, carvedilol, donepezil, and lisinopril. and the patient is reported to be patient is compliant with medication protocol,  Medications are reported to be effective.  Regarding these diagnoses, referrals were made to the following provider(s):  NA.  All notes on chart for PCP to review.    The patient was monitored remotely for blood pressure; activity level; mood & behavior; medications.    The patient's physical needs include:  needs assistance with ADLs; medication education; help taking medications as prescribed.     The patient's mental support needs include:  continued support    The patient's cognitive support needs include:  needs assistance with ADLs; requires supervision    The patient's psychosocial support needs include:  medication management or adherence    The patient's functional needs include: medication education    The patient's environmental needs include:  not applicable    Care Plan overall comments:  No data recorded    Refer to previous outreach notes for more information on the areas listed above.    Monthly Billing Diagnoses  (I51.9)  LV dysfunction    (I34.0) Nonrheumatic mitral valve regurgitation    (E78.00) High cholesterol    Medications   Medications have been reconciled    Care Plan progress this month:      Recently Modified Care Plans Updates made since 2/28/2025 12:00 AM      No recently modified care plans.             Dementia       Care for the Caregiver (Progressing)       Start:  01/30/25    Expected End:  07/30/25         My Caregiver Care To Do List       Start:  01/30/25         Why is this important?  People who care for a person with dementia often feel stress.  Too much stress can be harmful to you and your loved one.  You may feel depressed, exhausted, have trouble sleeping or have health problems.  You need to take care of yourself and reach out for help when you need it.  There are some things that can be done to keep the quality of your life satisfying.              Keeping Myself/My Loved One Safe (Progressing)       Start:  01/30/25    Expected End:  07/30/25         My Safety To Do List       Start:  01/30/25         Why is this important?  Safety is important when you or your loved one has dementia.  Eyesight, hearing and changes in feelings of hot and cold or depth-perception may occur.  Wandering or getting lost may happen.  You/your loved one may have to stop driving.  Taking steps to improve safety can prevent injuries.  It will also help you/your loved one feel more relaxed.  It will help you/your loved one be independent for a longer time.              Prevent Falls (Progressing)       Start:  01/30/25    Expected End:  07/30/25         My Falls Prevention To Do List       Start:  01/30/25         Why is this important?  There may be trouble with balance and getting around. Falls can happen.              Planning for Long-Term Care (Progressing)       Start:  01/30/25    Expected End:  07/30/25         My Long-Term Care To Do List       Start:  01/30/25         Why is this important?  Learning that you or your  loved one has dementia can be scary and stressful.  You can reduce stress by planning.  Preparing for the future is one of the most important things to do.  Thinking about how much care you/your loved one will need and how much it will cost is not easy.  Early on, you/your loved one can be part of making decisions for the future.  Making sure that your/your loved one's wishes for care are known is important.              Optimal Care Coordination of a Patient Experiencing Dementia (Progressing)       Start:  01/30/25    Expected End:  07/30/25         Develop Strategies to Manage Behavior       Start:  01/30/25            Recognize and Manage Caregiver Stress       Start:  01/30/25            Facilitate Optimal Mental Processes       Start:  01/30/25            Identify and Reduce Risks for Harm or Injury       Start:  01/30/25            Facilitate Activities of Long-Term Care Planning       Start:  01/30/25            Alleviate Barriers to Optimal Nutrition       Start:  01/30/25            Develop Strategies to Manage Mood and Emotion       Start:  01/30/25            Support Psychosocial Adjustment to Dementia Diagnosis       Start:  01/30/25            Facilitate Optimal Social and Functional Skills       Start:  01/30/25              Fall Risk       Prevent Falls and Injury (Progressing)       Start:  01/30/25    Expected End:  07/30/25         My Fall Prevention To Do List       Start:  01/30/25         Why is this important?  Most falls happen when it is hard for you to walk safely. Your balance may be off because of an illness. You may have pain in your knees, hip or other joints.  You may be overly tired or taking medicines that make you sleepy. You may not be able to see or hear clearly.  Falls can lead to broken bones, bruises or other injuries.  There are things you can do to help prevent falling.              Optimal Care Coordination of a Patient Experiencing Fall Risk (Progressing)       Start:   01/30/25    Expected End:  07/30/25         Identify and Manage Contributors to Fall Risk       Start:  01/30/25                Instructions   Patient was provided an electronic copy of care plan  CCM services were explained and offered and patient has accepted these services.  Patient has given their written consent to receive CCM services and understands that this includes the authorization of electronic communication of medical information with the other treating providers.  Patient understands that they may stop CCM services at any time and these changes will be effective at the end of the calendar month and will effectively revocate the agreement of CCM services.  Patient understands that only one practitioner can furnish and be paid for CCM services during one calendar month.  Patient also understands that there may be co-payment or deductible fees in association with CCM services.  Patient will continue with at least monthly follow-up calls with the Ambulatory .    Eleonora BEASLEY  Ambulatory Case Management    3/31/2025, 11:22 EDT

## 2025-04-01 ENCOUNTER — PATIENT OUTREACH (OUTPATIENT)
Dept: CASE MANAGEMENT | Facility: OTHER | Age: 85
End: 2025-04-01
Payer: COMMERCIAL

## 2025-04-01 DIAGNOSIS — I51.9 LV DYSFUNCTION: Primary | ICD-10-CM

## 2025-04-01 DIAGNOSIS — I34.0 NONRHEUMATIC MITRAL VALVE REGURGITATION: ICD-10-CM

## 2025-04-01 DIAGNOSIS — E78.00 HIGH CHOLESTEROL: ICD-10-CM

## 2025-04-01 NOTE — OUTREACH NOTE
AMBULATORY CASE MANAGEMENT NOTE    Names and Relationships of Patient/Support Persons: Contact: JOSEPHINECESAR; Relationship: Emergency Contact -     [facility] Interim Update  Cesar stated patient doing good, BP is holding well with his new medication lisinopril. Patient is still confused at times, and doesn't understand why daughter isn't able to see him due to illness.     Cesar stated she doesn't want to worry her dad about her illness, as she's waiting for the biopsy results. Cesar stated her daughter lives in [place] and is the second POA for the patient. Cesar is working to figure out what the best plan is for the patient as her condition worsens.     RN discussed follow up call next week and patient verbalized agreement.         Education Documentation  No documentation found.        Eleonora BEASLEY  Ambulatory Case Management    4/1/2025, 09:21 EDT

## 2025-04-15 ENCOUNTER — PATIENT OUTREACH (OUTPATIENT)
Dept: CASE MANAGEMENT | Facility: OTHER | Age: 85
End: 2025-04-15
Payer: COMMERCIAL

## 2025-04-15 DIAGNOSIS — E78.00 HIGH CHOLESTEROL: ICD-10-CM

## 2025-04-15 DIAGNOSIS — I51.9 LV DYSFUNCTION: Primary | ICD-10-CM

## 2025-04-15 NOTE — OUTREACH NOTE
AMBULATORY CASE MANAGEMENT NOTE    Names and Relationships of Patient/Support Persons: Contact: CESAR BURTON; Relationship: Emergency Contact -     [facility] Interim Update  Patient daughter stated patient is doing well with meds, no side effects. Patient remains at long term and getting meds as ordered daily. Cesar has been able to speak with patient, denies any falls or issues at this time.     Will follow up in 2 weeks.         Education Documentation  No documentation found.        Eleonora BEASLEY  Ambulatory Case Management    4/15/2025, 10:36 EDT

## 2025-04-21 ENCOUNTER — TELEPHONE (OUTPATIENT)
Dept: NEUROLOGY | Facility: CLINIC | Age: 85
End: 2025-04-21

## 2025-04-21 NOTE — TELEPHONE ENCOUNTER
Caller: DAVIDE    Relationship:  GRANDDAUGHTER    Best call back number: [phone] (home)       PATIENT CALLED REQUESTING TO CANCEL SAME DAY APPT.    Did the patient call AFTER the start time of their scheduled appointment?  []YES  [x]NO    Was the patient's appointment rescheduled? [x]YES  []NO    Any additional information: R.S FOR JULY 8TH ( NEXT AVAILABLE APPT)

## 2025-04-30 ENCOUNTER — PATIENT OUTREACH (OUTPATIENT)
Dept: CASE MANAGEMENT | Facility: OTHER | Age: 85
End: 2025-04-30
Payer: COMMERCIAL

## 2025-04-30 DIAGNOSIS — E78.00 HIGH CHOLESTEROL: ICD-10-CM

## 2025-04-30 DIAGNOSIS — I51.9 LV DYSFUNCTION: Primary | ICD-10-CM

## 2025-04-30 DIAGNOSIS — I34.0 NONRHEUMATIC MITRAL VALVE REGURGITATION: ICD-10-CM

## 2025-04-30 NOTE — OUTREACH NOTE
[facility] End of Month Documentation    This Chronic Medical Management Care Plan for Arnaldo Benoit, 84 y.o. male, has been monitored and managed; reviewed and a new plan of care implemented for the month of April.  A cumulative time of 32  minutes was spent on this patient record this month, including chart review; phone call with relative.    Regarding the patient's problems: has Skin cancer of scalp; Rib pain on left side; LV dysfunction; and Nonrheumatic mitral valve regurgitation on their problem list., the following items were addressed: medications; medical records and any changes can be found within the plan section of the note.  A detailed listing of time spent for chronic care management is tracked within each outreach encounter.  Current medications include:  has a current medication list which includes the following prescription(s): atorvastatin, carvedilol, donepezil, and lisinopril. and the patient is reported to be patient is compliant with medication protocol,  Medications are reported to be effective.  Regarding these diagnoses, referrals were made to the following provider(s):  NA.  All notes on chart for PCP to review.    The patient was monitored remotely for blood pressure; activity level; mood & behavior; medications.    The patient's physical needs include:  needs assistance with ADLs; medication education; help taking medications as prescribed.     The patient's mental support needs include:  continued support    The patient's cognitive support needs include:  needs assistance with ADLs; requires supervision    The patient's psychosocial support needs include:  medication management or adherence    The patient's functional needs include: medication education    The patient's environmental needs include:  not applicable    Care Plan overall comments:  No data recorded    Refer to previous outreach notes for more information on the areas listed above.    Monthly Billing Diagnoses  (I51.9) LV  dysfunction    (E78.00) High cholesterol    (I34.0) Nonrheumatic mitral valve regurgitation    Medications   Medications have been reconciled    Care Plan progress this month:      Recently Modified Care Plans Updates made since 3/30/2025 12:00 AM      No recently modified care plans.             Dementia       Care for the Caregiver (Progressing)       Start:  01/30/25    Expected End:  07/30/25         My Caregiver Care To Do List       Start:  01/30/25         Why is this important?  People who care for a person with dementia often feel stress.  Too much stress can be harmful to you and your loved one.  You may feel depressed, exhausted, have trouble sleeping or have health problems.  You need to take care of yourself and reach out for help when you need it.  There are some things that can be done to keep the quality of your life satisfying.              Keeping Myself/My Loved One Safe (Progressing)       Start:  01/30/25    Expected End:  07/30/25         My Safety To Do List       Start:  01/30/25         Why is this important?  Safety is important when you or your loved one has dementia.  Eyesight, hearing and changes in feelings of hot and cold or depth-perception may occur.  Wandering or getting lost may happen.  You/your loved one may have to stop driving.  Taking steps to improve safety can prevent injuries.  It will also help you/your loved one feel more relaxed.  It will help you/your loved one be independent for a longer time.              Prevent Falls (Progressing)       Start:  01/30/25    Expected End:  07/30/25         My Falls Prevention To Do List       Start:  01/30/25         Why is this important?  There may be trouble with balance and getting around. Falls can happen.              Planning for Long-Term Care (Progressing)       Start:  01/30/25    Expected End:  07/30/25         My Long-Term Care To Do List       Start:  01/30/25         Why is this important?  Learning that you or your  loved one has dementia can be scary and stressful.  You can reduce stress by planning.  Preparing for the future is one of the most important things to do.  Thinking about how much care you/your loved one will need and how much it will cost is not easy.  Early on, you/your loved one can be part of making decisions for the future.  Making sure that your/your loved one's wishes for care are known is important.              Optimal Care Coordination of a Patient Experiencing Dementia (Progressing)       Start:  01/30/25    Expected End:  07/30/25         Develop Strategies to Manage Behavior       Start:  01/30/25            Recognize and Manage Caregiver Stress       Start:  01/30/25            Facilitate Optimal Mental Processes       Start:  01/30/25            Identify and Reduce Risks for Harm or Injury       Start:  01/30/25            Facilitate Activities of Long-Term Care Planning       Start:  01/30/25            Alleviate Barriers to Optimal Nutrition       Start:  01/30/25            Develop Strategies to Manage Mood and Emotion       Start:  01/30/25            Support Psychosocial Adjustment to Dementia Diagnosis       Start:  01/30/25            Facilitate Optimal Social and Functional Skills       Start:  01/30/25              Fall Risk       Prevent Falls and Injury (Progressing)       Start:  01/30/25    Expected End:  07/30/25         My Fall Prevention To Do List       Start:  01/30/25         Why is this important?  Most falls happen when it is hard for you to walk safely. Your balance may be off because of an illness. You may have pain in your knees, hip or other joints.  You may be overly tired or taking medicines that make you sleepy. You may not be able to see or hear clearly.  Falls can lead to broken bones, bruises or other injuries.  There are things you can do to help prevent falling.              Optimal Care Coordination of a Patient Experiencing Fall Risk (Progressing)       Start:   01/30/25    Expected End:  07/30/25         Identify and Manage Contributors to Fall Risk       Start:  01/30/25              Instructions   Patient was provided an electronic copy of care plan  CCM services were explained and offered and patient has accepted these services.  Patient has given their written consent to receive CCM services and understands that this includes the authorization of electronic communication of medical information with the other treating providers.  Patient understands that they may stop CCM services at any time and these changes will be effective at the end of the calendar month and will effectively revocate the agreement of CCM services.  Patient understands that only one practitioner can furnish and be paid for CCM services during one calendar month.  Patient also understands that there may be co-payment or deductible fees in association with CCM services.  Patient will continue with at least monthly follow-up calls with the Ambulatory .    Eleonora BEASLEY  Ambulatory Case Management    4/30/2025, 09:10 EDT

## 2025-05-14 ENCOUNTER — PATIENT OUTREACH (OUTPATIENT)
Dept: CASE MANAGEMENT | Facility: OTHER | Age: 85
End: 2025-05-14
Payer: COMMERCIAL

## 2025-05-14 DIAGNOSIS — E78.00 HIGH CHOLESTEROL: ICD-10-CM

## 2025-05-14 DIAGNOSIS — I51.9 LV DYSFUNCTION: Primary | ICD-10-CM

## 2025-05-14 NOTE — OUTREACH NOTE
AMBULATORY CASE MANAGEMENT NOTE    Names and Relationships of Patient/Support Persons: Contact: CESAR BURTON; Relationship: Emergency Contact -     Care Coordination  Patient remains at [facility], doing well on meds. Due to Cesar current medical conditions, patient's granddaughter is working to get patient transferred to [place].   Patient BP maintaining, continues with poor memory. No overt signs of distress, family trying to protect him from Cesar's medical issues.    RN will continue to follow up with patient and family for transition assistance as needed      Education Documentation  No documentation found.        Eleonora BEASLEY  Ambulatory Case Management    5/14/2025, 11:08 EDT

## 2025-05-30 ENCOUNTER — PATIENT OUTREACH (OUTPATIENT)
Dept: CASE MANAGEMENT | Facility: OTHER | Age: 85
End: 2025-05-30
Payer: COMMERCIAL

## 2025-05-30 DIAGNOSIS — E78.00 HIGH CHOLESTEROL: ICD-10-CM

## 2025-05-30 DIAGNOSIS — I51.9 LV DYSFUNCTION: Primary | ICD-10-CM

## 2025-05-30 DIAGNOSIS — I34.0 NONRHEUMATIC MITRAL VALVE REGURGITATION: ICD-10-CM

## 2025-05-30 NOTE — OUTREACH NOTE
[facility] End of Month Documentation    This Chronic Medical Management Care Plan for Arnaldo Benoit, 85 y.o. male, has been monitored and managed; reviewed and a new plan of care implemented for the month of May.  A cumulative time of 21  minutes was spent on this patient record this month, including chart review; phone call with relative.    Regarding the patient's problems: has Skin cancer of scalp; Rib pain on left side; LV dysfunction; and Nonrheumatic mitral valve regurgitation on their problem list., the following items were addressed: medications; medical records and any changes can be found within the plan section of the note.  A detailed listing of time spent for chronic care management is tracked within each outreach encounter.  Current medications include:  has a current medication list which includes the following prescription(s): atorvastatin, carvedilol, donepezil, and lisinopril. and the patient is reported to be patient is compliant with medication protocol,  Medications are reported to be effective.  Regarding these diagnoses, referrals were made to the following provider(s):  NA.  All notes on chart for PCP to review.    The patient was monitored remotely for blood pressure; activity level; mood & behavior; medications.    The patient's physical needs include:  needs assistance with ADLs; medication education; help taking medications as prescribed.     The patient's mental support needs include:  continued support    The patient's cognitive support needs include:  needs assistance with ADLs; requires supervision    The patient's psychosocial support needs include:  medication management or adherence    The patient's functional needs include: medication education    The patient's environmental needs include:  not applicable    Care Plan overall comments:  No data recorded    Refer to previous outreach notes for more information on the areas listed above.    Monthly Billing Diagnoses  (I51.9) LV  dysfunction    (E78.00) High cholesterol    (I34.0) Nonrheumatic mitral valve regurgitation    Medications   Medications have been reconciled    Care Plan progress this month:      Recently Modified Care Plans Updates made since 4/29/2025 12:00 AM      No recently modified care plans.             Dementia       Care for the Caregiver (Progressing)       Start:  01/30/25    Expected End:  07/30/25         My Caregiver Care To Do List       Start:  01/30/25         Why is this important?  People who care for a person with dementia often feel stress.  Too much stress can be harmful to you and your loved one.  You may feel depressed, exhausted, have trouble sleeping or have health problems.  You need to take care of yourself and reach out for help when you need it.  There are some things that can be done to keep the quality of your life satisfying.              Keeping Myself/My Loved One Safe (Progressing)       Start:  01/30/25    Expected End:  07/30/25         My Safety To Do List       Start:  01/30/25         Why is this important?  Safety is important when you or your loved one has dementia.  Eyesight, hearing and changes in feelings of hot and cold or depth-perception may occur.  Wandering or getting lost may happen.  You/your loved one may have to stop driving.  Taking steps to improve safety can prevent injuries.  It will also help you/your loved one feel more relaxed.  It will help you/your loved one be independent for a longer time.              Prevent Falls (Progressing)       Start:  01/30/25    Expected End:  07/30/25         My Falls Prevention To Do List       Start:  01/30/25         Why is this important?  There may be trouble with balance and getting around. Falls can happen.              Planning for Long-Term Care (Progressing)       Start:  01/30/25    Expected End:  07/30/25         My Long-Term Care To Do List       Start:  01/30/25         Why is this important?  Learning that you or your  loved one has dementia can be scary and stressful.  You can reduce stress by planning.  Preparing for the future is one of the most important things to do.  Thinking about how much care you/your loved one will need and how much it will cost is not easy.  Early on, you/your loved one can be part of making decisions for the future.  Making sure that your/your loved one's wishes for care are known is important.              Optimal Care Coordination of a Patient Experiencing Dementia (Progressing)       Start:  01/30/25    Expected End:  07/30/25         Develop Strategies to Manage Behavior       Start:  01/30/25            Recognize and Manage Caregiver Stress       Start:  01/30/25            Facilitate Optimal Mental Processes       Start:  01/30/25            Identify and Reduce Risks for Harm or Injury       Start:  01/30/25            Facilitate Activities of Long-Term Care Planning       Start:  01/30/25            Alleviate Barriers to Optimal Nutrition       Start:  01/30/25            Develop Strategies to Manage Mood and Emotion       Start:  01/30/25            Support Psychosocial Adjustment to Dementia Diagnosis       Start:  01/30/25            Facilitate Optimal Social and Functional Skills       Start:  01/30/25              Fall Risk       Prevent Falls and Injury (Progressing)       Start:  01/30/25    Expected End:  07/30/25         My Fall Prevention To Do List       Start:  01/30/25         Why is this important?  Most falls happen when it is hard for you to walk safely. Your balance may be off because of an illness. You may have pain in your knees, hip or other joints.  You may be overly tired or taking medicines that make you sleepy. You may not be able to see or hear clearly.  Falls can lead to broken bones, bruises or other injuries.  There are things you can do to help prevent falling.              Optimal Care Coordination of a Patient Experiencing Fall Risk (Progressing)       Start:   01/30/25    Expected End:  07/30/25         Identify and Manage Contributors to Fall Risk       Start:  01/30/25                Instructions   Patient was provided an electronic copy of care plan  CCM services were explained and offered and patient has accepted these services.  Patient has given their written consent to receive CCM services and understands that this includes the authorization of electronic communication of medical information with the other treating providers.  Patient understands that they may stop CCM services at any time and these changes will be effective at the end of the calendar month and will effectively revocate the agreement of CCM services.  Patient understands that only one practitioner can furnish and be paid for CCM services during one calendar month.  Patient also understands that there may be co-payment or deductible fees in association with CCM services.  Patient will continue with at least monthly follow-up calls with the Ambulatory .    Eleonora BEASLEY  Ambulatory Case Management    5/30/2025, 11:20 EDT

## 2025-06-04 ENCOUNTER — PATIENT OUTREACH (OUTPATIENT)
Dept: CASE MANAGEMENT | Facility: OTHER | Age: 85
End: 2025-06-04
Payer: COMMERCIAL

## 2025-06-04 DIAGNOSIS — I51.9 LV DYSFUNCTION: ICD-10-CM

## 2025-06-04 DIAGNOSIS — E78.00 HIGH CHOLESTEROL: Primary | ICD-10-CM

## 2025-06-04 NOTE — OUTREACH NOTE
[facility] End of Month Documentation    This Chronic Medical Management Care Plan for Arnaldo Benoit, 85 y.o. male, has been complete; reviewed and a new plan of care implemented for the month of June.  A cumulative time of 20  minutes was spent on this patient record this month, including chart review; phone call with relative.    Regarding the patient's problems: has Skin cancer of scalp; Rib pain on left side; LV dysfunction; and Nonrheumatic mitral valve regurgitation on their problem list., the following items were addressed: medications; medical records and any changes can be found within the plan section of the note.  A detailed listing of time spent for chronic care management is tracked within each outreach encounter.  Current medications include:  has a current medication list which includes the following prescription(s): atorvastatin, carvedilol, donepezil, and lisinopril. and the patient is reported to be patient is compliant with medication protocol,  Medications are reported to be effective.  Regarding these diagnoses, referrals were made to the following provider(s):  NA.  All notes on chart for PCP to review.    The patient was monitored remotely for medications; activity level.    The patient's physical needs include:  needs met.     The patient's mental support needs include:  needs met    The patient's cognitive support needs include:  needs met    The patient's psychosocial support needs include:  needs met    The patient's functional needs include: needs met    The patient's environmental needs include:  not applicable    Care Plan overall comments:  patient moving to [place]    Refer to previous outreach notes for more information on the areas listed above.    Monthly Billing Diagnoses  (E78.00) High cholesterol    (I51.9) LV dysfunction    Medications   Medications have been reconciled    Care Plan progress this month:      Recently Modified Care Plans Updates made since 5/4/2025 12:00 AM      No  recently modified care plans.          Instructions   Patient was provided an electronic copy of care plan  CCM services were explained and offered and patient has accepted these services.  Patient has given their written consent to receive CCM services and understands that this includes the authorization of electronic communication of medical information with the other treating providers.  Patient understands that they may stop CCM services at any time and these changes will be effective at the end of the calendar month and will effectively revocate the agreement of CCM services.  Patient understands that only one practitioner can furnish and be paid for CCM services during one calendar month.  Patient also understands that there may be co-payment or deductible fees in association with CCM services.  Patient will continue with at least monthly follow-up calls with the Ambulatory .    Eleonora BEASLEY  Ambulatory Case Management    6/4/2025, 10:17 EDT

## 2025-06-04 NOTE — OUTREACH NOTE
AMBULATORY CASE MANAGEMENT NOTE    Names and Relationships of Patient/Support Persons: Contact: CESAR BURTON; Relationship: Emergency Contact -     Patient remains stable and at baseline at this time. Will be moving to [place]. Case closed at this time, goals met.     Education Documentation  No documentation found.        Eleonora BEASLEY  Ambulatory Case Management    6/4/2025, 10:12 EDT

## 2025-06-09 ENCOUNTER — APPOINTMENT (OUTPATIENT)
Dept: GENERAL RADIOLOGY | Facility: HOSPITAL | Age: 85
End: 2025-06-09
Payer: MEDICARE

## 2025-06-09 ENCOUNTER — HOSPITAL ENCOUNTER (EMERGENCY)
Facility: HOSPITAL | Age: 85
Discharge: NURSING, REHAB OR LONG-TERM CARE | End: 2025-06-09
Attending: EMERGENCY MEDICINE | Admitting: EMERGENCY MEDICINE
Payer: MEDICARE

## 2025-06-09 ENCOUNTER — APPOINTMENT (OUTPATIENT)
Dept: CT IMAGING | Facility: HOSPITAL | Age: 85
End: 2025-06-09
Payer: MEDICARE

## 2025-06-09 ENCOUNTER — HOSPITAL ENCOUNTER (EMERGENCY)
Facility: HOSPITAL | Age: 85
Discharge: NURSING, REHAB OR LONG-TERM CARE | End: 2025-06-10
Attending: EMERGENCY MEDICINE | Admitting: EMERGENCY MEDICINE
Payer: MEDICARE

## 2025-06-09 VITALS
RESPIRATION RATE: 17 BRPM | SYSTOLIC BLOOD PRESSURE: 166 MMHG | OXYGEN SATURATION: 98 % | WEIGHT: 191.36 LBS | HEART RATE: 70 BPM | DIASTOLIC BLOOD PRESSURE: 79 MMHG | BODY MASS INDEX: 26.79 KG/M2 | HEIGHT: 71 IN | TEMPERATURE: 98.6 F

## 2025-06-09 DIAGNOSIS — S01.80XA OPEN WOUND OF FACE, INITIAL ENCOUNTER: ICD-10-CM

## 2025-06-09 DIAGNOSIS — S00.81XA ABRASION OF FACE, INITIAL ENCOUNTER: ICD-10-CM

## 2025-06-09 DIAGNOSIS — S51.002A OPEN WOUND OF LEFT ELBOW, INITIAL ENCOUNTER: ICD-10-CM

## 2025-06-09 DIAGNOSIS — R53.1 WEAKNESS: ICD-10-CM
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
## 2025-06-09 DIAGNOSIS — J90 PLEURAL EFFUSION: Primary | ICD-10-CM

## 2025-06-09 DIAGNOSIS — R51.9 NONINTRACTABLE HEADACHE, UNSPECIFIED CHRONICITY PATTERN, UNSPECIFIED HEADACHE TYPE: ICD-10-CM

## 2025-06-09 DIAGNOSIS — S62.604A CLOSED NONDISPLACED FRACTURE OF PHALANX OF RIGHT RING FINGER, UNSPECIFIED PHALANX, INITIAL ENCOUNTER: ICD-10-CM

## 2025-06-09 LAB
ALBUMIN SERPL-MCNC: 4.3 G/DL (ref 3.5–5.2)
ALBUMIN/GLOB SERPL: 1.4 G/DL
ALP SERPL-CCNC: 84 U/L (ref 39–117)
ALT SERPL W P-5'-P-CCNC: 9 U/L (ref 1–41)
ANION GAP SERPL CALCULATED.3IONS-SCNC: 13.5 MMOL/L (ref 5–15)
AST SERPL-CCNC: 18 U/L (ref 1–40)
BASOPHILS # BLD AUTO: 0.03 10*3/MM3 (ref 0–0.2)
BASOPHILS NFR BLD AUTO: 0.3 % (ref 0–1.5)
BILIRUB SERPL-MCNC: 0.5 MG/DL (ref 0–1.2)
BILIRUB UR QL STRIP: NEGATIVE
BUN SERPL-MCNC: 10.9 MG/DL (ref 8–23)
BUN/CREAT SERPL: 11.8 (ref 7–25)
CALCIUM SPEC-SCNC: 9.3 MG/DL (ref 8.6–10.5)
CHLORIDE SERPL-SCNC: 99 MMOL/L (ref 98–107)
CLARITY UR: CLEAR
CO2 SERPL-SCNC: 21.5 MMOL/L (ref 22–29)
COLOR UR: YELLOW
CREAT SERPL-MCNC: 0.92 MG/DL (ref 0.76–1.27)
DEPRECATED RDW RBC AUTO: 54.2 FL (ref 37–54)
EGFRCR SERPLBLD CKD-EPI 2021: 81 ML/MIN/1.73
EOSINOPHIL # BLD AUTO: 0.1 10*3/MM3 (ref 0–0.4)
EOSINOPHIL NFR BLD AUTO: 1 % (ref 0.3–6.2)
ERYTHROCYTE [DISTWIDTH] IN BLOOD BY AUTOMATED COUNT: 16.9 % (ref 12.3–15.4)
GLOBULIN UR ELPH-MCNC: 3 GM/DL
GLUCOSE SERPL-MCNC: 93 MG/DL (ref 65–99)
GLUCOSE UR STRIP-MCNC: NEGATIVE MG/DL
HCT VFR BLD AUTO: 39.6 % (ref 37.5–51)
HGB BLD-MCNC: 12.8 G/DL (ref 13–17.7)
HGB UR QL STRIP.AUTO: NEGATIVE
HOLD SPECIMEN: NORMAL
HOLD SPECIMEN: NORMAL
IMM GRANULOCYTES # BLD AUTO: 0.04 10*3/MM3 (ref 0–0.05)
IMM GRANULOCYTES NFR BLD AUTO: 0.4 % (ref 0–0.5)
KETONES UR QL STRIP: NEGATIVE
LEUKOCYTE ESTERASE UR QL STRIP.AUTO: NEGATIVE
LIPASE SERPL-CCNC: 37 U/L (ref 13–60)
LYMPHOCYTES # BLD AUTO: 2.18 10*3/MM3 (ref 0.7–3.1)
LYMPHOCYTES NFR BLD AUTO: 20.8 % (ref 19.6–45.3)
MAGNESIUM SERPL-MCNC: 2.2 MG/DL (ref 1.6–2.4)
MCH RBC QN AUTO: 28.5 PG (ref 26.6–33)
MCHC RBC AUTO-ENTMCNC: 32.3 G/DL (ref 31.5–35.7)
MCV RBC AUTO: 88.2 FL (ref 79–97)
MONOCYTES # BLD AUTO: 1.18 10*3/MM3 (ref 0.1–0.9)
MONOCYTES NFR BLD AUTO: 11.2 % (ref 5–12)
NEUTROPHILS NFR BLD AUTO: 6.96 10*3/MM3 (ref 1.7–7)
NEUTROPHILS NFR BLD AUTO: 66.3 % (ref 42.7–76)
NITRITE UR QL STRIP: NEGATIVE
NRBC BLD AUTO-RTO: 0 /100 WBC (ref 0–0.2)
NT-PROBNP SERPL-MCNC: 1216 PG/ML (ref 0–1800)
PH UR STRIP.AUTO: 8.5 [PH] (ref 5–8)
PLATELET # BLD AUTO: 302 10*3/MM3 (ref 140–450)
PMV BLD AUTO: 10.4 FL (ref 6–12)
POTASSIUM SERPL-SCNC: 4.4 MMOL/L (ref 3.5–5.2)
PROT SERPL-MCNC: 7.3 G/DL (ref 6–8.5)
PROT UR QL STRIP: NEGATIVE
QT INTERVAL: 445 MS
QTC INTERVAL: 479 MS
RBC # BLD AUTO: 4.49 10*6/MM3 (ref 4.14–5.8)
SODIUM SERPL-SCNC: 134 MMOL/L (ref 136–145)
SP GR UR STRIP: 1.01 (ref 1–1.03)
UROBILINOGEN UR QL STRIP: ABNORMAL
WBC NRBC COR # BLD AUTO: 10.49 10*3/MM3 (ref 3.4–10.8)
WHOLE BLOOD HOLD COAG: NORMAL
WHOLE BLOOD HOLD SPECIMEN: NORMAL

## 2025-06-09 PROCEDURE — 73130 X-RAY EXAM OF HAND: CPT

## 2025-06-09 PROCEDURE — 81003 URINALYSIS AUTO W/O SCOPE: CPT | Performed by: EMERGENCY MEDICINE

## 2025-06-09 PROCEDURE — 25010000002 LIDOCAINE 1% - EPINEPHRINE 1:100000 1 %-1:100000 SOLUTION: Performed by: EMERGENCY MEDICINE

## 2025-06-09 PROCEDURE — 84484 ASSAY OF TROPONIN QUANT: CPT | Performed by: EMERGENCY MEDICINE

## 2025-06-09 PROCEDURE — 83690 ASSAY OF LIPASE: CPT | Performed by: EMERGENCY MEDICINE

## 2025-06-09 PROCEDURE — 71045 X-RAY EXAM CHEST 1 VIEW: CPT

## 2025-06-09 PROCEDURE — 80053 COMPREHEN METABOLIC PANEL: CPT | Performed by: EMERGENCY MEDICINE

## 2025-06-09 PROCEDURE — 83735 ASSAY OF MAGNESIUM: CPT | Performed by: EMERGENCY MEDICINE

## 2025-06-09 PROCEDURE — 93005 ELECTROCARDIOGRAM TRACING: CPT | Performed by: EMERGENCY MEDICINE

## 2025-06-09 PROCEDURE — 85025 COMPLETE CBC W/AUTO DIFF WBC: CPT | Performed by: EMERGENCY MEDICINE

## 2025-06-09 PROCEDURE — 73080 X-RAY EXAM OF ELBOW: CPT

## 2025-06-09 PROCEDURE — 99284 EMERGENCY DEPT VISIT MOD MDM: CPT

## 2025-06-09 PROCEDURE — 83880 ASSAY OF NATRIURETIC PEPTIDE: CPT | Performed by: EMERGENCY MEDICINE

## 2025-06-09 PROCEDURE — 99285 EMERGENCY DEPT VISIT HI MDM: CPT

## 2025-06-09 PROCEDURE — 70450 CT HEAD/BRAIN W/O DYE: CPT

## 2025-06-09 RX ORDER — SODIUM CHLORIDE 0.9 % (FLUSH) 0.9 %
10 SYRINGE (ML) INJECTION AS NEEDED
Status: DISCONTINUED | OUTPATIENT
Start: 2025-06-09 | End: 2025-06-10 | Stop reason: HOSPADM

## 2025-06-09 RX ORDER — LIDOCAINE HYDROCHLORIDE AND EPINEPHRINE 10; 10 MG/ML; UG/ML
10 INJECTION, SOLUTION INFILTRATION; PERINEURAL ONCE
Status: COMPLETED | OUTPATIENT
Start: 2025-06-09 | End: 2025-06-09

## 2025-06-09 RX ORDER — ASPIRIN 81 MG/1
324 TABLET, CHEWABLE ORAL ONCE
Status: DISCONTINUED | OUTPATIENT
Start: 2025-06-09 | End: 2025-06-10 | Stop reason: HOSPADM

## 2025-06-09 RX ADMIN — LIDOCAINE HYDROCHLORIDE,EPINEPHRINE BITARTRATE 10 ML: 10; .01 INJECTION, SOLUTION INFILTRATION; PERINEURAL at 15:28

## 2025-06-09 NOTE — ED PROVIDER NOTES
Time: 2:23 PM EDT  Date of encounter:  2025  Independent Historian/Clinical History and Information was obtained by:   Patient and EMS    History is limited by: N/A    Chief Complaint: Fall      History of Present Illness:  Patient is a 85 y.o. year old male who presents to the emergency department for evaluation of injuries status post fall.  Patient reports lost control of his walker and fell onto the concrete.  Denies loss of consciousness.      Patient Care Team  Primary Care Provider: Celina Salgado APRN    Past Medical History:     Allergies   Allergen Reactions    Beef-Derived Drug Products Unknown - Low Severity     Past Medical History:   Diagnosis Date    Dementia     early    Hyperlipidemia      No past surgical history on file.  No family history on file.    Home Medications:  Prior to Admission medications    Medication Sig Start Date End Date Taking? Authorizing Provider   atorvastatin (LIPITOR) 20 MG tablet Take 1 tablet by mouth Daily. 25   Celina Salgado APRN   carvedilol (COREG) 3.125 MG tablet Take 1 tablet by mouth 2 (Two) Times a Day With Meals. 25   PARVIZ Larios MD   donepezil (Aricept) 10 MG tablet Take 1 tablet by mouth Every Night. 25   Celina Salgado APRN   lisinopril (PRINIVIL,ZESTRIL) 5 MG tablet Take 1 tablet by mouth Daily. 25   Mayda Lundberg APRN        Social History:   Social History     Tobacco Use    Smoking status: Former     Current packs/day: 0.00     Average packs/day: 0.5 packs/day for 3.0 years (1.5 ttl pk-yrs)     Types: Cigarettes     Start date:      Quit date:      Years since quittin.4     Passive exposure: Past    Smokeless tobacco: Never   Vaping Use    Vaping status: Never Used   Substance Use Topics    Alcohol use: Yes     Comment: patient and family states occasional rum drinker. place of living and EMS states patient drinks daily.    Drug use: Never         Review of Systems:  Review of Systems  "  Constitutional:  Negative for chills and fever.   HENT:  Negative for congestion, rhinorrhea and sore throat.    Eyes:  Negative for pain and visual disturbance.   Respiratory:  Negative for apnea, cough, chest tightness and shortness of breath.    Cardiovascular:  Negative for chest pain and palpitations.   Gastrointestinal:  Negative for abdominal pain, diarrhea, nausea and vomiting.   Genitourinary:  Negative for difficulty urinating and dysuria.   Musculoskeletal:  Negative for joint swelling and myalgias.   Skin:  Negative for color change.   Neurological:  Negative for seizures and headaches.   Psychiatric/Behavioral: Negative.     All other systems reviewed and are negative.       Physical Exam:  /95 (BP Location: Right arm, Patient Position: Lying)   Pulse 76   Temp 97.9 °F (36.6 °C) (Oral)   Resp 18   Ht 180.3 cm (71\")   Wt 86.8 kg (191 lb 5.8 oz)   SpO2 98%   BMI 26.69 kg/m²     Physical Exam  Vitals and nursing note reviewed.   Constitutional:       General: He is not in acute distress.     Appearance: Normal appearance. He is not toxic-appearing.   HENT:      Head: Normocephalic and atraumatic.      Jaw: There is normal jaw occlusion.   Eyes:      General: Lids are normal.      Extraocular Movements: Extraocular movements intact.      Conjunctiva/sclera: Conjunctivae normal.      Pupils: Pupils are equal, round, and reactive to light.   Cardiovascular:      Rate and Rhythm: Normal rate and regular rhythm.      Pulses: Normal pulses.      Heart sounds: Normal heart sounds.   Pulmonary:      Effort: Pulmonary effort is normal. No respiratory distress.      Breath sounds: Normal breath sounds. No wheezing or rhonchi.   Abdominal:      General: Abdomen is flat.      Palpations: Abdomen is soft.      Tenderness: There is no abdominal tenderness. There is no guarding or rebound.   Musculoskeletal:         General: Normal range of motion.      Cervical back: Normal range of motion and neck " supple.      Right lower leg: No edema.      Left lower leg: No edema.   Skin:     General: Skin is warm.      Comments: Abrasion/skin tear left forehead and left elbow   Neurological:      Mental Status: He is alert and oriented to person, place, and time. Mental status is at baseline.   Psychiatric:         Mood and Affect: Mood normal.                    Medical Decision Making:      Comorbidities that affect care:    Dementia, hypertension    External Notes reviewed:    None      The following orders were placed and all results were independently analyzed by me:  Orders Placed This Encounter   Procedures    Splint Application    Laceration Repair    XR Hand 3+ View Right    XR Elbow 3+ View Left    CT Head Without Contrast    Please clean wounds  Nursing Communication       Medications Given in the Emergency Department:  Medications   lidocaine 1% - EPINEPHrine [id number] (XYLOCAINE W/EPI) 1 %-1:[id number] injection 10 mL (10 mL Injection Given 6/9/25 1528)        ED Course:         Labs:    Lab Results (last 24 hours)       ** No results found for the last 24 hours. **             Imaging:    CT Head Without Contrast  Result Date: 6/9/2025  CT HEAD WO CONTRAST Date of Exam: 6/9/2025 1:44 PM EDT Indication: fall. Trauma. Comparison: 11/24/2024 Technique: Axial CT images were obtained of the head without contrast administration.  Reconstructed coronal and sagittal images were also obtained. Automated exposure control and iterative construction methods were used. Findings: There is a background of diffuse parenchymal volume loss. There is moderate to severe periventricular white matter hypodensity most commonly secondary to chronic small vessel ischemic change. There is no evidence of mass effect, midline shift, acute hemorrhage or edema. No abnormal fluid collections are identified. Ventricles are appropriate in size and configuration for the patient's age and degree of volume loss. Normal gray-white differentiation.  Midline structures are grossly unremarkable. Ossified mass in the left frontal sinus appears unchanged, again may be due to an osteoma. Probable mucous retention cyst or polyp in the left maxillary sinus. Paranasal sinuses and mastoid air cells are otherwise clear. No acute calvarial defects are seen. Orbits are unremarkable for age.     Impression: 1.No acute intracranial abnormality identified by noncontrast CT. 2.Diffuse parenchymal volume loss and probable chronic small vessel ischemic change. Electronically Signed: Bari Steward MD  6/9/2025 2:07 PM EDT  Workstation ID: [id number]    XR Hand 3+ View Right  Result Date: 6/9/2025  XR HAND 3+ VW RIGHT, XR ELBOW 3+ VW LEFT Date of Exam: 6/9/2025 1:37 PM EDT Indication: fall Comparison: None available. Findings: Evaluation of the right hand demonstrates some questionable osseous irregularity along the head of the fourth middle phalanx. Minimally displaced fracture cannot be excluded. Some joint space narrowing is noted involving the fourth and fifth DIP joints with mild flexion deformity. No definite soft tissue abnormality. Evaluation of the left elbow shows no evidence for acute fracture or acute alignment abnormality. No underlying joint effusion or soft tissue abnormality.     Impression: Questionable fracture at the head of the fourth middle phalanx. No evidence for acute osseous injury to the left elbow. Electronically Signed: Keshia Awan MD  6/9/2025 1:42 PM EDT  Workstation ID: [id number]    XR Elbow 3+ View Left  Result Date: 6/9/2025  XR HAND 3+ VW RIGHT, XR ELBOW 3+ VW LEFT Date of Exam: 6/9/2025 1:37 PM EDT Indication: fall Comparison: None available. Findings: Evaluation of the right hand demonstrates some questionable osseous irregularity along the head of the fourth middle phalanx. Minimally displaced fracture cannot be excluded. Some joint space narrowing is noted involving the fourth and fifth DIP joints with mild flexion deformity. No definite soft  tissue abnormality. Evaluation of the left elbow shows no evidence for acute fracture or acute alignment abnormality. No underlying joint effusion or soft tissue abnormality.     Impression: Questionable fracture at the head of the fourth middle phalanx. No evidence for acute osseous injury to the left elbow. Electronically Signed: Keshia Awan MD  6/9/2025 1:42 PM EDT  Workstation ID: [id number]        Differential Diagnosis and Discussion:    Trauma:  Differential diagnosis considered but not limited to were subarachnoid hemorrhage, intracranial bleeding, pneumothorax, cardiac contusion, lung contusion, intra-abdominal bleeding, and compartment syndrome of any extremity or other significant traumatic pathology    PROCEDURES:    X-ray were performed in the emergency department and all X-ray impressions were independently interpreted by me.  CT scan was performed in the emergency department and the CT scan radiology impression was interpreted by me.    No orders to display       Laceration Repair    Date/Time: 6/9/2025 3:45 PM    Performed by: Tc Chavis PA-C  Authorized by: Alexander Westbrook MD    Consent:     Consent obtained:  Verbal    Consent given by:  Patient    Risks, benefits, and alternatives were discussed: yes      Risks discussed:  Infection, nerve damage, need for additional repair, poor wound healing, poor cosmetic result, pain, tendon damage, vascular damage and retained foreign body    Alternatives discussed:  No treatment and delayed treatment  Universal protocol:     Procedure explained and questions answered to patient or proxy's satisfaction: yes      Relevant documents present and verified: yes      Site/side marked: yes      Immediately prior to procedure, a time out was called: yes      Patient identity confirmed:  Arm band and verbally with patient  Anesthesia:     Anesthesia method:  Local infiltration    Local anesthetic:  Lidocaine 1% WITH epi  Laceration details:     Location:   Shoulder/arm    Shoulder/arm location:  L elbow    Length (cm):  10    Depth (mm):  2  Pre-procedure details:     Preparation:  Patient was prepped and draped in usual sterile fashion  Exploration:     Limited defect created (wound extended): no      Hemostasis achieved with:  Direct pressure    Contaminated: yes    Treatment:     Area cleansed with:  Chlorhexidine and saline    Amount of cleaning:  Extensive    Irrigation solution:  Sterile saline    Irrigation volume:  20cc    Irrigation method:  Syringe    Visualized foreign bodies/material removed: no      Debridement:  None  Skin repair:     Repair method:  Sutures    Suture size:  4-0    Suture material:  Nylon    Suture technique:  Simple interrupted    Number of sutures:  5  Approximation:     Approximation:  Loose  Repair type:     Repair type:  Intermediate  Post-procedure details:     Dressing:  Non-adherent dressing and antibiotic ointment    Procedure completion:  Tolerated well, no immediate complications      MDM                     Patient Care Considerations:    LABS: I considered ordering labs, however no signs of metabolic derangement      Consultants/Shared Management Plan:    None    Social Determinants of Health:    Patient is independent, reliable, and has access to care.       Disposition and Care Coordination:    Discharged: The patient is suitable and stable for discharge with no need for consideration of admission.    I have explained the patient´s condition, diagnoses and treatment plan based on the information available to me at this time. I have answered questions and addressed any concerns. The patient has a good  understanding of the patient´s diagnosis, condition, and treatment plan as can be expected at this point. The vital signs have been stable. The patient´s condition is stable and appropriate for discharge from the emergency department.      The patient will pursue further outpatient evaluation with the primary care physician  or other designated or consulting physician as outlined in the discharge instructions. They are agreeable to this plan of care and follow-up instructions have been explained in detail. The patient has received these instructions in written format and has expressed an understanding of the discharge instructions. The patient is aware that any significant change in condition or worsening of symptoms should prompt an immediate return to this or the closest emergency department or call to 911.  I have explained discharge medications and the need for follow up with the patient/caretakers. This was also printed in the discharge instructions. Patient was discharged with the following medications and follow up:      Medication List      No changes were made to your prescriptions during this visit.      Celina Salgado, APRN  [address] [address]  [phone]    In 1 week         Final diagnoses:   Fall, initial encounter   Abrasion of face, initial encounter   Closed nondisplaced fracture of phalanx of right ring finger, unspecified phalanx, initial encounter        ED Disposition       ED Disposition   Discharge    Condition   Stable    Comment   --               This medical record created using voice recognition software.             Alexander Westbrook MD  06/09/25 [id number]

## 2025-06-09 NOTE — ED NOTES
RN spoke to DARRYL Shen from [facility]. Stated that all RN's were gone for the day but report given to care team (Hermelinda).

## 2025-06-10 ENCOUNTER — APPOINTMENT (OUTPATIENT)
Dept: CT IMAGING | Facility: HOSPITAL | Age: 85
End: 2025-06-10
Payer: MEDICARE

## 2025-06-10 VITALS
DIASTOLIC BLOOD PRESSURE: 85 MMHG | WEIGHT: 191.36 LBS | BODY MASS INDEX: 26.79 KG/M2 | HEIGHT: 71 IN | RESPIRATION RATE: 18 BRPM | TEMPERATURE: 97.9 F | OXYGEN SATURATION: 91 % | SYSTOLIC BLOOD PRESSURE: 165 MMHG | HEART RATE: 70 BPM

## 2025-06-10 LAB — TROPONIN T SERPL HS-MCNC: 26 NG/L

## 2025-06-10 PROCEDURE — 25510000001 IOPAMIDOL PER 1 ML: Performed by: EMERGENCY MEDICINE

## 2025-06-10 PROCEDURE — 71275 CT ANGIOGRAPHY CHEST: CPT

## 2025-06-10 RX ORDER — IOPAMIDOL 755 MG/ML
100 INJECTION, SOLUTION INTRAVASCULAR
Status: COMPLETED | OUTPATIENT
Start: 2025-06-10 | End: 2025-06-10

## 2025-06-10 RX ADMIN — IOPAMIDOL 100 ML: 755 INJECTION, SOLUTION INTRAVENOUS at 00:48

## 2025-06-10 NOTE — ED PROVIDER NOTES
Time: 10:38 PM EDT  Date of encounter:  2025  Independent Historian/Clinical History and Information was obtained by:   Patient and Nursing Staff    History is limited by: Dementia    Chief Complaint: fall, headache      History of Present Illness:  Patient is a 85 y.o. year old male who presents to the emergency department for evaluation of Headache.  Patient was seen here earlier after a ground-level fall.  Patient had x-rays and CT of his head.  He also had a laceration that was repaired.  Patient returned because he states he is in pain.  History somewhat limited.  Patient reports pain from wound from fall from earlier.  No known fever.  He does report some shortness of breath.      Patient Care Team  Primary Care Provider: Celina Salgado APRN    Past Medical History:     Allergies   Allergen Reactions    Beef-Derived Drug Products Unknown - Low Severity     Past Medical History:   Diagnosis Date    Dementia     early    Hyperlipidemia      History reviewed. No pertinent surgical history.  History reviewed. No pertinent family history.    Home Medications:  Prior to Admission medications    Medication Sig Start Date End Date Taking? Authorizing Provider   atorvastatin (LIPITOR) 20 MG tablet Take 1 tablet by mouth Daily. 25   Celina Salgado APRN   carvedilol (COREG) 3.125 MG tablet Take 1 tablet by mouth 2 (Two) Times a Day With Meals. 25   PARVIZ Larios MD   donepezil (Aricept) 10 MG tablet Take 1 tablet by mouth Every Night. 25   Celina Salgado APRN   lisinopril (PRINIVIL,ZESTRIL) 5 MG tablet Take 1 tablet by mouth Daily. 25   Mayda Lundberg APRN        Social History:   Social History     Tobacco Use    Smoking status: Former     Current packs/day: 0.00     Average packs/day: 0.5 packs/day for 3.0 years (1.5 ttl pk-yrs)     Types: Cigarettes     Start date:      Quit date:      Years since quittin.4     Passive exposure: Past    Smokeless tobacco: Never  "  Vaping Use    Vaping status: Never Used   Substance Use Topics    Alcohol use: Yes     Comment: patient and family states occasional rum drinker. place of living and EMS states patient drinks daily.    Drug use: Never         Review of Systems:  Review of Systems   Unable to perform ROS: Dementia        Physical Exam:  BP (!) 181/95 (BP Location: Left arm, Patient Position: Sitting)   Pulse 82   Temp 97.9 °F (36.6 °C)   Resp 18   Ht 180.3 cm (71\")   Wt 86.8 kg (191 lb 5.8 oz)   SpO2 98%   BMI 26.69 kg/m²     Physical Exam  HENT:      Head: Normocephalic.   Cardiovascular:      Rate and Rhythm: Normal rate.   Pulmonary:      Breath sounds: Normal breath sounds.   Abdominal:      Palpations: Abdomen is soft.      Tenderness: There is no abdominal tenderness.   Musculoskeletal:      Right lower leg: No edema.      Left lower leg: Edema present.   Skin:     Capillary Refill: Capillary refill takes less than 2 seconds.      Comments: Abrasion to left periorbital region approximately 4 x 4 cm.  Laceration with abrasion and skin tears to left elbow with sutures in place.   Neurological:      Mental Status: He is alert. Mental status is at baseline. He is disoriented.                    Medical Decision Making:      Comorbidities that affect care:    Dementia    External Notes reviewed:    Previous Clinic Note: Patient was seen by his PCP on 1/22/2025 for annual wellness exam, LV dysfunction, mitral valve insufficiency, ventricular bigeminy, dementia, hyperlipidemia, vitamin D deficiency, impaired fasting glucose, anemia.      The following orders were placed and all results were independently analyzed by me:  Orders Placed This Encounter   Procedures    XR Chest 1 View    CT Angiogram Chest Pulmonary Embolism    [place] Draw    High Sensitivity Troponin T    Comprehensive Metabolic Panel    Lipase    BNP    Magnesium    CBC Auto Differential    Urinalysis With Culture If Indicated - Urine, Clean Catch    " Ambulatory Referral to Wound Clinic    NPO Diet NPO Type: Strict NPO    Undress & Gown    Continuous Pulse Oximetry    Oxygen Therapy- Nasal Cannula; Titrate 1-6 LPM Per SpO2; 90 - 95%    ECG 12 Lead ED Triage Standing Order; Chest Pain    ECG 12 Lead ED Triage Standing Order; Chest Pain    Insert Peripheral IV    CBC & Differential    Green Top (Gel)    Lavender Top    Gold Top - SST    Light Blue Top       Medications Given in the Emergency Department:  Medications   sodium chloride 0.9 % flush 10 mL (has no administration in time range)   aspirin chewable tablet 324 mg (324 mg Oral Not Given 6/9/25 2208)   iopamidol (ISOVUE-370) 76 % injection 100 mL (100 mL Intravenous Given 6/10/25 0048)        ED Course:    ED Course as of 06/10/25 0342   Mon Jun 09, 2025   2222 ECG 12 Lead ED Triage Standing Order; Chest Pain  Sinus rhythm with a rate of 70.  Normal HI and QTc.  No acute ST elevation.  EKG interpreted by me [LD]      ED Course User Index  [LD] Jessica Reynolds MD       Labs:    Lab Results (last 24 hours)       Procedure Component Value Units Date/Time    High Sensitivity Troponin T [[id number]]  (Abnormal) Collected: 06/09/25 2149    Specimen: Blood Updated: 06/10/25 0002     HS Troponin T 26 ng/L     Narrative:      High Sensitive Troponin T Reference Range:  <14.0 ng/L- Negative Female for AMI  <22.0 ng/L- Negative Male for AMI  >=14 - Abnormal Female indicating possible myocardial injury.  >=22 - Abnormal Male indicating possible myocardial injury.   Clinicians would have to utilize clinical acumen, EKG, Troponin, and serial changes to determine if it is an Acute Myocardial Infarction or myocardial injury due to an underlying chronic condition.         CBC & Differential [[id number]]  (Abnormal) Collected: 06/09/25 2149    Specimen: Blood Updated: 06/09/25 2205    Narrative:      The following orders were created for panel order CBC & Differential.  Procedure                               Abnormality          Status                     ---------                               -----------         ------                     CBC Auto Differential[[id number]]        Abnormal            Final result                 Please view results for these tests on the individual orders.    Comprehensive Metabolic Panel [[id number]]  (Abnormal) Collected: 06/09/25 2149    Specimen: Blood Updated: 06/09/25 2305     Glucose 93 mg/dL      BUN 10.9 mg/dL      Creatinine 0.92 mg/dL      Sodium 134 mmol/L      Potassium 4.4 mmol/L      Chloride 99 mmol/L      CO2 21.5 mmol/L      Calcium 9.3 mg/dL      Total Protein 7.3 g/dL      Albumin 4.3 g/dL      ALT (SGPT) 9 U/L      AST (SGOT) 18 U/L      Alkaline Phosphatase 84 U/L      Total Bilirubin 0.5 mg/dL      Globulin 3.0 gm/dL      A/G Ratio 1.4 g/dL      BUN/Creatinine Ratio 11.8     Anion Gap 13.5 mmol/L      eGFR 81.0 mL/min/1.73     Narrative:      GFR Categories in Chronic Kidney Disease (CKD)              GFR Category          GFR (mL/min/1.73)    Interpretation  G1                    90 or greater        Normal or high (1)  G2                    60-89                Mild decrease (1)  G3a                   45-59                Mild to moderate decrease  G3b                   30-44                Moderate to severe decrease  G4                    15-29                Severe decrease  G5                    14 or less           Kidney failure    (1)In the absence of evidence of kidney disease, neither GFR category G1 or G2 fulfill the criteria for CKD.    eGFR calculation 2021 CKD-EPI creatinine equation, which does not include race as a factor    Lipase [[id number]]  (Normal) Collected: 06/09/25 2149    Specimen: Blood Updated: 06/09/25 2305     Lipase 37 U/L     BNP [[id number]]  (Normal) Collected: 06/09/25 2149    Specimen: Blood Updated: 06/09/25 2303     proBNP 1,216.0 pg/mL     Narrative:      This assay is used as an aid in the diagnosis of individuals suspected of having heart  failure. It can be used as an aid in the diagnosis of acute decompensated heart failure (ADHF) in patients presenting with signs and symptoms of ADHF to the emergency department (ED). In addition, NT-proBNP of <300 pg/mL indicates ADHF is not likely.    Age Range Result Interpretation  NT-proBNP Concentration (pg/mL:      <50             Positive            >450                   Gray                 300-450                    Negative             <300    50-75           Positive            >900                  Gray                300-900                  Negative            <300      >75             Positive            >1800                  Gray                300-1800                  Negative            <300    Magnesium [[id number]]  (Normal) Collected: 06/09/25 2149    Specimen: Blood Updated: 06/09/25 2305     Magnesium 2.2 mg/dL     CBC Auto Differential [[id number]]  (Abnormal) Collected: 06/09/25 2149    Specimen: Blood Updated: 06/09/25 2205     WBC 10.49 10*3/mm3      RBC 4.49 10*6/mm3      Hemoglobin 12.8 g/dL      Hematocrit 39.6 %      MCV 88.2 fL      MCH 28.5 pg      MCHC 32.3 g/dL      RDW 16.9 %      RDW-SD 54.2 fl      MPV 10.4 fL      Platelets 302 10*3/mm3      Neutrophil % 66.3 %      Lymphocyte % 20.8 %      Monocyte % 11.2 %      Eosinophil % 1.0 %      Basophil % 0.3 %      Immature Grans % 0.4 %      Neutrophils, Absolute 6.96 10*3/mm3      Lymphocytes, Absolute 2.18 10*3/mm3      Monocytes, Absolute 1.18 10*3/mm3      Eosinophils, Absolute 0.10 10*3/mm3      Basophils, Absolute 0.03 10*3/mm3      Immature Grans, Absolute 0.04 10*3/mm3      nRBC 0.0 /100 WBC     Urinalysis With Culture If Indicated - Urine, Clean Catch [[id number]]  (Abnormal) Collected: 06/09/25 2325    Specimen: Urine, Clean Catch Updated: 06/09/25 2332     Color, UA Yellow     Appearance, UA Clear     pH, UA 8.5     Specific Gravity, UA 1.008     Glucose, UA Negative     Ketones, UA Negative     Bilirubin, UA Negative      Blood, UA Negative     Protein, UA Negative     Leuk Esterase, UA Negative     Nitrite, UA Negative     Urobilinogen, UA 0.2 E.U./dL    Narrative:      In absence of clinical symptoms, the presence of pyuria, bacteria, and/or nitrites on the urinalysis result does not correlate with infection.  Urine microscopic not indicated.             Imaging:    CT Angiogram Chest Pulmonary Embolism  Result Date: 6/10/2025  CT ANGIOGRAM CHEST PULMONARY EMBOLISM Date of Exam: 6/10/2025 12:46 AM EDT Indication: Chest pain. Comparison: 11/18/2024 Technique: Axial CT images were obtained of the chest after the uneventful intravenous administration of iodinated contrast utilizing pulmonary embolism protocol.  Reconstructed coronal and sagittal images were also obtained. In addition, a 3-D volume rendered image was created for interpretation.  Automated exposure control and iterative construction methods were used. Findings: There is atherosclerotic disease and coronary artery disease. No aortic dissection is identified. No pulmonary embolism is identified. 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 No adenopathy is identified.  There is no pericardial effusion. No left-sided pleural effusion is identified. There is a small right pleural effusion that is probably minimally improved. No acute findings in the visualized upper abdomen. Lung windows demonstrate some chronic right lower lobe opacity that may reflect chronic atelectasis or possibly some round atelectasis. There is some chronic scarring in the periphery of the right middle lobe that is unchanged. No evidence of pulmonary edema. No definite evidence of acute pneumonia. No pneumothorax. There are old bilateral rib fractures. No clearly acute fractures in the chest or thoracic spine. Chronic compression fractures at T3, T11, and T12 are unchanged.     1.No pulmonary embolism or aortic dissection identified. 2.Small right pleural  effusion, probably minimally improved. 3.Chronic right lower lobe opacity that may reflect chronic atelectasis or round atelectasis. 4.Chronic scarring in the right middle lobe. 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 6.Coronary artery disease. 7.Old bilateral rib fractures and chronic compression fractures in the thoracic spine. Electronically Signed: Camron Carrion MD  6/10/2025 1:14 AM EDT  Workstation ID: [id number]    XR Chest 1 View  Result Date: 6/9/2025  XR CHEST 1 VW Date of Exam: 6/9/2025 9:42 PM EDT Indication: Chest Pain Triage Protocol Comparison: 11/24/2024 Findings: Cardiac and mediastinal contours are normal. There is atherosclerotic disease in the aorta. Bilateral calcified pleural plaques are present. Right pleural effusion has worsened with atelectasis or infiltrate at the right base. There is some mild left basilar atelectasis. No pneumothorax. Carotid atherosclerotic disease is noted.     1.Worsening right pleural effusion with atelectasis or infiltrate at the right base. 2.Calcified pleural plaques. Electronically Signed: Camron Carrion MD  6/9/2025 10:06 PM EDT  Workstation ID: [id number]    CT Head Without Contrast  Result Date: 6/9/2025  CT HEAD WO CONTRAST Date of Exam: 6/9/2025 1:44 PM EDT Indication: fall. Trauma. Comparison: 11/24/2024 Technique: Axial CT images were obtained of the head without contrast administration.  Reconstructed coronal and sagittal images were also obtained. Automated exposure control and iterative construction methods were used. Findings: There is a background of diffuse parenchymal volume loss. There is moderate to severe periventricular white matter hypodensity most commonly secondary to chronic small vessel ischemic change. There is no evidence of mass effect, midline shift, acute hemorrhage or edema. No abnormal fluid collections are identified. Ventricles are appropriate in size and configuration for the patient's age  and degree of volume loss. Normal gray-white differentiation. Midline structures are grossly unremarkable. Ossified mass in the left frontal sinus appears unchanged, again may be due to an osteoma. Probable mucous retention cyst or polyp in the left maxillary sinus. Paranasal sinuses and mastoid air cells are otherwise clear. No acute calvarial defects are seen. Orbits are unremarkable for age.     Impression: 1.No acute intracranial abnormality identified by noncontrast CT. 2.Diffuse parenchymal volume loss and probable chronic small vessel ischemic change. Electronically Signed: Bari Steward MD  6/9/2025 2:07 PM EDT  Workstation ID: [id number]    XR Hand 3+ View Right  Result Date: 6/9/2025  XR HAND 3+ VW RIGHT, XR ELBOW 3+ VW LEFT Date of Exam: 6/9/2025 1:37 PM EDT Indication: fall Comparison: None available. Findings: Evaluation of the right hand demonstrates some questionable osseous irregularity along the head of the fourth middle phalanx. Minimally displaced fracture cannot be excluded. Some joint space narrowing is noted involving the fourth and fifth DIP joints with mild flexion deformity. No definite soft tissue abnormality. Evaluation of the left elbow shows no evidence for acute fracture or acute alignment abnormality. No underlying joint effusion or soft tissue abnormality.     Impression: Questionable fracture at the head of the fourth middle phalanx. No evidence for acute osseous injury to the left elbow. Electronically Signed: Keshia Awan MD  6/9/2025 1:42 PM EDT  Workstation ID: [id number]    XR Elbow 3+ View Left  Result Date: 6/9/2025  XR HAND 3+ VW RIGHT, XR ELBOW 3+ VW LEFT Date of Exam: 6/9/2025 1:37 PM EDT Indication: fall Comparison: None available. Findings: Evaluation of the right hand demonstrates some questionable osseous irregularity along the head of the fourth middle phalanx. Minimally displaced fracture cannot be excluded. Some joint space narrowing is noted involving the fourth and  fifth DIP joints with mild flexion deformity. No definite soft tissue abnormality. Evaluation of the left elbow shows no evidence for acute fracture or acute alignment abnormality. No underlying joint effusion or soft tissue abnormality.     Impression: Questionable fracture at the head of the fourth middle phalanx. No evidence for acute osseous injury to the left elbow. Electronically Signed: Keshia Awan MD  6/9/2025 1:42 PM EDT  Workstation ID: [id number]        Differential Diagnosis and Discussion:    Chest Pain:  Based on the patient's signs and symptoms, I considered aortic dissection, myocardial infaction, pulmonary embolism, cardiac tamponade, pericarditis, pneumothorax, musculoskeletal chest pain and other differential diagnosis as an etiology of the patient's chest pain.   Headache: Differential diagnosis includes but is not limited to migraine, cluster headache, hypertension, tumor, subarachnoid bleeding, pseudotumor cerebri, temporal arteritis, infections, tension headache, and TMJ syndrome.    PROCEDURES:    Labs were collected in the emergency department and all labs were reviewed and interpreted by me.  X-ray were performed in the emergency department and all X-ray impressions were independently interpreted by me.  An EKG was performed and the EKG was interpreted by me.  CT scan was performed in the emergency department and the CT scan radiology impression was interpreted by me.    ECG 12 Lead ED Triage Standing Order; Chest Pain   Preliminary Result   HEART RATE=70  bpm   RR Siexbhho=700  ms   ID Jphsvccj=128  ms   P Horizontal Axis=18  deg   P Front Axis=64  deg   QRSD Interval=87  ms   QT Akwpglpt=473  ms   TLpZ=863  ms   QRS Axis=14  deg   T Wave Axis=17  deg   - BORDERLINE ECG -   Sinus rhythm   Ventricular premature complex   Abnormal R-wave progression, early transition   Minimal ST depression, lateral leads   Borderline prolonged QT interval   Date and Time of Study:2025-06-09 21:43:40           Procedures    MDM  Number of Diagnoses or Management Options  Diagnosis management comments: Patient is afebrile nontoxic-appearing.  Vital signs are stable.  Patient was sent back for reevaluation.  Patient does have wound to the left side of his face and left elbow.  Dressings were changed.  Labs showed no significant abnormality.  X-ray did show right pleural effusion.  CT was obtained that showed small right pleural effusion minimally improved.  O2 sat upper 90s on room air.  At this time patient is stable for discharge back to nursing facility.  Recommend close follow-up with her primary care provider.  Discussed return precautions, discharge instructions and answered all his questions       Amount and/or Complexity of Data Reviewed  Clinical lab tests: reviewed  Tests in the radiology section of CPT®: reviewed  Review and summarize past medical records: yes  Independent visualization of images, tracings, or specimens: yes    Risk of Complications, Morbidity, and/or Mortality  Presenting problems: moderate  Management options: moderate                       Patient Care Considerations:    SEPSIS was considered but is NOT present in the emergency department as SIRS criteria is not present.      Consultants/Shared Management Plan:    None    Social Determinants of Health:    Patient is a nursing home/assisted living resident and has reliable access to care.      Disposition and Care Coordination:    Discharged: The patient is suitable and stable for discharge with no need for consideration of admission.    I have explained the patient´s condition, diagnoses and treatment plan based on the information available to me at this time. I have answered questions and addressed any concerns. The patient has a good  understanding of the patient´s diagnosis, condition, and treatment plan as can be expected at this point. The vital signs have been stable. The patient´s condition is stable and appropriate for discharge  from the emergency department.      The patient will pursue further outpatient evaluation with the primary care physician or other designated or consulting physician as outlined in the discharge instructions. They are agreeable to this plan of care and follow-up instructions have been explained in detail. The patient has received these instructions in written format and has expressed an understanding of the discharge instructions. The patient is aware that any significant change in condition or worsening of symptoms should prompt an immediate return to this or the closest emergency department or call to 911.  I have explained discharge medications and the need for follow up with the patient/caretakers. This was also printed in the discharge instructions. Patient was discharged with the following medications and follow up:      Medication List      No changes were made to your prescriptions during this visit.      Celina Salgado, APRN  [address]  [address]  [facility] [id number] [phone]    In 2 days      [facility]  [address]  [facility] [address]  [facility] [id number] [phone]           Final diagnoses:   Pleural effusion   Nonintractable headache, unspecified chronicity pattern, unspecified headache type   Weakness   Open wound of face, initial encounter   Open wound of left elbow, initial encounter        ED Disposition       ED Disposition   Discharge    Condition   Stable    Comment   --               This medical record created using voice recognition software.             Jessica Reynolds MD  06/10/25 [id number]

## 2025-06-10 NOTE — ED NOTES
Pt report called to [facility]. All questions answered. Patient ready for transport back to facility.

## 2025-06-13 ENCOUNTER — TELEPHONE (OUTPATIENT)
Dept: INTERNAL MEDICINE | Age: 85
End: 2025-06-13
Payer: COMMERCIAL

## 2025-06-13 NOTE — TELEPHONE ENCOUNTER
Caller: Fanny  Relationship: REGINO  Best call back number: [phone]  Who are you requesting to speak with (clinical staff, provider,  specific staff member): MA  What was the call regarding: REQUESTING A VERBAL RELEASE FOR NURSING. 2 TIMES A WEEK FOR 3 WEEKS AND ONCE A WEEK FOR 5 WEEKS FOR WOUNDS TO EYES. IT WILL BE CLEAN WITH SALINE AND LEAVE IT OPEN FOR AIR. WOUND ON LEFT ELBOW WILL BE CLEAN WITH SALINE. WOUND CARE WILL BE TWICE A WEEK.

## 2025-06-19 LAB
QT INTERVAL: 445 MS
QTC INTERVAL: 479 MS

## 2025-06-20 ENCOUNTER — OFFICE VISIT (OUTPATIENT)
Dept: CARDIOLOGY | Facility: CLINIC | Age: 85
End: 2025-06-20
Payer: MEDICARE

## 2025-06-20 VITALS
SYSTOLIC BLOOD PRESSURE: 150 MMHG | WEIGHT: 203.8 LBS | BODY MASS INDEX: 28.53 KG/M2 | DIASTOLIC BLOOD PRESSURE: 85 MMHG | HEART RATE: 66 BPM | HEIGHT: 71 IN

## 2025-06-20 DIAGNOSIS — I50.22 CHRONIC SYSTOLIC HEART FAILURE: Primary | ICD-10-CM

## 2025-06-20 DIAGNOSIS — R55 SYNCOPE AND COLLAPSE: ICD-10-CM

## 2025-06-20 PROCEDURE — 99214 OFFICE O/P EST MOD 30 MIN: CPT | Performed by: INTERNAL MEDICINE

## 2025-06-20 PROCEDURE — 1160F RVW MEDS BY RX/DR IN RCRD: CPT | Performed by: INTERNAL MEDICINE

## 2025-06-20 PROCEDURE — 1159F MED LIST DOCD IN RCRD: CPT | Performed by: INTERNAL MEDICINE

## 2025-06-20 NOTE — PROGRESS NOTES
Chief Complaint  Follow-up (6 mo), Congestive Heart Failure, and Loss of Consciousness    Subjective            Arnaldo Benoit presents to [facility] C[facility]    Mr. Benoit is here for follow-up.  He was previously evaluated in the clinic for orthostatic syncope, ventricular bigeminy, chronic systolic heart failure and mitral regurgitation.  Currently he has been doing well.  He denies chest pain, palpitations or excessive shortness of breath.  He fell a couple of weeks ago after walking for a while he became lightheaded and fell.  He has had orthostatic hypotension previously.    PMH  Past Medical History:   Diagnosis Date    Dementia     early    Hyperlipidemia          SURGICALHX  History reviewed. No pertinent surgical history.     SOC  Social History     Socioeconomic History    Marital status:    Tobacco Use    Smoking status: Former     Current packs/day: 0.00     Average packs/day: 0.5 packs/day for 3.0 years (1.5 ttl pk-yrs)     Types: Cigarettes     Start date:      Quit date:      Years since quittin.4     Passive exposure: Past    Smokeless tobacco: Never   Vaping Use    Vaping status: Never Used   Substance and Sexual Activity    Alcohol use: Yes     Comment: patient and family states occasional rum drinker. place of living and EMS states patient drinks daily.    Drug use: Never    Sexual activity: Defer         FAMHX  History reviewed. No pertinent family history.       ALLERGY  Allergies   Allergen Reactions    Beef-Derived Drug Products Unknown - Low Severity        MEDSCURRENT    Current Outpatient Medications:     atorvastatin (LIPITOR) 20 MG tablet, Take 1 tablet by mouth Daily., Disp: 90 tablet, Rfl: 3    carvedilol (COREG) 3.125 MG tablet, Take 1 tablet by mouth 2 (Two) Times a Day With Meals., Disp: 180 tablet, Rfl: 3    donepezil (Aricept) 10 MG tablet, Take 1 tablet by mouth Every Night., Disp: 90 tablet, Rfl: 3    lisinopril (PRINIVIL,ZESTRIL) 5 MG  "tablet, Take 1 tablet by mouth Daily., Disp: 90 tablet, Rfl: 3      Review of Systems   Cardiovascular:  Negative for chest pain, dyspnea on exertion, leg swelling and syncope.        Objective     /85 (BP Location: Right arm, Patient Position: Sitting, Cuff Size: Large Adult)   Pulse 66   Ht 180.3 cm (71\")   Wt 92.4 kg (203 lb 12.8 oz)   BMI 28.42 kg/m²       General Appearance:   well developed  well nourished  HENT:   oropharynx moist  lips not cyanotic  Neck:  thyroid not enlarged  supple  Respiratory:  no respiratory distress  normal breath sounds  no rales  Cardiovascular:  no jugular venous distention  regular rhythm  apical impulse normal  S1 normal, S2 normal  no S3, no S4   Systolic murmur  no rub, no thrill  carotid pulses normal; no bruit  pedal pulses normal  lower extremity edema: none    Musculoskeletal:  no clubbing of fingers.   normocephalic, head atraumatic  Skin:   warm, dry  Psychiatric:  judgement and insight appropriate  normal mood and affect      Result Review :     The following data was reviewed by: Dane Larios MD on 12/10/2024:    CMP          11/22/2024    05:30 11/24/2024    07:48 6/9/2025    21:49   CMP   Glucose 102  93  93    BUN 12  14  10.9    Creatinine 0.81  0.80  0.92    EGFR 86.4  86.7  81.0    Sodium 132  131  134    Potassium 4.5  4.4  4.4    Chloride 98  98  99    Calcium 8.8  8.9  9.3    Total Protein   7.3    Albumin   4.3    Globulin   3.0    Total Bilirubin   0.5    Alkaline Phosphatase   84    AST (SGOT)   18    ALT (SGPT)   9    Albumin/Globulin Ratio   1.4    BUN/Creatinine Ratio 14.8  17.5  11.8    Anion Gap 9.2  13.4  13.5      CBC          11/20/2024    05:18 11/24/2024    07:48 6/9/2025    21:49   CBC   WBC 8.53  8.85  10.49    RBC 3.90  4.29  4.49    Hemoglobin 11.1  12.0  12.8    Hematocrit 34.1  36.8  39.6    MCV 87.4  85.8  88.2    MCH 28.5  28.0  28.5    MCHC 32.6  32.6  32.3    RDW 16.3  16.2  16.9    Platelets 273  330  302  "       TSH          11/19/2024    05:11   TSH   TSH 3.140        Data reviewed : Hospital records reviewed    Procedures             Assessment and Plan        ASSESSMENT:  Encounter Diagnoses   Name Primary?    Chronic systolic heart failure Yes    Syncope and collapse          PLAN:    1.  Chronic systolic heart failure, clinically compensated.  We are using low doses of just carvedilol and lisinopril due to his orthostatic tendencies.  At this time his medication is optimized.  2.  Moderate to severe mitral regurgitation, seemingly asymptomatic.  Continue to monitor clinically.  Repeat echo will be scheduled for September  3.  Ventricular bigeminy, stable.  Asymptomatic.  Continue low-dose beta-blocker.    Routine follow-up in 6 months unless problems arise.      Patient was given instructions and counseling regarding his condition or for health maintenance advice. Please see specific information pulled into the AVS if appropriate.           Dane Larios MD   6/20/2025  12:55 EDT

## 2025-07-08 ENCOUNTER — OFFICE VISIT (OUTPATIENT)
Dept: NEUROLOGY | Facility: CLINIC | Age: 85
End: 2025-07-08
Payer: MEDICARE

## 2025-07-08 ENCOUNTER — PATIENT ROUNDING (BHMG ONLY) (OUTPATIENT)
Dept: NEUROLOGY | Facility: CLINIC | Age: 85
End: 2025-07-08
Payer: COMMERCIAL

## 2025-07-08 ENCOUNTER — LAB (OUTPATIENT)
Dept: LAB | Facility: HOSPITAL | Age: 85
End: 2025-07-08
Payer: MEDICARE

## 2025-07-08 VITALS
HEART RATE: 60 BPM | DIASTOLIC BLOOD PRESSURE: 62 MMHG | BODY MASS INDEX: 28.28 KG/M2 | SYSTOLIC BLOOD PRESSURE: 131 MMHG | WEIGHT: 202 LBS | HEIGHT: 71 IN

## 2025-07-08 DIAGNOSIS — F03.90 DEMENTIA WITHOUT BEHAVIORAL DISTURBANCE: ICD-10-CM

## 2025-07-08 DIAGNOSIS — F03.90 DEMENTIA WITHOUT BEHAVIORAL DISTURBANCE: Primary | ICD-10-CM

## 2025-07-08 LAB
FOLATE SERPL-MCNC: 10.2 NG/ML (ref 4.78–24.2)
VIT B12 BLD-MCNC: 273 PG/ML (ref 211–946)

## 2025-07-08 PROCEDURE — 1160F RVW MEDS BY RX/DR IN RCRD: CPT | Performed by: PSYCHIATRY & NEUROLOGY

## 2025-07-08 PROCEDURE — 83520 IMMUNOASSAY QUANT NOS NONAB: CPT

## 2025-07-08 PROCEDURE — 82746 ASSAY OF FOLIC ACID SERUM: CPT

## 2025-07-08 PROCEDURE — 1159F MED LIST DOCD IN RCRD: CPT | Performed by: PSYCHIATRY & NEUROLOGY

## 2025-07-08 PROCEDURE — 82607 VITAMIN B-12: CPT

## 2025-07-08 PROCEDURE — 36415 COLL VENOUS BLD VENIPUNCTURE: CPT

## 2025-07-08 PROCEDURE — 82525 ASSAY OF COPPER: CPT

## 2025-07-08 PROCEDURE — 99214 OFFICE O/P EST MOD 30 MIN: CPT | Performed by: PSYCHIATRY & NEUROLOGY

## 2025-07-08 NOTE — PROGRESS NOTES
CV Surgery Progress Note    Reason for Visit: New onset heart failure, worsening cardiogenic shock.    Subjective: Examined. Pt resting comfortably in bed. No acute events reported o/n. Vitals stable. Remains on Dobutamine 4 mcg/kg/min. On Midodrine.    History Of Present Illness  Patient is a 68 year old male transferred from [facility] with new onset heart failure, cardiogenic shock, and respiratory failure. Echo showed EF 15 -  20%. Patient was also found to have multivessel disease. LIBBY x3 placed w Impella support to LAD, LCx, and OM at [facility]. Patient had worsening cardiogenic shock and was put     Past Medical History   has a past medical history of Essential (primary) hypertension.  Surgical History   has no past surgical history on file.     Social History   reports that he has never smoked. He has never used smokeless tobacco. He reports current alcohol use. He reports that he does not use drugs.  Family History  family history is not on file.    Review of Systems: Unable to obtain.     Physical Exam  Physical Exam  Constitutional:       General: He is sleeping.   Eyes:      Extraocular Movements: Extraocular movements intact.   Cardiovascular:      Comments: Sinus tachycardia.  Pulmonary:      Comments: Trach  Abdominal:      General: Bowel sounds are normal.   Skin:     General: Skin is warm and dry.      Comments: Wounds CDI.   Neurological:      Comments: Left hemiparesis   Psychiatric:         Mood and Affect: Mood is anxious.         I have independently reviewed all vitals, labs, and imaging documented below.    Last Recorded Vitals  Temp:  [96.2 °F (35.7 °C)-97.7 °F (36.5 °C)] 97 °F (36.1 °C)  Heart Rate:  [] 112  Resp:  [19-41] 24  BP: ()/(48-72) 103/59  FiO2 (%):  [40 %] 40 %    Labs    Recent Labs   Lab 11/24/20  0300 11/23/20  0340 11/22/20  0400   SODIUM 136 137 136   CHLORIDE 101 101 99   CO2 29 29 30   BUN 56* 40* 55*   CREATININE 2.09* 1.66* 2.33*   CALCIUM 8.1*  Sent patient rounding through [employer] for response.     7.9* 7.8*   ALBUMIN 2.1* 2.2* 1.9*   BILIRUBIN 0.9 0.9 0.9   ALKPT 179* 199* 208*   GPT <6 <6 <6   AST 10 11 16   GLUCOSE 189* 182* 239*     Recent Labs   Lab 11/24/20  0300   WBC 16.6*   RBC 3.08*   HGB 8.7*   HCT 28.1*             Assessment    68 yoM with ischemic cardiomyopathy, biventricular failure, and renal failure. Patient is not a candidate for durable mechanical assist device due to comorbidities.      Plan    - HD today per Renal.  - Discharge plans for LTAC.  - Continue abx per ID.   - PT/OT.    Charting performed by Anabel Lugo for Dr. Charlton.    All medical record entries made by the scribe were at my direction. I have reviewed the chart and agree that the record accurately reflects my personal performance of the history, physical exam, hospital course, and assessment and plan.

## 2025-07-08 NOTE — PROGRESS NOTES
"Chief Complaint  Memory Loss (Brain MRI 11/24/24 @ [facility]/Short term memory concerns.)    Subjective          Arnaldo Benoit is a 85 y.o. male who presents to [facility]  History of Present Illness      History of Present Illness  The patient, an 85-year-old male, presents for evaluation of memory loss.    Approximately two weeks ago, he experienced a fall while at the hospital, which led to the scheduling of this appointment. He resides at the [facility]note 3], an assisted living facility that also offers memory care, although he has not yet utilized the memory care services. He requires assistance with medication management due to difficulties in remembering when to take his medications. His daily routine includes waking up at 4:00 AM, making his bed, showering, and changing his clothes independently. He is able to perform personal hygiene tasks without assistance. He relies on his phone to contact his daughter for assistance when needed.    He experienced a fall approximately a month ago, resulting in broken ribs on both sides. Since then, he has been using a walker for mobility and has not had any further falls. He is scheduled for a heart scan and ultrasound next week, followed by a consultation with Dr. Winter.    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    FAMILY HISTORY  - Negative for Alzheimer's disease and dementia on his side of the family.  - Mother: Alzheimer's disease before she passed away.  - Father: Short-term memory loss/dementia.    MEDICATIONS  CURRENT MEDS:  Memory pill      Objective   Vital Signs:   /62   Pulse 60   Ht 180.3 cm (70.98\")   Wt 91.6 kg (202 lb)   BMI 28.19 kg/m²     Physical Exam   Alert, significant " hard of hearing, follows commands well.  His Mini-Mental Status score is 23 out of 30.  Clock drawing is intact in terms of the numbers but the hands of the clock he states that he does not know how to put 11:10 and he initially put 10 minutes before 11 and then looked at his watch and then placed the correct time of 11:10 but he still tells him that he was unsure.  He kept on going off tangent on several subjects.  Visual fields are full, EOMs are full directions gaze, facial strength is full.  There is no weakness of the upper or lower extremities.  Reflexes are absent.  Station gait is able to ambulate using a rollator walker.  He has no tremor.  Heart is regular rhythm normal rate.     Results      I reviewed MRI of the brain which shows mild to moderate ischemic small vessel disease.     Assessment and Plan  Diagnoses and all orders for this visit:    1. Dementia without behavioral disturbance (Primary)  -     ATN Profile; Future  -     Vitamin B12; Future  -     Folate; Future  -     Copper, Serum; Future         Assessment & Plan  1. Memory loss.  His memory loss is progressively worsening. A blood test will be conducted to screen for Alzheimer's disease, which measures beta-amyloid protein levels in the blood. If the test is positive, it indicates a high probability of Alzheimer's disease. The potential treatment with infusions, including the risks of microbleeding and micro swelling, was discussed. The necessity of MRIs before the 5th, 7th, and 14th infusions was also explained. His daughter, who holds power of , expressed disinterest in this treatment option due to the associated risks and burden of frequent visits. The importance of continuing care at the assisted living facility was emphasized.    2. Fall risk.  He has experienced multiple falls, resulting in broken ribs on both sides. He uses a walker to prevent further falls and has not fallen since acquiring it. He is scheduled for a heart  scan and ultrasound next week, followed by a consultation with Dr. Winter.      Total time spent with the patient and coordinating patient care was 35 minutes.    Follow Up  No follow-ups on file.  Patient was given instructions and counseling regarding his condition or for health maintenance advice. Please see specific information pulled into the AVS if appropriate.     Patient or patient representative verbalized consent for the use of Ambient Listening during the visit with  Kartik Markham MD for chart documentation. 7/8/2025  16:01 EDT

## 2025-07-11 LAB
A -- BETA-AMYLOID 42/40 RATIO: 0.13
AL BETA40 PLAS-MCNC: 206.31 PG/ML
AL BETA42 PLAS-MCNC: 27.21 PG/ML
ATN SUMMARY1: NORMAL
INFORMATION:: NORMAL
NFL CHAIN SERPL IA-MCNC: 4.39 PG/ML (ref 0–9.13)
P-TAU181 PLAS IA-MCNC: 0.97 PG/ML (ref 0–0.97)

## 2025-07-12 LAB — COPPER SERPL-MCNC: 119 UG/DL (ref 69–132)

## 2025-07-17 ENCOUNTER — RESULTS FOLLOW-UP (OUTPATIENT)
Dept: CARDIOLOGY | Facility: CLINIC | Age: 85
End: 2025-07-17

## 2025-07-17 ENCOUNTER — HOSPITAL ENCOUNTER (OUTPATIENT)
Facility: HOSPITAL | Age: 85
Discharge: HOME | End: 2025-07-17
Admitting: INTERNAL MEDICINE
Payer: MEDICARE

## 2025-07-17 DIAGNOSIS — I50.22 CHRONIC SYSTOLIC HEART FAILURE: ICD-10-CM

## 2025-07-17 LAB
AORTIC DIMENSIONLESS INDEX: 0.86 (DI)
AV MEAN PRESS GRAD SYS DOP V1V2: 3 MMHG
AV VMAX SYS DOP: 119 CM/SEC
BH CV ECHO MEAS - AO MAX PG: 6 MMHG
BH CV ECHO MEAS - AO ROOT DIAM: 3.6 CM
BH CV ECHO MEAS - AO V2 VTI: 30.6 CM
BH CV ECHO MEAS - AVA(I,D): 3 CM2
BH CV ECHO MEAS - EDV(CUBED): 140.6 ML
BH CV ECHO MEAS - EDV(MOD-SP2): 95.7 ML
BH CV ECHO MEAS - EDV(MOD-SP4): 111 ML
BH CV ECHO MEAS - EF(MOD-SP2): 58.9 %
BH CV ECHO MEAS - EF(MOD-SP4): 62.5 %
BH CV ECHO MEAS - ESV(CUBED): 54.4 ML
BH CV ECHO MEAS - ESV(MOD-SP2): 39.3 ML
BH CV ECHO MEAS - ESV(MOD-SP4): 41.6 ML
BH CV ECHO MEAS - FS: 27.1 %
BH CV ECHO MEAS - IVS/LVPW: 1.06 CM
BH CV ECHO MEAS - IVSD: 1.07 CM
BH CV ECHO MEAS - LA DIMENSION: 4.1 CM
BH CV ECHO MEAS - LAT PEAK E' VEL: 8.7 CM/SEC
BH CV ECHO MEAS - LV DIASTOLIC VOL/BSA (35-75): 53 CM2
BH CV ECHO MEAS - LV MASS(C)D: 204.6 GRAMS
BH CV ECHO MEAS - LV MAX PG: 4.5 MMHG
BH CV ECHO MEAS - LV MEAN PG: 3 MMHG
BH CV ECHO MEAS - LV SYSTOLIC VOL/BSA (12-30): 19.8 CM2
BH CV ECHO MEAS - LV V1 MAX: 106 CM/SEC
BH CV ECHO MEAS - LV V1 VTI: 26.3 CM
BH CV ECHO MEAS - LVIDD: 5.2 CM
BH CV ECHO MEAS - LVIDS: 3.8 CM
BH CV ECHO MEAS - LVOT AREA: 3.5 CM2
BH CV ECHO MEAS - LVOT DIAM: 2.1 CM
BH CV ECHO MEAS - LVPWD: 1.01 CM
BH CV ECHO MEAS - MED PEAK E' VEL: 5.7 CM/SEC
BH CV ECHO MEAS - MV A MAX VEL: 102 CM/SEC
BH CV ECHO MEAS - MV DEC SLOPE: 261 CM/SEC2
BH CV ECHO MEAS - MV DEC TIME: 0.28 SEC
BH CV ECHO MEAS - MV E MAX VEL: 73.7 CM/SEC
BH CV ECHO MEAS - MV E/A: 0.72
BH CV ECHO MEAS - PA V2 MAX: 77.6 CM/SEC
BH CV ECHO MEAS - SV(LVOT): 91.1 ML
BH CV ECHO MEAS - SV(MOD-SP2): 56.4 ML
BH CV ECHO MEAS - SV(MOD-SP4): 69.4 ML
BH CV ECHO MEAS - SVI(LVOT): 43.5 ML/M2
BH CV ECHO MEAS - SVI(MOD-SP2): 26.9 ML/M2
BH CV ECHO MEAS - SVI(MOD-SP4): 33.1 ML/M2
BH CV ECHO MEAS - TAPSE (>1.6): 2.42 CM
BH CV ECHO MEAS - TR MAX PG: 27 MMHG
BH CV ECHO MEAS - TR MAX VEL: 260 CM/SEC
BH CV ECHO MEASUREMENTS AVERAGE E/E' RATIO: 10.24
BH CV XLRA - TDI S': 10.8 CM/SEC
LV EF BIPLANE MOD: 59.2 %

## 2025-07-17 PROCEDURE — 93306 TTE W/DOPPLER COMPLETE: CPT

## 2025-08-06 ENCOUNTER — HOSPITAL ENCOUNTER (EMERGENCY)
Facility: HOSPITAL | Age: 85
Discharge: HOME | End: 2025-08-06
Attending: EMERGENCY MEDICINE
Payer: MEDICARE

## 2025-08-06 ENCOUNTER — APPOINTMENT (OUTPATIENT)
Dept: GENERAL RADIOLOGY | Facility: HOSPITAL | Age: 85
End: 2025-08-06
Payer: MEDICARE

## 2025-08-06 VITALS
RESPIRATION RATE: 18 BRPM | WEIGHT: 204.81 LBS | OXYGEN SATURATION: 98 % | HEART RATE: 56 BPM | SYSTOLIC BLOOD PRESSURE: 119 MMHG | BODY MASS INDEX: 28.67 KG/M2 | TEMPERATURE: 98 F | HEIGHT: 71 IN | DIASTOLIC BLOOD PRESSURE: 99 MMHG

## 2025-08-06 DIAGNOSIS — M54.12 CERVICAL RADICULOPATHY: Primary | ICD-10-CM

## 2025-08-06 LAB
ALBUMIN SERPL-MCNC: 4.1 G/DL (ref 3.5–5.2)
ALBUMIN/GLOB SERPL: 1.4 G/DL
ALP SERPL-CCNC: 76 U/L (ref 39–117)
ALT SERPL W P-5'-P-CCNC: 10 U/L (ref 1–41)
ANION GAP SERPL CALCULATED.3IONS-SCNC: 11.1 MMOL/L (ref 5–15)
AST SERPL-CCNC: 18 U/L (ref 1–40)
BASOPHILS # BLD AUTO: 0.05 10*3/MM3 (ref 0–0.2)
BASOPHILS NFR BLD AUTO: 0.5 % (ref 0–1.5)
BILIRUB SERPL-MCNC: 0.3 MG/DL (ref 0–1.2)
BUN SERPL-MCNC: 17.3 MG/DL (ref 8–23)
BUN/CREAT SERPL: 14 (ref 7–25)
CALCIUM SPEC-SCNC: 9.5 MG/DL (ref 8.6–10.5)
CHLORIDE SERPL-SCNC: 97 MMOL/L (ref 98–107)
CO2 SERPL-SCNC: 22.9 MMOL/L (ref 22–29)
CREAT SERPL-MCNC: 1.24 MG/DL (ref 0.76–1.27)
DEPRECATED RDW RBC AUTO: 56.9 FL (ref 37–54)
EGFRCR SERPLBLD CKD-EPI 2021: 56.6 ML/MIN/1.73
EOSINOPHIL # BLD AUTO: 0.14 10*3/MM3 (ref 0–0.4)
EOSINOPHIL NFR BLD AUTO: 1.5 % (ref 0.3–6.2)
ERYTHROCYTE [DISTWIDTH] IN BLOOD BY AUTOMATED COUNT: 17.2 % (ref 12.3–15.4)
GEN 5 1HR TROPONIN T REFLEX: 21 NG/L
GLOBULIN UR ELPH-MCNC: 2.9 GM/DL
GLUCOSE SERPL-MCNC: 110 MG/DL (ref 65–99)
HCT VFR BLD AUTO: 37.6 % (ref 37.5–51)
HGB BLD-MCNC: 11.8 G/DL (ref 13–17.7)
HOLD SPECIMEN: NORMAL
HOLD SPECIMEN: NORMAL
IMM GRANULOCYTES # BLD AUTO: 0.04 10*3/MM3 (ref 0–0.05)
IMM GRANULOCYTES NFR BLD AUTO: 0.4 % (ref 0–0.5)
LIPASE SERPL-CCNC: 38 U/L (ref 13–60)
LYMPHOCYTES # BLD AUTO: 1.84 10*3/MM3 (ref 0.7–3.1)
LYMPHOCYTES NFR BLD AUTO: 19.6 % (ref 19.6–45.3)
MAGNESIUM SERPL-MCNC: 2.2 MG/DL (ref 1.6–2.4)
MCH RBC QN AUTO: 28.3 PG (ref 26.6–33)
MCHC RBC AUTO-ENTMCNC: 31.4 G/DL (ref 31.5–35.7)
MCV RBC AUTO: 90.2 FL (ref 79–97)
MONOCYTES # BLD AUTO: 1.24 10*3/MM3 (ref 0.1–0.9)
MONOCYTES NFR BLD AUTO: 13.2 % (ref 5–12)
NEUTROPHILS NFR BLD AUTO: 6.07 10*3/MM3 (ref 1.7–7)
NEUTROPHILS NFR BLD AUTO: 64.8 % (ref 42.7–76)
NRBC BLD AUTO-RTO: 0 /100 WBC (ref 0–0.2)
NT-PROBNP SERPL-MCNC: 727.6 PG/ML (ref 0–1800)
PLATELET # BLD AUTO: 270 10*3/MM3 (ref 140–450)
PMV BLD AUTO: 9.9 FL (ref 6–12)
POTASSIUM SERPL-SCNC: 4.7 MMOL/L (ref 3.5–5.2)
PROT SERPL-MCNC: 7 G/DL (ref 6–8.5)
RBC # BLD AUTO: 4.17 10*6/MM3 (ref 4.14–5.8)
SODIUM SERPL-SCNC: 131 MMOL/L (ref 136–145)
TROPONIN T % DELTA: -5
TROPONIN T NUMERIC DELTA: -1 NG/L
TROPONIN T SERPL HS-MCNC: 22 NG/L
WBC NRBC COR # BLD AUTO: 9.38 10*3/MM3 (ref 3.4–10.8)
WHOLE BLOOD HOLD COAG: NORMAL
WHOLE BLOOD HOLD SPECIMEN: NORMAL

## 2025-08-06 PROCEDURE — 84484 ASSAY OF TROPONIN QUANT: CPT

## 2025-08-06 PROCEDURE — 85025 COMPLETE CBC W/AUTO DIFF WBC: CPT | Performed by: EMERGENCY MEDICINE

## 2025-08-06 PROCEDURE — 80053 COMPREHEN METABOLIC PANEL: CPT

## 2025-08-06 PROCEDURE — 36415 COLL VENOUS BLD VENIPUNCTURE: CPT

## 2025-08-06 PROCEDURE — 84484 ASSAY OF TROPONIN QUANT: CPT | Performed by: EMERGENCY MEDICINE

## 2025-08-06 PROCEDURE — 99284 EMERGENCY DEPT VISIT MOD MDM: CPT

## 2025-08-06 PROCEDURE — 83735 ASSAY OF MAGNESIUM: CPT

## 2025-08-06 PROCEDURE — 93005 ELECTROCARDIOGRAM TRACING: CPT

## 2025-08-06 PROCEDURE — 83690 ASSAY OF LIPASE: CPT

## 2025-08-06 PROCEDURE — 93005 ELECTROCARDIOGRAM TRACING: CPT | Performed by: EMERGENCY MEDICINE

## 2025-08-06 PROCEDURE — 71045 X-RAY EXAM CHEST 1 VIEW: CPT

## 2025-08-06 PROCEDURE — 83880 ASSAY OF NATRIURETIC PEPTIDE: CPT

## 2025-08-06 RX ORDER — SODIUM CHLORIDE 0.9 % (FLUSH) 0.9 %
10 SYRINGE (ML) INJECTION AS NEEDED
Status: DISCONTINUED | OUTPATIENT
Start: 2025-08-06 | End: 2025-08-06 | Stop reason: HOSPADM

## 2025-08-06 RX ORDER — ASPIRIN 81 MG/1
324 TABLET, CHEWABLE ORAL ONCE
Status: DISCONTINUED | OUTPATIENT
Start: 2025-08-06 | End: 2025-08-06

## 2025-08-11 LAB
QT INTERVAL: 462 MS
QTC INTERVAL: 468 MS